# Patient Record
Sex: FEMALE | Race: BLACK OR AFRICAN AMERICAN | NOT HISPANIC OR LATINO
[De-identification: names, ages, dates, MRNs, and addresses within clinical notes are randomized per-mention and may not be internally consistent; named-entity substitution may affect disease eponyms.]

---

## 2017-10-18 ENCOUNTER — TRANSCRIPTION ENCOUNTER (OUTPATIENT)
Age: 27
End: 2017-10-18

## 2018-08-21 PROBLEM — Z00.00 ENCOUNTER FOR PREVENTIVE HEALTH EXAMINATION: Status: ACTIVE | Noted: 2018-08-21

## 2018-08-27 ENCOUNTER — APPOINTMENT (OUTPATIENT)
Dept: MATERNAL FETAL MEDICINE | Facility: CLINIC | Age: 28
End: 2018-08-27
Payer: MEDICAID

## 2018-08-27 ENCOUNTER — ASOB RESULT (OUTPATIENT)
Age: 28
End: 2018-08-27

## 2018-08-27 PROCEDURE — 99204 OFFICE O/P NEW MOD 45 MIN: CPT | Mod: 25

## 2018-09-14 ENCOUNTER — APPOINTMENT (OUTPATIENT)
Dept: MATERNAL FETAL MEDICINE | Facility: CLINIC | Age: 28
End: 2018-09-14

## 2018-10-11 ENCOUNTER — APPOINTMENT (OUTPATIENT)
Dept: MATERNAL FETAL MEDICINE | Facility: CLINIC | Age: 28
End: 2018-10-11
Payer: MEDICAID

## 2018-10-11 ENCOUNTER — ASOB RESULT (OUTPATIENT)
Age: 28
End: 2018-10-11

## 2018-10-11 PROCEDURE — 99213 OFFICE O/P EST LOW 20 MIN: CPT

## 2020-04-25 ENCOUNTER — MESSAGE (OUTPATIENT)
Age: 30
End: 2020-04-25

## 2020-05-18 LAB
SARS-COV-2 IGG SERPL IA-ACNC: 0.1 INDEX
SARS-COV-2 IGG SERPL QL IA: NEGATIVE

## 2021-03-30 ENCOUNTER — TRANSCRIPTION ENCOUNTER (OUTPATIENT)
Age: 31
End: 2021-03-30

## 2021-04-29 LAB
COVID-19 NUCLEOCAPSID  GAM ANTIBODY INTERPRETATION: POSITIVE
SARS-COV-2 AB SERPL QL IA: 28.1 INDEX

## 2021-07-02 ENCOUNTER — APPOINTMENT (OUTPATIENT)
Dept: OBGYN | Facility: CLINIC | Age: 31
End: 2021-07-02
Payer: COMMERCIAL

## 2021-07-02 VITALS — SYSTOLIC BLOOD PRESSURE: 117 MMHG | DIASTOLIC BLOOD PRESSURE: 72 MMHG | HEIGHT: 63 IN

## 2021-07-02 DIAGNOSIS — Z78.9 OTHER SPECIFIED HEALTH STATUS: ICD-10-CM

## 2021-07-02 DIAGNOSIS — N60.02 SOLITARY CYST OF RIGHT BREAST: ICD-10-CM

## 2021-07-02 DIAGNOSIS — N64.4 MASTODYNIA: ICD-10-CM

## 2021-07-02 DIAGNOSIS — Z80.3 FAMILY HISTORY OF MALIGNANT NEOPLASM OF BREAST: ICD-10-CM

## 2021-07-02 DIAGNOSIS — N60.01 SOLITARY CYST OF RIGHT BREAST: ICD-10-CM

## 2021-07-02 PROCEDURE — 99072 ADDL SUPL MATRL&STAF TM PHE: CPT

## 2021-07-02 PROCEDURE — 99385 PREV VISIT NEW AGE 18-39: CPT

## 2021-07-02 NOTE — PHYSICAL EXAM
[Appropriately responsive] : appropriately responsive [Alert] : alert [No Acute Distress] : no acute distress [No Lymphadenopathy] : no lymphadenopathy [Regular Rate Rhythm] : regular rate rhythm [No Murmurs] : no murmurs [Clear to Auscultation B/L] : clear to auscultation bilaterally [Soft] : soft [Non-tender] : non-tender [Oriented x3] : oriented x3 [FreeTextEntry3] : no thyromegaly [FreeTextEntry7] : no organomegaly [Examination Of The Breasts] : a normal appearance [] : multiple nodules were palpated [Labia Majora] : normal [Labia Minora] : normal [Normal] : normal [FreeTextEntry4] : nl vagina [FreeTextEntry5] : non tender PAP done [FreeTextEntry6] : no uterine or adnexal masses [FreeTextEntry8] : nl bimanual

## 2021-07-02 NOTE — DISCUSSION/SUMMARY
[FreeTextEntry1] : 31 yr old got  in June and pictures were beautiful. Patient has multiple cysts bilaterally and she has pain in breast. Scanned her past sonograms and has several cysts. Referral for sonogram . Gave her [facility] Rob[facility]ol[facility] referral for all her bilateral cysts .She said she had a large fibroadenoma removed.. She sometimes has discomfort with sex and told her sometimes when banging cervix can cause discomfort and especially when close to getting period. No pain on entry and lubricates, Complete exam done. They are using condoms and plan to try for baby in about a year. She states Braca test was neg

## 2021-07-02 NOTE — HISTORY OF PRESENT ILLNESS
[Men] : men [FreeTextEntry1] : 31 yr old new patient has breast pain [FreeTextEntry2] :    5 past partners

## 2021-07-07 ENCOUNTER — NON-APPOINTMENT (OUTPATIENT)
Age: 31
End: 2021-07-07

## 2021-07-07 ENCOUNTER — APPOINTMENT (OUTPATIENT)
Dept: CARDIOLOGY | Facility: CLINIC | Age: 31
End: 2021-07-07
Payer: COMMERCIAL

## 2021-07-07 VITALS
HEIGHT: 63 IN | BODY MASS INDEX: 26.4 KG/M2 | DIASTOLIC BLOOD PRESSURE: 70 MMHG | HEART RATE: 54 BPM | SYSTOLIC BLOOD PRESSURE: 102 MMHG | OXYGEN SATURATION: 100 % | WEIGHT: 149 LBS

## 2021-07-07 DIAGNOSIS — T78.40XD ALLERGY, UNSPECIFIED, SUBSEQUENT ENCOUNTER: ICD-10-CM

## 2021-07-07 DIAGNOSIS — M54.5 LOW BACK PAIN: ICD-10-CM

## 2021-07-07 PROCEDURE — 93000 ELECTROCARDIOGRAM COMPLETE: CPT

## 2021-07-07 PROCEDURE — 99204 OFFICE O/P NEW MOD 45 MIN: CPT

## 2021-07-07 PROCEDURE — 99072 ADDL SUPL MATRL&STAF TM PHE: CPT

## 2021-07-07 NOTE — REVIEW OF SYSTEMS
[Chest Discomfort] : chest discomfort [Palpitations] : palpitations [Negative] : Heme/Lymph [SOB] : no shortness of breath

## 2021-07-07 NOTE — REASON FOR VISIT
[FreeTextEntry1] : This is the first office visit for this 31-year-old female who experienced chest pains a few weeks ago.  She was under stress at the time.  She was planning her own wedding and found out that a cousin who is of similar age had  of a myocardial infarction.  She went to the [facility] emergency room where they did serial enzymes and EKGs and sent her home after approximately 6 hours.  She now comes for follow-up.\par \par The patient had COVID-19 in 2021.  At the time she lost her sense of taste and smell.  Of note recently she has been having a sensation of smelling smoke when there is no smoke.\par \par The patient has had known fibrocystic disease of the breast and had breast biopsy.\par \par The patient never smoked.  She only has an occasional alcohol averaging 2 or 3 a month.  She has no recreational drugs.  She only has occasional caffeine.\par \par Both parents are alive and well.\par \par She takes an occasional ibuprofen for pain and loratadine for skin allergies.\par \par She has no known drug allergies.\par \par She denies any shortness of breath but does get occasional palpitations.

## 2021-07-07 NOTE — DISCUSSION/SUMMARY
[FreeTextEntry1] : The patient was examined.  Her blood pressure was 102/70, pulse was 54.  Her lungs were clear to auscultation.  Cardiac exam was negative for murmurs rubs or gallops.  Her EKG showed sinus bradycardia with normal QRS complexes.  Because of the atypical chest pain we will send the patient for a treadmill stress test.  Because of the palpitations and atypical chest pains we will send her for an echocardiogram to rule out mitral valve prolapse and for LV size and function.  No new medications were prescribed at this visit.  If the patient still gets abnormal smells in the future she will follow-up with a neurologist.  She will return here in approximately 1 month after these tests are done.  Total time spent on the day of the encounter was 49  minutes which includes  face-to-face and non face-to-face times personally spent by the physician preparing to see the patient, obtaining  separately obtained history, performing a medically appropriate exam and evaluation, counseling, educating, talking to the family or caregivers, ordering medicines, ordering tests or procedures, referring and communicating with other healthcare foods professionals, and documenting clinical information in the electronic health record.

## 2021-07-08 ENCOUNTER — APPOINTMENT (OUTPATIENT)
Dept: CARDIOLOGY | Facility: CLINIC | Age: 31
End: 2021-07-08
Payer: COMMERCIAL

## 2021-07-08 PROCEDURE — 93015 CV STRESS TEST SUPVJ I&R: CPT

## 2021-07-08 PROCEDURE — 99072 ADDL SUPL MATRL&STAF TM PHE: CPT

## 2021-07-12 LAB — CYTOLOGY CVX/VAG DOC THIN PREP: ABNORMAL

## 2021-07-27 ENCOUNTER — APPOINTMENT (OUTPATIENT)
Dept: CARDIOLOGY | Facility: CLINIC | Age: 31
End: 2021-07-27
Payer: COMMERCIAL

## 2021-07-27 PROCEDURE — 99072 ADDL SUPL MATRL&STAF TM PHE: CPT

## 2021-07-27 PROCEDURE — 93306 TTE W/DOPPLER COMPLETE: CPT

## 2021-08-02 ENCOUNTER — APPOINTMENT (OUTPATIENT)
Dept: CARDIOLOGY | Facility: CLINIC | Age: 31
End: 2021-08-02

## 2021-08-16 ENCOUNTER — RESULT REVIEW (OUTPATIENT)
Age: 31
End: 2021-08-16

## 2022-09-14 ENCOUNTER — EMERGENCY (EMERGENCY)
Facility: HOSPITAL | Age: 32
LOS: 1 days | Discharge: STILL ADMITTED OR ADMINISTRATIVE | End: 2022-09-14
Admitting: EMERGENCY MEDICINE

## 2022-09-14 ENCOUNTER — INPATIENT (INPATIENT)
Facility: HOSPITAL | Age: 32
LOS: 0 days | Discharge: HOME | End: 2022-09-15
Attending: SPECIALIST | Admitting: SPECIALIST

## 2022-09-14 VITALS
SYSTOLIC BLOOD PRESSURE: 135 MMHG | TEMPERATURE: 98 F | HEART RATE: 61 BPM | RESPIRATION RATE: 17 BRPM | HEIGHT: 63 IN | OXYGEN SATURATION: 100 % | DIASTOLIC BLOOD PRESSURE: 65 MMHG

## 2022-09-14 VITALS
SYSTOLIC BLOOD PRESSURE: 112 MMHG | HEART RATE: 60 BPM | DIASTOLIC BLOOD PRESSURE: 60 MMHG | TEMPERATURE: 99 F | RESPIRATION RATE: 16 BRPM

## 2022-09-14 DIAGNOSIS — Z3A.00 WEEKS OF GESTATION OF PREGNANCY NOT SPECIFIED: ICD-10-CM

## 2022-09-14 DIAGNOSIS — O26.899 OTHER SPECIFIED PREGNANCY RELATED CONDITIONS, UNSPECIFIED TRIMESTER: ICD-10-CM

## 2022-09-14 DIAGNOSIS — O44.40 LOW LYING PLACENTA NOS OR WITHOUT HEMORRHAGE, UNSPECIFIED TRIMESTER: ICD-10-CM

## 2022-09-14 DIAGNOSIS — N84.1 POLYP OF CERVIX UTERI: ICD-10-CM

## 2022-09-14 LAB
APPEARANCE UR: CLEAR — SIGNIFICANT CHANGE UP
APTT BLD: 26.1 SEC — LOW (ref 27–36.3)
BASOPHILS # BLD AUTO: 0.02 K/UL — SIGNIFICANT CHANGE UP (ref 0–0.2)
BASOPHILS NFR BLD AUTO: 0.3 % — SIGNIFICANT CHANGE UP (ref 0–2)
BILIRUB UR-MCNC: NEGATIVE — SIGNIFICANT CHANGE UP
BLD GP AB SCN SERPL QL: NEGATIVE — SIGNIFICANT CHANGE UP
COLOR SPEC: SIGNIFICANT CHANGE UP
DIFF PNL FLD: NEGATIVE — SIGNIFICANT CHANGE UP
EOSINOPHIL # BLD AUTO: 0.04 K/UL — SIGNIFICANT CHANGE UP (ref 0–0.5)
EOSINOPHIL NFR BLD AUTO: 0.6 % — SIGNIFICANT CHANGE UP (ref 0–6)
FIBRINOGEN PPP-MCNC: 853 MG/DL — HIGH (ref 330–520)
GLUCOSE UR QL: NEGATIVE — SIGNIFICANT CHANGE UP
HCT VFR BLD CALC: 35.8 % — SIGNIFICANT CHANGE UP (ref 34.5–45)
HGB BLD-MCNC: 11.9 G/DL — SIGNIFICANT CHANGE UP (ref 11.5–15.5)
HIV 1+2 AB+HIV1 P24 AG SERPL QL IA: SIGNIFICANT CHANGE UP
IANC: 4.73 K/UL — SIGNIFICANT CHANGE UP (ref 1.8–7.4)
IMM GRANULOCYTES NFR BLD AUTO: 0.7 % — SIGNIFICANT CHANGE UP (ref 0–1.5)
INR BLD: 1.02 RATIO — SIGNIFICANT CHANGE UP (ref 0.88–1.16)
KETONES UR-MCNC: NEGATIVE — SIGNIFICANT CHANGE UP
LEUKOCYTE ESTERASE UR-ACNC: NEGATIVE — SIGNIFICANT CHANGE UP
LYMPHOCYTES # BLD AUTO: 1.8 K/UL — SIGNIFICANT CHANGE UP (ref 1–3.3)
LYMPHOCYTES # BLD AUTO: 25.3 % — SIGNIFICANT CHANGE UP (ref 13–44)
MCHC RBC-ENTMCNC: 28.5 PG — SIGNIFICANT CHANGE UP (ref 27–34)
MCHC RBC-ENTMCNC: 33.2 GM/DL — SIGNIFICANT CHANGE UP (ref 32–36)
MCV RBC AUTO: 85.6 FL — SIGNIFICANT CHANGE UP (ref 80–100)
MONOCYTES # BLD AUTO: 0.47 K/UL — SIGNIFICANT CHANGE UP (ref 0–0.9)
MONOCYTES NFR BLD AUTO: 6.6 % — SIGNIFICANT CHANGE UP (ref 2–14)
NEUTROPHILS # BLD AUTO: 4.73 K/UL — SIGNIFICANT CHANGE UP (ref 1.8–7.4)
NEUTROPHILS NFR BLD AUTO: 66.5 % — SIGNIFICANT CHANGE UP (ref 43–77)
NITRITE UR-MCNC: NEGATIVE — SIGNIFICANT CHANGE UP
NRBC # BLD: 0 /100 WBCS — SIGNIFICANT CHANGE UP (ref 0–0)
NRBC # FLD: 0 K/UL — SIGNIFICANT CHANGE UP (ref 0–0)
PH UR: 6.5 — SIGNIFICANT CHANGE UP (ref 5–8)
PLATELET # BLD AUTO: 261 K/UL — SIGNIFICANT CHANGE UP (ref 150–400)
PROT UR-MCNC: NEGATIVE — SIGNIFICANT CHANGE UP
PROTHROM AB SERPL-ACNC: 11.8 SEC — SIGNIFICANT CHANGE UP (ref 10.5–13.4)
RBC # BLD: 4.18 M/UL — SIGNIFICANT CHANGE UP (ref 3.8–5.2)
RBC # FLD: 14.3 % — SIGNIFICANT CHANGE UP (ref 10.3–14.5)
RH IG SCN BLD-IMP: POSITIVE — SIGNIFICANT CHANGE UP
RH IG SCN BLD-IMP: POSITIVE — SIGNIFICANT CHANGE UP
SARS-COV-2 RNA SPEC QL NAA+PROBE: SIGNIFICANT CHANGE UP
SP GR SPEC: 1.01 — LOW (ref 1.01–1.05)
UROBILINOGEN FLD QL: SIGNIFICANT CHANGE UP
WBC # BLD: 7.11 K/UL — SIGNIFICANT CHANGE UP (ref 3.8–10.5)
WBC # FLD AUTO: 7.11 K/UL — SIGNIFICANT CHANGE UP (ref 3.8–10.5)

## 2022-09-14 PROCEDURE — 99221 1ST HOSP IP/OBS SF/LOW 40: CPT

## 2022-09-14 PROCEDURE — L9996: CPT

## 2022-09-14 RX ORDER — SODIUM CHLORIDE 9 MG/ML
1000 INJECTION, SOLUTION INTRAVENOUS ONCE
Refills: 0 | Status: COMPLETED | OUTPATIENT
Start: 2022-09-14 | End: 2022-09-14

## 2022-09-14 RX ORDER — ONDANSETRON 8 MG/1
4 TABLET, FILM COATED ORAL ONCE
Refills: 0 | Status: COMPLETED | OUTPATIENT
Start: 2022-09-14 | End: 2022-09-14

## 2022-09-14 RX ORDER — SODIUM CHLORIDE 9 MG/ML
3 INJECTION INTRAMUSCULAR; INTRAVENOUS; SUBCUTANEOUS EVERY 8 HOURS
Refills: 0 | Status: DISCONTINUED | OUTPATIENT
Start: 2022-09-14 | End: 2022-09-15

## 2022-09-14 RX ORDER — INFLUENZA VIRUS VACCINE 15; 15; 15; 15 UG/.5ML; UG/.5ML; UG/.5ML; UG/.5ML
0.5 SUSPENSION INTRAMUSCULAR ONCE
Refills: 0 | Status: DISCONTINUED | OUTPATIENT
Start: 2022-09-14 | End: 2022-09-15

## 2022-09-14 RX ADMIN — SODIUM CHLORIDE 2000 MILLILITER(S): 9 INJECTION, SOLUTION INTRAVENOUS at 16:10

## 2022-09-14 RX ADMIN — ONDANSETRON 4 MILLIGRAM(S): 8 TABLET, FILM COATED ORAL at 15:39

## 2022-09-14 RX ADMIN — SODIUM CHLORIDE 3 MILLILITER(S): 9 INJECTION INTRAMUSCULAR; INTRAVENOUS; SUBCUTANEOUS at 22:00

## 2022-09-14 NOTE — OB RN TRIAGE NOTE - FALL HARM RISK - UNIVERSAL INTERVENTIONS
Bed in lowest position, wheels locked, appropriate side rails in place/Call bell, personal items and telephone in reach/Instruct patient to call for assistance before getting out of bed or chair/Non-slip footwear when patient is out of bed/[place] to call system/Physically safe environment - no spills, clutter or unnecessary equipment/Purposeful Proactive Rounding/Room/bathroom lighting operational, light cord in reach

## 2022-09-14 NOTE — OB RN PATIENT PROFILE - FALL HARM RISK - UNIVERSAL INTERVENTIONS
Bed in lowest position, wheels locked, appropriate side rails in place/Call bell, personal items and telephone in reach/Instruct patient to call for assistance before getting out of bed or chair/Non-slip footwear when patient is out of bed/[place] to call system/Physically safe environment - no spills, clutter or unnecessary equipment/Purposeful Proactive Rounding/Room/bathroom lighting operational, light cord in reach

## 2022-09-14 NOTE — OB PROVIDER H&P - NS_ZIKATRAVEL_OBGYN_ALL_OB
Medication requested: HYDROXYZINE HCL 10 MG TABLET   Last refilled: 1/3/22  Qty: 60/1      Last seen: 1/3/22  RTC: 6 months  Cancel: 0  No-show: 0  Next appt: 8/22/22    Refill decision:   Refilled for 30 days per protocol.       No

## 2022-09-14 NOTE — PROGRESS NOTE ADULT - ASSESSMENT
A/P: 33yo  at 23w5d with placenta previa who presented to triage with nausea, vomiting, abdominal pain and vaginal spotting. TVUS with evidence of likely polyp at internal os which could explain why cervical os was 1cm dilated on digital exam. TVUS with long cervical length and patient appears comfortable overall. Given her continued abdominal pain and uterine irritability vs. contractions on the monitor, recommend observation overnight. Discussed with patient that if pain intensifies or bleeding increases to please alert team as soon as possible. At this time however, CL was unchanged and long over 5 hours and patient appears comfortable, therefore low suspicion for  labor. Therefore, do not recommend BMZ, mag or amp at this time, however would reassess if clinical picture changes. Given patient with persistent nausea and vomiting with decreased PO intake, would recommend hydration with IVF and treatment with zofran prn. Patient counseled regarding plan and all questions answered.   - Recommend inpatient observation overnight  - Please alert MFM with any change in clinical status including bleeding or increased pain to reassess if treatment with BMZ, mag or amp would be needed at that time   - 1L IVF bolus and zofran recommended     Carly Hirschberg, MFM Fellow   Patient seen and examined with MARYLU Martinez Attending

## 2022-09-14 NOTE — OB PROVIDER TRIAGE NOTE - NSHPPHYSICALEXAM_GEN_ALL_CORE
abdomen soft, nontender  taus: sliup, cephalic presentation, posterior placenta, +FM, MVP 5.9  sse: no blood noted in vaginal vault, cervix appears 1 cm dilated  sve: 1/30/-3/I  nst in progress

## 2022-09-14 NOTE — OB PROVIDER TRIAGE NOTE - NSOBPROVIDERNOTE_OBGYN_ALL_OB_FT
a/p: 32 y.o.  JAG 2023 @ 23.5 weeks hx vaginal bleeding    blood type    UA pending    Doron NP a/p: 32 y.o.  JAG 2023 @ 23.5 weeks hx vaginal bleeding    ua pending  pt declines tylenol for pain    Doron NP a/p: 32 y.o.  JAG 2023 @ 23.5 weeks hx vaginal bleeding    ua pending  pt declines tylenol for pain    1130a  Discussed with Dr Law. Plan for repeat VE 2 hours, cbc, coag panel. Plan of care reviewed with patient.    Doron, NP a/p: 32 y.o.  JAG 2023 @ 23.5 weeks hx vaginal bleeding    ua pending  pt declines tylenol for pain    1130a  Discussed with Dr Law. Plan for repeat VE 2 hours, cbc, coag panel. Plan of care reviewed with patient.    1220pm  repeat VE no change 3, scant blood tinged mucous    Doron, NP a/p: 32 y.o.  JAG 2023 @ 23.5 weeks hx vaginal bleeding    ua pending  pt declines tylenol for pain    1130a  Discussed with Dr Law. Plan for repeat VE 2 hours, cbc, coag panel. Plan of care reviewed with patient.    1220pm  repeat VE no change 3, scant blood tinged mucous    1245pm  cbc, coag panel WNL  Discussed with Dr Arias, PGY-4. Plan for MFM consult, possible admission for observation. Plan reviewed with patient.    Doron, NP a/p: 32 y.o.  JAG 2023 @ 23.5 weeks hx vaginal bleeding    ua pending  pt declines tylenol for pain    1130a  Discussed with Dr Law. Plan for repeat VE 2 hours, cbc, coag panel. Plan of care reviewed with patient.    1220pm  repeat VE no change 3, scant blood tinged mucous    1245pm  cbc, coag panel WNL  Discussed with Dr Arias, PGY-4. Plan for MFM consult, possible admission for observation. Plan reviewed with patient.    1419  Discussed with Dr Delgado, PGY-3. Plan for admission to antepartum for observation.    routine antepartum orders  covid 19 swab collected and pending  GBS collected and pending    Dr Rivera and Dr Vu, M fellow at bedside.     a/p: most likely cervical polyp with low lying placenta  plan remains as above  plan reviewed with patient     Doron, NP

## 2022-09-14 NOTE — OB PROVIDER TRIAGE NOTE - LABOR: CERVICAL EFFACEMENT
0-49% Drysol Counseling:  I discussed with the patient the risks of drysol/aluminum chloride including but not limited to skin rash, itching, irritation, burning.

## 2022-09-14 NOTE — ED ADULT NURSE NOTE - CHIEF COMPLAINT QUOTE
Pt 23 weeks pregnant c/o left sided pelvic pain starting 2 days ago. Pt states she was dx with placenta previa early in the pregnancy but has since resolved. +spotting.

## 2022-09-14 NOTE — ED ADULT TRIAGE NOTE - CHIEF COMPLAINT QUOTE
Pt 23 weeks pregnant c/o left sided pelvic pain starting 2 days ago. Pt states she was dx with placenta previa early in the pregnancy but has since reversed. +spotting. Pt 23 weeks pregnant c/o left sided pelvic pain starting 2 days ago. Pt states she was dx with placenta previa early in the pregnancy but has since resolved. +spotting.

## 2022-09-14 NOTE — OB RN TRIAGE NOTE - NS_TRIAGEADDITIONAL COMMENTS_OBGYN_ALL_OB_FT
5-3  174 5-3  174  Pt emailed records to J email and printed copy placed in patients chart.  Pt blood type is A positive with antibody screen negative collected on July 5, 2022 performed at [facility].

## 2022-09-14 NOTE — OB RN TRIAGE NOTE - NS_OBGYNHISTORY_OBGYN_ALL_OB_FT
Pt was evaluated at [facility] yesterday- transvanginal US  done and pt was told no dilation noted.
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
## 2022-09-14 NOTE — OB PROVIDER TRIAGE NOTE - NSHPLABSRESULTS_GEN_ALL_CORE
Urinalysis Basic - ( 14 Sep 2022 10:26 )    Color: Light Yellow / Appearance: Clear / S.008 / pH: x  Gluc: x / Ketone: Negative  / Bili: Negative / Urobili: <2 mg/dL   Blood: x / Protein: Negative / Nitrite: Negative   Leuk Esterase: Negative / RBC: x / WBC x   Sq Epi: x / Non Sq Epi: x / Bacteria: x

## 2022-09-14 NOTE — OB PROVIDER H&P - ASSESSMENT
a/p: 32 y.o.  JAG 2023 @ 23.5 weeks hx vaginal bleeding, low lying placenta, most likely cervical polyp    Discussed with Dr Delgado, PGY-3. Plan for admission to antepartum for observation.    routine antepartum orders  covid 19 swab collected and pending  GBS collected and pending    Dr Rivera and Dr Vu, MFM fellow at bedside.     a/p: no active bleeding at this time most likely cervical polyp with low lying placenta  plan remains as above  plan reviewed with patient     Doron, NP

## 2022-09-14 NOTE — OB RN TRIAGE NOTE - [facility] SX RECENT
Terbinafine Pregnancy And Lactation Text: This medication is Pregnancy Category B and is considered safe during pregnancy. It is also excreted in breast milk and breast feeding isn't recommended. Topical Sulfur Applications Pregnancy And Lactation Text: This medication is Pregnancy Category C and has an unknown safety profile during pregnancy. It is unknown if this topical medication is excreted in breast milk. Hydroxychloroquine Pregnancy And Lactation Text: This medication has been shown to cause fetal harm but it isn't assigned a Pregnancy Risk Category. There are small amounts excreted in breast milk. Elidel Pregnancy And Lactation Text: This medication is Pregnancy Category C. It is unknown if this medication is excreted in breast milk. Valtrex Counseling: I discussed with the patient the risks of valacyclovir including but not limited to kidney damage, nausea, vomiting and severe allergy.  The patient understands that if the infection seems to be worsening or is not improving, they are to call. Sski Pregnancy And Lactation Text: This medication is Pregnancy Category D and isn't considered safe during pregnancy. It is excreted in breast milk. Azithromycin Pregnancy And Lactation Text: This medication is considered safe during pregnancy and is also secreted in breast milk. Ivermectin Counseling:  Patient instructed to take medication on an empty stomach with a full glass of water.  Patient informed of potential adverse effects including but not limited to nausea, diarrhea, dizziness, itching, and swelling of the extremities or lymph nodes.  The patient verbalized understanding of the proper use and possible adverse effects of ivermectin.  All of the patient's questions and concerns were addressed. Clofazimine Pregnancy And Lactation Text: This medication is Pregnancy Category C and isn't considered safe during pregnancy. It is excreted in breast milk. Tetracycline Pregnancy And Lactation Text: This medication is Pregnancy Category D and not consider safe during pregnancy. It is also excreted in breast milk. Topical Clindamycin Pregnancy And Lactation Text: This medication is Pregnancy Category B and is considered safe during pregnancy. It is unknown if it is excreted in breast milk. Infliximab Pregnancy And Lactation Text: This medication is Pregnancy Category B and is considered safe during pregnancy. It is unknown if this medication is excreted in breast milk. Methotrexate Counseling:  Patient counseled regarding adverse effects of methotrexate including but not limited to nausea, vomiting, abnormalities in liver function tests. Patients may develop mouth sores, rash, diarrhea, and abnormalities in blood counts. The patient understands that monitoring is required including LFT's and blood counts.  There is a rare possibility of scarring of the liver and lung problems that can occur when taking methotrexate. Persistent nausea, loss of appetite, pale stools, dark urine, cough, and shortness of breath should be reported immediately. Patient advised to discontinue methotrexate treatment at least three months before attempting to become pregnant.  I discussed the need for folate supplements while taking methotrexate.  These supplements can decrease side effects during methotrexate treatment. The patient verbalized understanding of the proper use and possible adverse effects of methotrexate.  All of the patient's questions and concerns were addressed. Albendazole Pregnancy And Lactation Text: This medication is Pregnancy Category C and it isn't known if it is safe during pregnancy. It is also excreted in breast milk. Bexarotene Pregnancy And Lactation Text: This medication is Pregnancy Category X and should not be given to women who are pregnant or may become pregnant. This medication should not be used if you are breast feeding. Solaraze Pregnancy And Lactation Text: This medication is Pregnancy Category B and is considered safe. There is some data to suggest avoiding during the third trimester. It is unknown if this medication is excreted in breast milk. Bexarotene Counseling:  I discussed with the patient the risks of bexarotene including but not limited to hair loss, dry lips/skin/eyes, liver abnormalities, hyperlipidemia, pancreatitis, depression/suicidal ideation, photosensitivity, drug rash/allergic reactions, hypothyroidism, anemia, leukopenia, infection, cataracts, and teratogenicity.  Patient understands that they will need regular blood tests to check lipid profile, liver function tests, white blood cell count, thyroid function tests and pregnancy test if applicable. Siliq Counseling:  I discussed with the patient the risks of Siliq including but not limited to new or worsening depression, suicidal thoughts and behavior, immunosuppression, malignancy, posterior leukoencephalopathy syndrome, and serious infections.  The patient understands that monitoring is required including a PPD at baseline and must alert us or the primary physician if symptoms of infection or other concerning signs are noted. There is also a special program designed to monitor depression which is required with Siliq. Metronidazole Counseling:  I discussed with the patient the risks of metronidazole including but not limited to seizures, nausea/vomiting, a metallic taste in the mouth, nausea/vomiting and severe allergy. Bactrim Counseling:  I discussed with the patient the risks of sulfa antibiotics including but not limited to GI upset, allergic reaction, drug rash, diarrhea, dizziness, photosensitivity, and yeast infections.  Rarely, more serious reactions can occur including but not limited to aplastic anemia, agranulocytosis, methemoglobinemia, blood dyscrasias, liver or kidney failure, lung infiltrates or desquamative/blistering drug rashes. Picato Counseling:  I discussed with the patient the risks of Picato including but not limited to erythema, scaling, itching, weeping, crusting, and pain. Itraconazole Pregnancy And Lactation Text: This medication is Pregnancy Category C and it isn't know if it is safe during pregnancy. It is also excreted in breast milk. Erivedge Pregnancy And Lactation Text: This medication is Pregnancy Category X and is absolutely contraindicated during pregnancy. It is unknown if it is excreted in breast milk. Eucrisa Pregnancy And Lactation Text: This medication has not been assigned a Pregnancy Risk Category but animal studies failed to show danger with the topical medication. It is unknown if the medication is excreted in breast milk. Topical Sulfur Applications Counseling: Topical Sulfur Counseling: Patient counseled that this medication may cause skin irritation or allergic reactions.  In the event of skin irritation, the patient was advised to reduce the amount of the drug applied or use it less frequently.   The patient verbalized understanding of the proper use and possible adverse effects of topical sulfur application.  All of the patient's questions and concerns were addressed. Tetracycline Counseling: Patient counseled regarding possible photosensitivity and increased risk for sunburn.  Patient instructed to avoid sunlight, if possible.  When exposed to sunlight, patients should wear protective clothing, sunglasses, and sunscreen.  The patient was instructed to call the office immediately if the following severe adverse effects occur:  hearing changes, easy bruising/bleeding, severe headache, or vision changes.  The patient verbalized understanding of the proper use and possible adverse effects of tetracycline.  All of the patient's questions and concerns were addressed. Patient understands to avoid pregnancy while on therapy due to potential birth defects. Fluconazole Counseling:  Patient counseled regarding adverse effects of fluconazole including but not limited to headache, diarrhea, nausea, upset stomach, liver function test abnormalities, taste disturbance, and stomach pain.  There is a rare possibility of liver failure that can occur when taking fluconazole.  The patient understands that monitoring of LFTs and kidney function test may be required, especially at baseline. The patient verbalized understanding of the proper use and possible adverse effects of fluconazole.  All of the patient's questions and concerns were addressed. Carac Pregnancy And Lactation Text: This medication is Pregnancy Category X and contraindicated in pregnancy and in women who may become pregnant. It is unknown if this medication is excreted in breast milk. Doxepin Pregnancy And Lactation Text: This medication is Pregnancy Category C and it isn't known if it is safe during pregnancy. It is also excreted in breast milk and breast feeding isn't recommended. Nsaids Counseling: NSAID Counseling: I discussed with the patient that NSAIDs should be taken with food. Prolonged use of NSAIDs can result in the development of stomach ulcers.  Patient advised to stop taking NSAIDs if abdominal pain occurs.  The patient verbalized understanding of the proper use and possible adverse effects of NSAIDs.  All of the patient's questions and concerns were addressed. Rifampin Pregnancy And Lactation Text: This medication is Pregnancy Category C and it isn't know if it is safe during pregnancy. It is also excreted in breast milk and should not be used if you are breast feeding. Azathioprine Pregnancy And Lactation Text: This medication is Pregnancy Category D and isn't considered safe during pregnancy. It is unknown if this medication is excreted in breast milk. none Simponi Pregnancy And Lactation Text: The risk during pregnancy and breastfeeding is uncertain with this medication. Azathioprine Counseling:  I discussed with the patient the risks of azathioprine including but not limited to myelosuppression, immunosuppression, hepatotoxicity, lymphoma, and infections.  The patient understands that monitoring is required including baseline LFTs, Creatinine, possible TPMP genotyping and weekly CBCs for the first month and then every 2 weeks thereafter.  The patient verbalized understanding of the proper use and possible adverse effects of azathioprine.  All of the patient's questions and concerns were addressed. Stelara Counseling:  I discussed with the patient the risks of ustekinumab including but not limited to immunosuppression, malignancy, posterior leukoencephalopathy syndrome, and serious infections.  The patient understands that monitoring is required including a PPD at baseline and must alert us or the primary physician if symptoms of infection or other concerning signs are noted. Cyclosporine Counseling:  I discussed with the patient the risks of cyclosporine including but not limited to hypertension, gingival hyperplasia,myelosuppression, immunosuppression, liver damage, kidney damage, neurotoxicity, lymphoma, and serious infections. The patient understands that monitoring is required including baseline blood pressure, CBC, CMP, lipid panel and uric acid, and then 1-2 times monthly CMP and blood pressure. Erivedge Counseling- I discussed with the patient the risks of Erivedge including but not limited to nausea, vomiting, diarrhea, constipation, weight loss, changes in the sense of taste, decreased appetite, muscle spasms, and hair loss.  The patient verbalized understanding of the proper use and possible adverse effects of Erivedge.  All of the patient's questions and concerns were addressed. Hydroxyzine Pregnancy And Lactation Text: This medication is not safe during pregnancy and should not be taken. It is also excreted in breast milk and breast feeding isn't recommended. Arava Counseling:  Patient counseled regarding adverse effects of Arava including but not limited to nausea, vomiting, abnormalities in liver function tests. Patients may develop mouth sores, rash, diarrhea, and abnormalities in blood counts. The patient understands that monitoring is required including LFTs and blood counts.  There is a rare possibility of scarring of the liver and lung problems that can occur when taking methotrexate. Persistent nausea, loss of appetite, pale stools, dark urine, cough, and shortness of breath should be reported immediately. Patient advised to discontinue Arava treatment and consult with a physician prior to attempting conception. The patient will have to undergo a treatment to eliminate Arava from the body prior to conception. Isotretinoin Counseling: Patient should get monthly blood tests, not donate blood, not drive at night if vision affected, not share medication, and not undergo elective surgery for 6 months after tx completed. Side effects reviewed, pt to contact office should one occur. Cimetidine Counseling:  I discussed with the patient the risks of Cimetidine including but not limited to gynecomastia, headache, diarrhea, nausea, drowsiness, arrhythmias, pancreatitis, skin rashes, psychosis, bone marrow suppression and kidney toxicity. Ilumya Counseling: I discussed with the patient the risks of tildrakizumab including but not limited to immunosuppression, malignancy, posterior leukoencephalopathy syndrome, and serious infections.  The patient understands that monitoring is required including a PPD at baseline and must alert us or the primary physician if symptoms of infection or other concerning signs are noted. Ketoconazole Pregnancy And Lactation Text: This medication is Pregnancy Category C and it isn't know if it is safe during pregnancy. It is also excreted in breast milk and breast feeding isn't recommended. Dupixent Pregnancy And Lactation Text: This medication likely crosses the placenta but the risk for the fetus is uncertain. This medication is excreted in breast milk. Nsaids Pregnancy And Lactation Text: These medications are considered safe up to 30 weeks gestation. It is excreted in breast milk. Hydroxychloroquine Counseling:  I discussed with the patient that a baseline ophthalmologic exam is needed at the start of therapy and every year thereafter while on therapy. A CBC may also be warranted for monitoring.  The side effects of this medication were discussed with the patient, including but not limited to agranulocytosis, aplastic anemia, seizures, rashes, retinopathy, and liver toxicity. Patient instructed to call the office should any adverse effect occur.  The patient verbalized understanding of the proper use and possible adverse effects of Plaquenil.  All the patient's questions and concerns were addressed. Erythromycin Pregnancy And Lactation Text: This medication is Pregnancy Category B and is considered safe during pregnancy. It is also excreted in breast milk. Methotrexate Pregnancy And Lactation Text: This medication is Pregnancy Category X and is known to cause fetal harm. This medication is excreted in breast milk. Thalidomide Counseling: I discussed with the patient the risks of thalidomide including but not limited to birth defects, anxiety, weakness, chest pain, dizziness, cough and severe allergy. Griseofulvin Pregnancy And Lactation Text: This medication is Pregnancy Category X and is known to cause serious birth defects. It is unknown if this medication is excreted in breast milk but breast feeding should be avoided. Quinolones Counseling:  I discussed with the patient the risks of fluoroquinolones including but not limited to GI upset, allergic reaction, drug rash, diarrhea, dizziness, photosensitivity, yeast infections, liver function test abnormalities, tendonitis/tendon rupture. Cellcept Counseling:  I discussed with the patient the risks of mycophenolate mofetil including but not limited to infection/immunosuppression, GI upset, hypokalemia, hypercholesterolemia, bone marrow suppression, lymphoproliferative disorders, malignancy, GI ulceration/bleed/perforation, colitis, interstitial lung disease, kidney failure, progressive multifocal leukoencephalopathy, and birth defects.  The patient understands that monitoring is required including a baseline creatinine and regular CBC testing. In addition, patient must alert us immediately if symptoms of infection or other concerning signs are noted. High Dose Vitamin A Counseling: Side effects reviewed, pt to contact office should one occur. Humira Counseling:  I discussed with the patient the risks of adalimumab including but not limited to myelosuppression, immunosuppression, autoimmune hepatitis, demyelinating diseases, lymphoma, and serious infections.  The patient understands that monitoring is required including a PPD at baseline and must alert us or the primary physician if symptoms of infection or other concerning signs are noted. Spironolactone Counseling: Patient advised regarding risks of diarrhea, abdominal pain, hyperkalemia, birth defects (for female patients), liver toxicity and renal toxicity. The patient may need blood work to monitor liver and kidney function and potassium levels while on therapy. The patient verbalized understanding of the proper use and possible adverse effects of spironolactone.  All of the patient's questions and concerns were addressed. High Dose Vitamin A Pregnancy And Lactation Text: High dose vitamin A therapy is contraindicated during pregnancy and breast feeding. Topical Retinoid counseling:  Patient advised to apply a pea-sized amount only at bedtime and wait 30 minutes after washing their face before applying.  If too drying, patient may add a non-comedogenic moisturizer. The patient verbalized understanding of the proper use and possible adverse effects of retinoids.  All of the patient's questions and concerns were addressed. Albendazole Counseling:  I discussed with the patient the risks of albendazole including but not limited to cytopenia, kidney damage, nausea/vomiting and severe allergy.  The patient understands that this medication is being used in an off-label manner. Terbinafine Counseling: Patient counseling regarding adverse effects of terbinafine including but not limited to headache, diarrhea, rash, upset stomach, liver function test abnormalities, itching, taste/smell disturbance, nausea, abdominal pain, and flatulence.  There is a rare possibility of liver failure that can occur when taking terbinafine.  The patient understands that a baseline LFT and kidney function test may be required. The patient verbalized understanding of the proper use and possible adverse effects of terbinafine.  All of the patient's questions and concerns were addressed. Xolair Pregnancy And Lactation Text: This medication is Pregnancy Category B and is considered safe during pregnancy. This medication is excreted in breast milk. Sarecycline Counseling: Patient advised regarding possible photosensitivity and discoloration of the teeth, skin, lips, tongue and gums.  Patient instructed to avoid sunlight, if possible.  When exposed to sunlight, patients should wear protective clothing, sunglasses, and sunscreen.  The patient was instructed to call the office immediately if the following severe adverse effects occur:  hearing changes, easy bruising/bleeding, severe headache, or vision changes.  The patient verbalized understanding of the proper use and possible adverse effects of sarecycline.  All of the patient's questions and concerns were addressed. Clindamycin Pregnancy And Lactation Text: This medication can be used in pregnancy if certain situations. Clindamycin is also present in breast milk. Xolair Counseling:  Patient informed of potential adverse effects including but not limited to fever, muscle aches, rash and allergic reactions.  The patient verbalized understanding of the proper use and possible adverse effects of Xolair.  All of the patient's questions and concerns were addressed. Elidel Counseling: Patient may experience a mild burning sensation during topical application. Elidel is not approved in children less than 2 years of age. There have been case reports of hematologic and skin malignancies in patients using topical calcineurin inhibitors although causality is questionable. Include Pregnancy/Lactation Warning?: No Protopic Counseling: Patient may experience a mild burning sensation during topical application. Protopic is not approved in children less than 2 years of age. There have been case reports of hematologic and skin malignancies in patients using topical calcineurin inhibitors although causality is questionable. Minoxidil Counseling: Minoxidil is a topical medication which can increase blood flow where it is applied. It is uncertain how this medication increases hair growth. Side effects are uncommon and include stinging and allergic reactions. Benzoyl Peroxide Pregnancy And Lactation Text: This medication is Pregnancy Category C. It is unknown if benzoyl peroxide is excreted in breast milk. Cephalexin Counseling: I counseled the patient regarding use of cephalexin as an antibiotic for prophylactic and/or therapeutic purposes. Cephalexin (commonly prescribed under brand name Keflex) is a cephalosporin antibiotic which is active against numerous classes of bacteria, including most skin bacteria. Side effects may include nausea, diarrhea, gastrointestinal upset, rash, hives, yeast infections, and in rare cases, hepatitis, kidney disease, seizures, fever, confusion, neurologic symptoms, and others. Patients with severe allergies to penicillin medications are cautioned that there is about a 10% incidence of cross-reactivity with cephalosporins. When possible, patients with penicillin allergies should use alternatives to cephalosporins for antibiotic therapy. Glycopyrrolate Counseling:  I discussed with the patient the risks of glycopyrrolate including but not limited to skin rash, drowsiness, dry mouth, difficulty urinating, and blurred vision. Prednisone Counseling:  I discussed with the patient the risks of prolonged use of prednisone including but not limited to weight gain, insomnia, osteoporosis, mood changes, diabetes, susceptibility to infection, glaucoma and high blood pressure.  In cases where prednisone use is prolonged, patients should be monitored with blood pressure checks, serum glucose levels and an eye exam.  Additionally, the patient may need to be placed on GI prophylaxis, PCP prophylaxis, and calcium and vitamin D supplementation and/or a bisphosphonate.  The patient verbalized understanding of the proper use and the possible adverse effects of prednisone.  All of the patient's questions and concerns were addressed. Cosentyx Counseling:  I discussed with the patient the risks of Cosentyx including but not limited to worsening of Crohn's disease, immunosuppression, allergic reactions and infections.  The patient understands that monitoring is required including a PPD at baseline and must alert us or the primary physician if symptoms of infection or other concerning signs are noted. Cyclophosphamide Counseling:  I discussed with the patient the risks of cyclophosphamide including but not limited to hair loss, hormonal abnormalities, decreased fertility, abdominal pain, diarrhea, nausea and vomiting, bone marrow suppression and infection. The patient understands that monitoring is required while taking this medication. Dapsone Counseling: I discussed with the patient the risks of dapsone including but not limited to hemolytic anemia, agranulocytosis, rashes, methemoglobinemia, kidney failure, peripheral neuropathy, headaches, GI upset, and liver toxicity.  Patients who start dapsone require monitoring including baseline LFTs and weekly CBCs for the first month, then every month thereafter.  The patient verbalized understanding of the proper use and possible adverse effects of dapsone.  All of the patient's questions and concerns were addressed. Taltz Counseling: I discussed with the patient the risks of ixekizumab including but not limited to immunosuppression, serious infections, worsening of inflammatory bowel disease and drug reactions.  The patient understands that monitoring is required including a PPD at baseline and must alert us or the primary physician if symptoms of infection or other concerning signs are noted. Otezla Counseling: The side effects of Otezla were discussed with the patient, including but not limited to worsening or new depression, weight loss, diarrhea, nausea, upper respiratory tract infection, and headache. Patient instructed to call the office should any adverse effect occur.  The patient verbalized understanding of the proper use and possible adverse effects of Otezla.  All the patient's questions and concerns were addressed. Zyclara Counseling:  I discussed with the patient the risks of imiquimod including but not limited to erythema, scaling, itching, weeping, crusting, and pain.  Patient understands that the inflammatory response to imiquimod is variable from person to person and was educated regarded proper titration schedule.  If flu-like symptoms develop, patient knows to discontinue the medication and contact us. Cimzia Pregnancy And Lactation Text: This medication crosses the placenta but can be considered safe in certain situations. Cimzia may be excreted in breast milk. Eucrisa Counseling: Patient may experience a mild burning sensation during topical application. Eucrisa is not approved in children less than 2 years of age. Birth Control Pills Counseling: Birth Control Pill Counseling: I discussed with the patient the potential side effects of OCPs including but not limited to increased risk of stroke, heart attack, thrombophlebitis, deep venous thrombosis, hepatic adenomas, breast changes, GI upset, headaches, and depression.  The patient verbalized understanding of the proper use and possible adverse effects of OCPs. All of the patient's questions and concerns were addressed. Erythromycin Counseling:  I discussed with the patient the risks of erythromycin including but not limited to GI upset, allergic reaction, drug rash, diarrhea, increase in liver enzymes, and yeast infections. Carac Counseling:  I discussed with the patient the risks of Carac including but not limited to erythema, scaling, itching, weeping, crusting, and pain. Xelvandanaz Pregnancy And Lactation Text: This medication is Pregnancy Category D and is not considered safe during pregnancy.  The risk during breast feeding is also uncertain. Rituxan Counseling:  I discussed with the patient the risks of Rituxan infusions. Side effects can include infusion reactions, severe drug rashes including mucocutaneous reactions, reactivation of latent hepatitis and other infections and rarely progressive multifocal leukoencephalopathy.  All of the patient's questions and concerns were addressed. Isotretinoin Pregnancy And Lactation Text: This medication is Pregnancy Category X and is considered extremely dangerous during pregnancy. It is unknown if it is excreted in breast milk. Infliximab Counseling:  I discussed with the patient the risks of infliximab including but not limited to myelosuppression, immunosuppression, autoimmune hepatitis, demyelinating diseases, lymphoma, and serious infections.  The patient understands that monitoring is required including a PPD at baseline and must alert us or the primary physician if symptoms of infection or other concerning signs are noted. Hydroquinone Counseling:  Patient advised that medication may result in skin irritation, lightening (hypopigmentation), dryness, and burning.  In the event of skin irritation, the patient was advised to reduce the amount of the drug applied or use it less frequently.  Rarely, spots that are treated with hydroquinone can become darker (pseudoochronosis).  Should this occur, patient instructed to stop medication and call the office. The patient verbalized understanding of the proper use and possible adverse effects of hydroquinone.  All of the patient's questions and concerns were addressed. Tremfya Counseling: I discussed with the patient the risks of guselkumab including but not limited to immunosuppression, serious infections, worsening of inflammatory bowel disease and drug reactions.  The patient understands that monitoring is required including a PPD at baseline and must alert us or the primary physician if symptoms of infection or other concerning signs are noted. Doxycycline Pregnancy And Lactation Text: This medication is Pregnancy Category D and not consider safe during pregnancy. It is also excreted in breast milk but is considered safe for shorter treatment courses. Protopic Pregnancy And Lactation Text: This medication is Pregnancy Category C. It is unknown if this medication is excreted in breast milk when applied topically. Dupixent Counseling: I discussed with the patient the risks of dupilumab including but not limited to eye infection and irritation, cold sores, injection site reactions, worsening of asthma, allergic reactions and increased risk of parasitic infection.  Live vaccines should be avoided while taking dupilumab. Dupilumab will also interact with certain medications such as warfarin and cyclosporine. The patient understands that monitoring is required and they must alert us or the primary physician if symptoms of infection or other concerning signs are noted. Ketoconazole Counseling:   Patient counseled regarding improving absorption with orange juice.  Adverse effects include but are not limited to breast enlargement, headache, diarrhea, nausea, upset stomach, liver function test abnormalities, taste disturbance, and stomach pain.  There is a rare possibility of liver failure that can occur when taking ketoconazole. The patient understands that monitoring of LFTs may be required, especially at baseline. The patient verbalized understanding of the proper use and possible adverse effects of ketoconazole.  All of the patient's questions and concerns were addressed. Azithromycin Counseling:  I discussed with the patient the risks of azithromycin including but not limited to GI upset, allergic reaction, drug rash, diarrhea, and yeast infections. Topical Clindamycin Counseling: Patient counseled that this medication may cause skin irritation or allergic reactions.  In the event of skin irritation, the patient was advised to reduce the amount of the drug applied or use it less frequently.   The patient verbalized understanding of the proper use and possible adverse effects of clindamycin.  All of the patient's questions and concerns were addressed. Cyclophosphamide Pregnancy And Lactation Text: This medication is Pregnancy Category D and it isn't considered safe during pregnancy. This medication is excreted in breast milk. Acitretin Pregnancy And Lactation Text: This medication is Pregnancy Category X and should not be given to women who are pregnant or may become pregnant in the future. This medication is excreted in breast milk. Benzoyl Peroxide Counseling: Patient counseled that medicine may cause skin irritation and bleach clothing.  In the event of skin irritation, the patient was advised to reduce the amount of the drug applied or use it less frequently.   The patient verbalized understanding of the proper use and possible adverse effects of benzoyl peroxide.  All of the patient's questions and concerns were addressed. SSKI Counseling:  I discussed with the patient the risks of SSKI including but not limited to thyroid abnormalities, metallic taste, GI upset, fever, headache, acne, arthralgias, paraesthesias, lymphadenopathy, easy bleeding, arrhythmias, and allergic reaction. Cimzia Counseling:  I discussed with the patient the risks of Cimzia including but not limited to immunosuppression, allergic reactions and infections.  The patient understands that monitoring is required including a PPD at baseline and must alert us or the primary physician if symptoms of infection or other concerning signs are noted. Gabapentin Counseling: I discussed with the patient the risks of gabapentin including but not limited to dizziness, somnolence, fatigue and ataxia. Hydroxyzine Counseling: Patient advised that the medication is sedating and not to drive a car after taking this medication.  Patient informed of potential adverse effects including but not limited to dry mouth, urinary retention, and blurry vision.  The patient verbalized understanding of the proper use and possible adverse effects of hydroxyzine.  All of the patient's questions and concerns were addressed. Odomzo Counseling- I discussed with the patient the risks of Odomzo including but not limited to nausea, vomiting, diarrhea, constipation, weight loss, changes in the sense of taste, decreased appetite, muscle spasms, and hair loss.  The patient verbalized understanding of the proper use and possible adverse effects of Odomzo.  All of the patient's questions and concerns were addressed. Drysol Pregnancy And Lactation Text: This medication is considered safe during pregnancy and breast feeding. Otezla Pregnancy And Lactation Text: This medication is Pregnancy Category C and it isn't known if it is safe during pregnancy. It is unknown if it is excreted in breast milk. Cyclosporine Pregnancy And Lactation Text: This medication is Pregnancy Category C and it isn't know if it is safe during pregnancy. This medication is excreted in breast milk. Dapsone Pregnancy And Lactation Text: This medication is Pregnancy Category C and is not considered safe during pregnancy or breast feeding. Clindamycin Counseling: I counseled the patient regarding use of clindamycin as an antibiotic for prophylactic and/or therapeutic purposes. Clindamycin is active against numerous classes of bacteria, including skin bacteria. Side effects may include nausea, diarrhea, gastrointestinal upset, rash, hives, yeast infections, and in rare cases, colitis. Imiquimod Counseling:  I discussed with the patient the risks of imiquimod including but not limited to erythema, scaling, itching, weeping, crusting, and pain.  Patient understands that the inflammatory response to imiquimod is variable from person to person and was educated regarded proper titration schedule.  If flu-like symptoms develop, patient knows to discontinue the medication and contact us. Enbrel Counseling:  I discussed with the patient the risks of etanercept including but not limited to myelosuppression, immunosuppression, autoimmune hepatitis, demyelinating diseases, lymphoma, and infections.  The patient understands that monitoring is required including a PPD at baseline and must alert us or the primary physician if symptoms of infection or other concerning signs are noted. Valtrex Pregnancy And Lactation Text: this medication is Pregnancy Category B and is considered safe during pregnancy. This medication is not directly found in breast milk but it's metabolite acyclovir is present. Glycopyrrolate Pregnancy And Lactation Text: This medication is Pregnancy Category B and is considered safe during pregnancy. It is unknown if it is excreted breast milk. Rifampin Counseling: I discussed with the patient the risks of rifampin including but not limited to liver damage, kidney damage, red-orange body fluids, nausea/vomiting and severe allergy. Acitretin Counseling:  I discussed with the patient the risks of acitretin including but not limited to hair loss, dry lips/skin/eyes, liver damage, hyperlipidemia, depression/suicidal ideation, photosensitivity.  Serious rare side effects can include but are not limited to pancreatitis, pseudotumor cerebri, bony changes, clot formation/stroke/heart attack.  Patient understands that alcohol is contraindicated since it can result in liver toxicity and significantly prolong the elimination of the drug by many years. Griseofulvin Counseling:  I discussed with the patient the risks of griseofulvin including but not limited to photosensitivity, cytopenia, liver damage, nausea/vomiting and severe allergy.  The patient understands that this medication is best absorbed when taken with a fatty meal (e.g., ice cream or french fries). Colchicine Counseling:  Patient counseled regarding adverse effects including but not limited to stomach upset (nausea, vomiting, stomach pain, or diarrhea).  Patient instructed to limit alcohol consumption while taking this medication.  Colchicine may reduce blood counts especially with prolonged use.  The patient understands that monitoring of kidney function and blood counts may be required, especially at baseline. The patient verbalized understanding of the proper use and possible adverse effects of colchicine.  All of the patient's questions and concerns were addressed. Tazorac Counseling:  Patient advised that medication is irritating and drying.  Patient may need to apply sparingly and wash off after an hour before eventually leaving it on overnight.  The patient verbalized understanding of the proper use and possible adverse effects of tazorac.  All of the patient's questions and concerns were addressed. Cephalexin Pregnancy And Lactation Text: This medication is Pregnancy Category B and considered safe during pregnancy.  It is also excreted in breast milk but can be used safely for shorter doses. Oxybutynin Counseling:  I discussed with the patient the risks of oxybutynin including but not limited to skin rash, drowsiness, dry mouth, difficulty urinating, and blurred vision. Solaraze Counseling:  I discussed with the patient the risks of Solaraze including but not limited to erythema, scaling, itching, weeping, crusting, and pain. Birth Control Pills Pregnancy And Lactation Text: This medication should be avoided if pregnant and for the first 30 days post-partum. Skyrizi Counseling: I discussed with the patient the risks of risankizumab-rzaa including but not limited to immunosuppression, and serious infections.  The patient understands that monitoring is required including a PPD at baseline and must alert us or the primary physician if symptoms of infection or other concerning signs are noted. Metronidazole Pregnancy And Lactation Text: This medication is Pregnancy Category B and considered safe during pregnancy.  It is also excreted in breast milk. Tazorac Pregnancy And Lactation Text: This medication is not safe during pregnancy. It is unknown if this medication is excreted in breast milk. Xeljanz Counseling: I discussed with the patient the risks of Xeljanz therapy including increased risk of infection, liver issues, headache, diarrhea, or cold symptoms. Live vaccines should be avoided. They were instructed to call if they have any problems. Simponi Counseling:  I discussed with the patient the risks of golimumab including but not limited to myelosuppression, immunosuppression, autoimmune hepatitis, demyelinating diseases, lymphoma, and serious infections.  The patient understands that monitoring is required including a PPD at baseline and must alert us or the primary physician if symptoms of infection or other concerning signs are noted. Spironolactone Pregnancy And Lactation Text: This medication can cause feminization of the male fetus and should be avoided during pregnancy. The active metabolite is also found in breast milk. Bactrim Pregnancy And Lactation Text: This medication is Pregnancy Category D and is known to cause fetal risk.  It is also excreted in breast milk. Itraconazole Counseling:  I discussed with the patient the risks of itraconazole including but not limited to liver damage, nausea/vomiting, neuropathy, and severe allergy.  The patient understands that this medication is best absorbed when taken with acidic beverages such as non-diet cola or ginger ale.  The patient understands that monitoring is required including baseline LFTs and repeat LFTs at intervals.  The patient understands that they are to contact us or the primary physician if concerning signs are noted. Drysol Counseling:  I discussed with the patient the risks of drysol/aluminum chloride including but not limited to skin rash, itching, irritation, burning. 5-Fu Counseling: 5-Fluorouracil Counseling:  I discussed with the patient the risks of 5-fluorouracil including but not limited to erythema, scaling, itching, weeping, crusting, and pain. Rituxan Pregnancy And Lactation Text: This medication is Pregnancy Category C and it isn't know if it is safe during pregnancy. It is unknown if this medication is excreted in breast milk but similar antibodies are known to be excreted. Doxycycline Counseling:  Patient counseled regarding possible photosensitivity and increased risk for sunburn.  Patient instructed to avoid sunlight, if possible.  When exposed to sunlight, patients should wear protective clothing, sunglasses, and sunscreen.  The patient was instructed to call the office immediately if the following severe adverse effects occur:  hearing changes, easy bruising/bleeding, severe headache, or vision changes.  The patient verbalized understanding of the proper use and possible adverse effects of doxycycline.  All of the patient's questions and concerns were addressed. Detail Level: Detailed Doxepin Counseling:  Patient advised that the medication is sedating and not to drive a car after taking this medication. Patient informed of potential adverse effects including but not limited to dry mouth, urinary retention, and blurry vision.  The patient verbalized understanding of the proper use and possible adverse effects of doxepin.  All of the patient's questions and concerns were addressed. Clofazimine Counseling:  I discussed with the patient the risks of clofazimine including but not limited to skin and eye pigmentation, liver damage, nausea/vomiting, gastrointestinal bleeding and allergy. Minocycline Counseling: Patient advised regarding possible photosensitivity and discoloration of the teeth, skin, lips, tongue and gums.  Patient instructed to avoid sunlight, if possible.  When exposed to sunlight, patients should wear protective clothing, sunglasses, and sunscreen.  The patient was instructed to call the office immediately if the following severe adverse effects occur:  hearing changes, easy bruising/bleeding, severe headache, or vision changes.  The patient verbalized understanding of the proper use and possible adverse effects of minocycline.  All of the patient's questions and concerns were addressed.

## 2022-09-14 NOTE — OB RN PATIENT PROFILE - FUNCTIONAL ASSESSMENT - BASIC MOBILITY 5.
[Use of Plain Language] : use of plain language [Adequate] : adequate [None] : none [] : I have reviewed management goals with caretaker and provided a copy of care plan 4 = No assist / stand by assistance
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
## 2022-09-14 NOTE — OB RN PATIENT PROFILE - AS SC BRADEN MOBILITY
Addended by: SCHOENMAN, ERIN N on: 9/12/2022 11:22 AM     Modules accepted: Daxa Gilbert    
(4) no limitation

## 2022-09-15 ENCOUNTER — TRANSCRIPTION ENCOUNTER (OUTPATIENT)
Age: 32
End: 2022-09-15

## 2022-09-15 VITALS — DIASTOLIC BLOOD PRESSURE: 65 MMHG | SYSTOLIC BLOOD PRESSURE: 113 MMHG | HEART RATE: 59 BPM

## 2022-09-15 DIAGNOSIS — O20.8 OTHER HEMORRHAGE IN EARLY PREGNANCY: ICD-10-CM

## 2022-09-15 LAB
COVID-19 SPIKE DOMAIN AB INTERP: POSITIVE
COVID-19 SPIKE DOMAIN ANTIBODY RESULT: 17.5 U/ML — HIGH
HBV SURFACE AG SERPL QL IA: SIGNIFICANT CHANGE UP
RUBV IGG SER-ACNC: 4 INDEX — SIGNIFICANT CHANGE UP
RUBV IGG SER-IMP: POSITIVE — SIGNIFICANT CHANGE UP
SARS-COV-2 IGG+IGM SERPL QL IA: 17.5 U/ML — HIGH
SARS-COV-2 IGG+IGM SERPL QL IA: POSITIVE
T PALLIDUM AB TITR SER: NEGATIVE — SIGNIFICANT CHANGE UP

## 2022-09-15 PROCEDURE — 99232 SBSQ HOSP IP/OBS MODERATE 35: CPT

## 2022-09-15 RX ORDER — FAMOTIDINE 10 MG/ML
1 INJECTION INTRAVENOUS
Qty: 0 | Refills: 0 | DISCHARGE
Start: 2022-09-15

## 2022-09-15 RX ORDER — SENNA PLUS 8.6 MG/1
1 TABLET ORAL DAILY
Refills: 0 | Status: DISCONTINUED | OUTPATIENT
Start: 2022-09-15 | End: 2022-09-15

## 2022-09-15 RX ORDER — ONDANSETRON 8 MG/1
1 TABLET, FILM COATED ORAL
Qty: 30 | Refills: 0
Start: 2022-09-15

## 2022-09-15 RX ORDER — FAMOTIDINE 10 MG/ML
20 INJECTION INTRAVENOUS DAILY
Refills: 0 | Status: DISCONTINUED | OUTPATIENT
Start: 2022-09-15 | End: 2022-09-15

## 2022-09-15 RX ORDER — POLYETHYLENE GLYCOL 3350 17 G/17G
17 POWDER, FOR SOLUTION ORAL ONCE
Refills: 0 | Status: COMPLETED | OUTPATIENT
Start: 2022-09-15 | End: 2022-09-15

## 2022-09-15 RX ADMIN — SODIUM CHLORIDE 3 MILLILITER(S): 9 INJECTION INTRAMUSCULAR; INTRAVENOUS; SUBCUTANEOUS at 14:43

## 2022-09-15 RX ADMIN — POLYETHYLENE GLYCOL 3350 17 GRAM(S): 17 POWDER, FOR SOLUTION ORAL at 10:03

## 2022-09-15 RX ADMIN — SODIUM CHLORIDE 3 MILLILITER(S): 9 INJECTION INTRAMUSCULAR; INTRAVENOUS; SUBCUTANEOUS at 06:21

## 2022-09-15 RX ADMIN — SENNA PLUS 1 TABLET(S): 8.6 TABLET ORAL at 10:03

## 2022-09-15 RX ADMIN — FAMOTIDINE 20 MILLIGRAM(S): 10 INJECTION INTRAVENOUS at 12:30

## 2022-09-15 NOTE — DISCHARGE NOTE ANTEPARTUM - HOSPITAL COURSE
31yo  admitted at 23w5d for vaginal bleeding and cramping. Patient is seen at [facility] and seen there for vaginal spotting yesterday () but was discharged and patient came to [facility] today (). In triage, SVE was /-3. On TVUS by MFM, CL was 3.7-3.9 and cervical polyp was noted at the level of the internal cervical os. Placenta was noted to be posterior but within 0.5cm of cervical os, consistent with placenta previa. Decision was made by MFM to admit the patient overnight to assess for additional bleeding and pad counts. No additional bleeding seen on day of discharge. Some mild cramping still noted but unchanged from admission. Patient discharged with instructions to follow up within one week. Patient is going to try to establish care at [facility]. Information was passed along so patient can make appointment. Patient instructed to return to hospital for heavy vaginal bleeding. Instructions for nothing in vagina, no intercourse due to placenta previa given.     If patient cannot establish prenatal care at [facility], patient will either return to [facility] clinic or [facility].

## 2022-09-15 NOTE — PROGRESS NOTE ADULT - ATTENDING COMMENTS
MFM ATTENDING ATTESTATION    33yo P0 at 23w6d admitted for vaginal spotting in setting of placenta previa.     Initially presented with n/v/cramping to outside hospital. She reported that their evaluation included a vaginal exam with speculum and transvaginal ultrasound and she began spotting afterwards so came here for evaluation after they discharged her. Upon presentation here the patient reported she was told her placenta previa was resolved. She had a digital cervical exam performed in addition to transvaginal ultrasound which was 1/30/-3. MFM team was called to bedside and performed transvaginal ultrasound. TVUS revealed she still has placenta previa, and evidence of cervical polyp in the cervical canal on sonogram. She was admitted for observation.     Since then she has not had any further bleeding or spotting. Cramping on occasion, mostly left sided, in a line along her uterus. Denies dysuria/hematuria/flank pain. She is afebrile, not tachycardic, and has no suprapubic or CVA tenderness.     Labs reviewed, WBC 7  UA not suggestive of UTI    Now that she is >24 hours without spotting, can dc home with STRICT precautions - any bright red spotting at all, to return to hospital asap.   Educated patient that she does still have placenta previa, and advised pelvic rest.   Wants to follow up with us - team to arrange.

## 2022-09-15 NOTE — PROGRESS NOTE ADULT - SUBJECTIVE AND OBJECTIVE BOX
MFM Consult Note     33yo  at 23w5d who presented to triage with nausea, vomiting, abdominal pain and vaginal spotting. Patient states that she has a history of placenta previa, however was told it is resolving. She went to an OSH for evaluation yesterday and a SSE and TVUS was performed. Around 5pm last night after discharge home she began noticing bright red blood when she wiped in the bathroom. She denies any clots. This bleeding continued until today and she continued having abdominal pain so she came in. She describes the abdominal pain as mostly left sided and coming and going. Of note, she also endorses increased nausea and vomiting that started around 16 weeks however the past 5 days have been significantly worse. She states it is difficult to keep water down so she does feel a bit dehydrated. Otherwise she states that this pregnancy has been uncomplicated and she had an anatomy scan that was normal.     She denies any additional pertinent PMH/PSH.     ICU Vital Signs Last 24 Hrs  T(C): 37.0 (14 Sep 2022 10:10), Max: 37 (14 Sep 2022 09:59)  T(F): 98.6 (14 Sep 2022 10:10), Max: 98.6 (14 Sep 2022 09:59)  HR: 63 (14 Sep 2022 12:20) (57 - 63)  BP: 127/68 (14 Sep 2022 12:20) (112/60 - 135/65)  BP(mean): --  ABP: --  ABP(mean): --  RR: 16 (14 Sep 2022 09:59) (16 - 17)  SpO2: 100% (14 Sep 2022 09:23) (100% - 100%)    Gen: NAD   Pulm: non labored breathing   CV: RRR   Abd: soft, gravid, nontender, no rebound, no guarding                        11.9   7.11  )-----------( 261      ( 14 Sep 2022 11:36 )             35.8   PT/INR - ( 14 Sep 2022 11:36 )   PT: 11.8 sec;   INR: 1.02 ratio         PTT - ( 14 Sep 2022 11:36 )  PTT:26.1 sec    TVUS: CL 3.7-3.9 with what appears to be a polyp noted at the internal cervical os, cervix does not appear dilated, placenta is posterior and within 0.5cm of the cervical os, therefore consistent with placenta previa   TAUS: 502gm (22%ile), cephalic with MVP 5.1 and BPP 8/8     SVE done by triage provider - 1 cm dilated, this was done prior to ultrasound confirming previa as patient believed it resolved on prior scan   EFM: baseline 145 mod nahed +accels -decels   Quintana: ctx q1-3 vs. uterine irritability
R3 Antepartum Note, HD#2    Interval events:    Patient seen and examined at bedside, no acute overnight events. No acute complaints. Patient states that she has had no further bleeding and that nausea is improved. Pt reports +FM, denies LOF, VB, ctx, HA, epigastric pain, blurred vision, CP, SOB, N/V, fevers, and chills.    Vital Signs Last 24 Hours  T(C): 36.7 (09-15-22 @ 05:16), Max: 37 (09-14-22 @ 09:59)  HR: 57 (09-15-22 @ 05:16) (52 - 69)  BP: 112/57 (09-15-22 @ 05:16) (107/67 - 135/65)  RR: 17 (09-15-22 @ 05:16) (14 - 18)  SpO2: 99% (09-15-22 @ 05:16) (99% - 100%)    CAPILLARY BLOOD GLUCOSE          Physical Exam:  General: NAD  Abdomen: Soft, non-tender, gravid  Ext: No pain or swelling    NST reactive overnight    Labs:             11.9   7.11  )-----------( 261      ( 09-14 @ 11:36 )             35.8           PT/INR - ( 09-14 @ 11:36 )   PT: 11.8 sec;   INR: 1.02 ratio    PTT - ( 09-14 @ 11:36 )  PTT:26.1 sec    Uric Acid: (09-14 @ 11:36)  --       Fibrinogen: (09-14 @ 11:36)  853      LDH: (09-14 @ 11:36)  --         MEDICATIONS  (STANDING):  influenza   Vaccine 0.5 milliLiter(s) IntraMuscular once  sodium chloride 0.9% lock flush 3 milliLiter(s) IV Push every 8 hours    MEDICATIONS  (PRN):

## 2022-09-15 NOTE — DISCHARGE NOTE ANTEPARTUM - PLAN OF CARE
Call your OBGYN w/ any vaginal bleeding, contractions, leakage of fluid.  Call your OBGYN w/ any decreased fetal movement.  Follow up with your OBGYN within 1 week.     Patient to try to establish care at [facility].

## 2022-09-15 NOTE — DISCHARGE NOTE ANTEPARTUM - NS MD DC FALL RISK RISK
For information on Fall & Injury Prevention, visit: [email or web address] OR  [email or web address] OR  https://www.cdc.gov/steadi/patient.html

## 2022-09-15 NOTE — DISCHARGE NOTE ANTEPARTUM - CARE PLAN
1 Principal Discharge DX:	Vaginal bleeding during pregnancy, antepartum  Assessment and plan of treatment:	Call your OBGYN w/ any vaginal bleeding, contractions, leakage of fluid.  Call your OBGYN w/ any decreased fetal movement.  Follow up with your OBGYN within 1 week.     Patient to try to establish care at [facility].

## 2022-09-15 NOTE — DISCHARGE NOTE ANTEPARTUM - CARE PROVIDER_API CALL
Tammy Marques)  Obstetrics and Gynecology  [address] [place] [address]  Phone: [phone]  Fax: [phone]  Follow Up Time:

## 2022-09-15 NOTE — DISCHARGE NOTE ANTEPARTUM - MEDICATION SUMMARY - MEDICATIONS TO TAKE
I will START or STAY ON the medications listed below when I get home from the hospital:    ondansetron 4 mg oral tablet  -- 1 tab(s) by mouth every 8 hours, As Needed -for nausea   -- Indication: For Nausea     famotidine 20 mg oral tablet  -- 1 tab(s) by mouth once a day  -- Indication: For GERD    Prenatal 1 oral capsule  -- Indication: For Pregnancy

## 2022-09-15 NOTE — DISCHARGE NOTE ANTEPARTUM - PATIENT PORTAL LINK FT
You can access the FollowMyHealth Patient Portal offered by [facility] by registering at the following website: [email or web address]. By joining [email or web address]’s FollowMyHealth portal, you will also be able to view your health information using other applications (apps) compatible with our system.

## 2022-09-15 NOTE — PROGRESS NOTE ADULT - ASSESSMENT
33yo  at 23w6d with placenta previa initially presenting to triage with vaginal spotting and N/V/abd pain. Cervix long on TVUS, also noted to have cervical polyp, likely etiology of vaginal bleeding. Patient noted to have irritability/intermittent contractions on toco but cervix unchanged on serial exams, kept for overnight obervations.     #Vaginal bleeding  - likely in setting of cervical polyp  - Placenta appears low lying on sono   - pad counts   - will consider BMZ/Mg if further bleeding/heavier bleeding     #FWB  - noted to have uterine irritability/intermittent contractions on toco  - low likelihood of  labor given no cervical change   - NST BID   - Holding BMZ and Mg at this time as low concern for  labor/imminent delivery   - NICU consult PRN     #N/V  - s/p 1L bolus  - Zofran PRN   - symptoms improving, regular diet as tolerated     #MWB  - Reg diet   - SCDs   - SLIV    Maria D Delgado MD PGY3 33yo  at 23w6d with placenta previa initially presenting to triage with vaginal spotting and N/V/abd pain. Cervix long on TVUS, also noted to have cervical polyp, likely etiology of vaginal bleeding. Patient noted to have irritability/intermittent contractions on toco but cervix unchanged on serial exams, kept for overnight obervations.     #Vaginal bleeding  - likely in setting of cervical polyp  - Placenta appears low lying on sono   - pad counts   - will consider BMZ/Mg if further bleeding/heavier bleeding     #FWB  - noted to have uterine irritability/intermittent contractions on toco  - low likelihood of  labor given no cervical change   - NST BID   - Holding BMZ and Mg at this time as low concern for  labor/imminent delivery   - NICU consult PRN     #N/V  - s/p 1L bolus  - Zofran PRN   - symptoms improving, regular diet as tolerated     #MWB  - Reg diet   - SCDs   - SLIV    Maria D Delgado MD PGY3    MFM Addendum   1yo  at 23w6d with placenta previa who presented to triage with nausea, vomiting, abdominal pain and vaginal spotting and was found to have a cervical polyp on ultrasound. Overnight patient with no additional spotting and abdominal pain most consistent with round ligament pain. Nausea improved with zofran and appears to be linked to heart burn and constipation, will treat with pepcid and stool softener. Patient counseled on outpatient management of heartburn, nausea and round ligament pain. As per her bleeding, return precautions were also reviewed with patient and importance of pelvic rest. Given no evidence of continued bleeding or  labor, plan for discharge this afternoon. Will add urine gonorrhea/chlamydia and trichamonas for completion of workup. Patient desires to establish care with [facility], will give her the correct information.     Carly Hirschberg, MFM Fellow   Patient seen and examined with MARYLU Cary Attending

## 2022-09-17 LAB
CULTURE RESULTS: SIGNIFICANT CHANGE UP
SPECIMEN SOURCE: SIGNIFICANT CHANGE UP

## 2022-09-22 ENCOUNTER — OUTPATIENT (OUTPATIENT)
Dept: INPATIENT UNIT | Facility: HOSPITAL | Age: 32
LOS: 1 days | Discharge: HOME | End: 2022-09-22

## 2022-09-22 VITALS
DIASTOLIC BLOOD PRESSURE: 69 MMHG | HEART RATE: 75 BPM | SYSTOLIC BLOOD PRESSURE: 120 MMHG | TEMPERATURE: 98 F | RESPIRATION RATE: 16 BRPM

## 2022-09-22 VITALS — DIASTOLIC BLOOD PRESSURE: 72 MMHG | SYSTOLIC BLOOD PRESSURE: 116 MMHG | HEART RATE: 63 BPM

## 2022-09-22 DIAGNOSIS — Z98.890 OTHER SPECIFIED POSTPROCEDURAL STATES: Chronic | ICD-10-CM

## 2022-09-22 DIAGNOSIS — Z3A.00 WEEKS OF GESTATION OF PREGNANCY NOT SPECIFIED: ICD-10-CM

## 2022-09-22 DIAGNOSIS — O26.899 OTHER SPECIFIED PREGNANCY RELATED CONDITIONS, UNSPECIFIED TRIMESTER: ICD-10-CM

## 2022-09-22 LAB
APPEARANCE UR: CLEAR — SIGNIFICANT CHANGE UP
B PERT DNA SPEC QL NAA+PROBE: SIGNIFICANT CHANGE UP
B PERT+PARAPERT DNA PNL SPEC NAA+PROBE: SIGNIFICANT CHANGE UP
BILIRUB UR-MCNC: NEGATIVE — SIGNIFICANT CHANGE UP
BORDETELLA PARAPERTUSSIS (RAPRVP): SIGNIFICANT CHANGE UP
C PNEUM DNA SPEC QL NAA+PROBE: SIGNIFICANT CHANGE UP
COLOR SPEC: SIGNIFICANT CHANGE UP
DIFF PNL FLD: NEGATIVE — SIGNIFICANT CHANGE UP
FLUAV SUBTYP SPEC NAA+PROBE: SIGNIFICANT CHANGE UP
FLUBV RNA SPEC QL NAA+PROBE: SIGNIFICANT CHANGE UP
GLUCOSE UR QL: NEGATIVE — SIGNIFICANT CHANGE UP
HADV DNA SPEC QL NAA+PROBE: SIGNIFICANT CHANGE UP
HCOV 229E RNA SPEC QL NAA+PROBE: SIGNIFICANT CHANGE UP
HCOV HKU1 RNA SPEC QL NAA+PROBE: SIGNIFICANT CHANGE UP
HCOV NL63 RNA SPEC QL NAA+PROBE: SIGNIFICANT CHANGE UP
HCOV OC43 RNA SPEC QL NAA+PROBE: SIGNIFICANT CHANGE UP
HMPV RNA SPEC QL NAA+PROBE: SIGNIFICANT CHANGE UP
HPIV1 RNA SPEC QL NAA+PROBE: SIGNIFICANT CHANGE UP
HPIV2 RNA SPEC QL NAA+PROBE: SIGNIFICANT CHANGE UP
HPIV3 RNA SPEC QL NAA+PROBE: SIGNIFICANT CHANGE UP
HPIV4 RNA SPEC QL NAA+PROBE: SIGNIFICANT CHANGE UP
KETONES UR-MCNC: NEGATIVE — SIGNIFICANT CHANGE UP
LEUKOCYTE ESTERASE UR-ACNC: NEGATIVE — SIGNIFICANT CHANGE UP
M PNEUMO DNA SPEC QL NAA+PROBE: SIGNIFICANT CHANGE UP
NITRITE UR-MCNC: NEGATIVE — SIGNIFICANT CHANGE UP
PH UR: 6 — SIGNIFICANT CHANGE UP (ref 5–8)
PROT UR-MCNC: ABNORMAL
RAPID RVP RESULT: DETECTED
RSV RNA SPEC QL NAA+PROBE: SIGNIFICANT CHANGE UP
RV+EV RNA SPEC QL NAA+PROBE: SIGNIFICANT CHANGE UP
SARS-COV-2 RNA SPEC QL NAA+PROBE: DETECTED
SP GR SPEC: 1.01 — SIGNIFICANT CHANGE UP (ref 1.01–1.05)
UROBILINOGEN FLD QL: SIGNIFICANT CHANGE UP

## 2022-09-22 PROCEDURE — 59025 FETAL NON-STRESS TEST: CPT | Mod: 26

## 2022-09-22 PROCEDURE — 76815 OB US LIMITED FETUS(S): CPT | Mod: 26

## 2022-09-22 NOTE — OB RN TRIAGE NOTE - NS PRO ABUSE SCREEN SUSPICION NEGLECT YN
I would just recommend over-the-counter medicines to treat the symptoms at this point is probably a viral illness.   She can use Mucinex DM for cough, rest and fluids
No fever, cough just starting, no production, has headache, congestion and sore throat
Patient experiencing sneezing , sore throrat, headache , asking if she can get order something at her [address]
Pt informed
no

## 2022-09-22 NOTE — OB RN TRIAGE NOTE - FALL HARM RISK - UNIVERSAL INTERVENTIONS
Bed in lowest position, wheels locked, appropriate side rails in place/Call bell, personal items and telephone in reach/Instruct patient to call for assistance before getting out of bed or chair/Non-slip footwear when patient is out of bed/[place] to call system/Physically safe environment - no spills, clutter or unnecessary equipment/Purposeful Proactive Rounding/Room/bathroom lighting operational, light cord in reach

## 2022-09-22 NOTE — OB RN TRIAGE NOTE - NS_OBGYNHISTORY_OBGYN_ALL_OB_FT
Pt was evaluated at [facility] yesterday- transvanginal US  done and pt was told no dilation noted.

## 2022-09-22 NOTE — OB PROVIDER TRIAGE NOTE - HISTORY OF PRESENT ILLNESS
PNC Provider:  Dr Rosa.  Transferred from [facility]. Hosp.    States that she retured to d&t because she had some pink vaginal bleeding on the bath tissue this morning at 0900.  Patient states that she has had a cough x 3 days and she feels that  it caused the bleed.  She also reports pelvic pressure.  States that she was told that she had placenta previa which was resolved.  EDC:  1/6/20223  Patient is a y/o G P at s gest who presents with c/o since  Reports   Denies LOF or contractions.  AP Course: placenta previa.  Admitted on 9/14 for vaginal bleeding.  Meds:  PNV  NKDA  PMHx - eczema              - h/o rt. breast mass  PSHx - rt breast fibroadenoma removed  - 2008  OBHx - present  GYN - cervical polyp   SH/Psych - denies   PNC Provider:   Transferred from [facility]  to  Dr Rosa.  She has an appointment but has not seen her as yet.   EDC:  1/6/20223.  Patient is a 31 y/o G 1 P 0000 at 24 6/7wks gest who presents with c/o   pink vaginal bleeding on the bath tissue this morning at 0900.      Patient states that she has had a cough x 3 days and she feels that  it caused the bleed.    She also reports pelvic pressure.  States that she was told that she had placenta previa which was resolved.  Denies LOF or contractions.  AP Course: placenta previa.  Admitted on 9/14 for vaginal bleeding.  Meds:  PNV  NKDA  PMHx - eczema              - h/o rt. breast mass  PSHx - rt breast fibroadenoma removed  - 2008  OBHx - present  GYN - hpv - 2010  SH/Psych - denies

## 2022-09-22 NOTE — OB PROVIDER TRIAGE NOTE - NSHPLABSRESULTS_GEN_ALL_CORE
RRVP & UA RRVP & UA                  Urinalysis Basic - ( 22 Sep 2022 12:57 )    Color: Light Yellow / Appearance: Clear / S.012 / pH: x  Gluc: x / Ketone: Negative  / Bili: Negative / Urobili: <2 mg/dL   Blood: x / Protein: Trace / Nitrite: Negative   Leuk Esterase: Negative / RBC: x / WBC x   Sq Epi: x / Non Sq Epi: x / Bacteria: x        Rapid flu Influenza A (RapRVP): [place] (22 @ 13:14) RRVP & UA    Urinalysis Basic - ( 22 Sep 2022 12:57 )    Color: Light Yellow / Appearance: Clear / S.012 / pH: x  Gluc: x / Ketone: Negative  / Bili: Negative / Urobili: <2 mg/dL   Blood: x / Protein: Trace / Nitrite: Negative   Leuk Esterase: Negative / RBC: x / WBC x   Sq Epi: x / Non Sq Epi: x / Bacteria: x      Rapid flu Influenza A (RapRVP): [place]inn (22 @ 13:14) RRVP & UA    Urinalysis Basic - ( 22 Sep 2022 12:57 )    Color: Light Yellow / Appearance: Clear / S.012 / pH: x  Gluc: x / Ketone: Negative  / Bili: Negative / Urobili: <2 mg/dL   Blood: x / Protein: Trace / Nitrite: Negative   Leuk Esterase: Negative / RBC: x / WBC x   Sq Epi: x / Non Sq Epi: x / Bacteria: x      Rapid flu Influenza A (RapRVP): NotDetec (22 @ 13:14)  Covid -19 positive

## 2022-09-22 NOTE — OB PROVIDER TRIAGE NOTE - NS_OBGYNHISTORY_OBGYN_ALL_OB_FT
Pt was evaluated at [facility] yesterday- transvanginal US  done and pt was told no dilation noted.

## 2022-09-22 NOTE — OB PROVIDER TRIAGE NOTE - NSOBPROVIDERNOTE_OBGYN_ALL_OB_FT
Discussed finding with    Plan: Discussed finding with   Plan: Discussed findings with Dr. Delgado who discussed with Dr. Monte.  Plan:  patient is cleared for discharge home with pelvic rest.  Encouraged to f/u with Dr Rosa on Wed. as scheduled.

## 2022-09-22 NOTE — OB PROVIDER TRIAGE NOTE - ADDITIONAL INSTRUCTIONS
Plan:  patient is cleared for discharge home with pelvic rest.  Encouraged to f/u with Dr Rosa on Wed. as scheduled.

## 2022-09-22 NOTE — OB RN TRIAGE NOTE - CURRENT PREGNANCY COMPLICATIONS, OB PROFILE
placenta previa resolved 3-4 weeks ago/Gestational Age less than 36 Weeks placenta previa resolved 3-4 weeks ago/Gestational Age less than 36 Weeks/Other

## 2022-09-22 NOTE — OB PROVIDER TRIAGE NOTE - NSHPPHYSICALEXAM_GEN_ALL_CORE
Vital Signs Last 24 Hrs  T(C): 36.8 (22 Sep 2022 12:28), Max: 36.8 (22 Sep 2022 11:45)  T(F): 98.24 (22 Sep 2022 12:28), Max: 98.24 (22 Sep 2022 12:28)  HR: 75 (22 Sep 2022 12:25) (75 - 75)  BP: 120/69 (22 Sep 2022 12:25) (120/69 - 120/69)  BP(mean): --  RR: 16 (22 Sep 2022 12:28) (16 - 16)  SpO2: --    Abdomen  gravid, soft and nontender  Neg cva/suprapubic tenderness  TAS - vtx/post plac/venice 14.87  SSE - Mucoid d/c with pinkish tinge.  No active process noted  SVE - 1/L/-3  TVUS - placenta previa noted Cervical lenth 4.43cm with no dynamic changes.    Orders:  ua & rrvp Vital Signs Last 24 Hrs  T(C): 36.8 (22 Sep 2022 12:28), Max: 36.8 (22 Sep 2022 11:45)  T(F): 98.24 (22 Sep 2022 12:28), Max: 98.24 (22 Sep 2022 12:28)  HR: 75 (22 Sep 2022 12:25) (75 - 75)  BP: 120/69 (22 Sep 2022 12:25) (120/69 - 120/69)  BP(mean): --  RR: 16 (22 Sep 2022 12:28) (16 - 16)  SpO2: --    Abdomen  gravid, soft and nontender  Neg cva/suprapubic tenderness  TAS - vtx/post plac/venice 14.87  SSE - Mucoid d/c with pinkish tinge.  No active process noted  SVE - 1/L/-3  TVUS - placenta previa noted Cervical length 4.43cm with no dynamic changes.    Orders:  ua & rrvp

## 2022-09-28 ENCOUNTER — APPOINTMENT (OUTPATIENT)
Dept: OBGYN | Facility: CLINIC | Age: 32
End: 2022-09-28

## 2022-09-28 VITALS
DIASTOLIC BLOOD PRESSURE: 79 MMHG | SYSTOLIC BLOOD PRESSURE: 128 MMHG | HEART RATE: 59 BPM | BODY MASS INDEX: 30.65 KG/M2 | HEIGHT: 63 IN | WEIGHT: 173 LBS

## 2022-09-28 DIAGNOSIS — Z82.49 FAMILY HISTORY OF ISCHEMIC HEART DISEASE AND OTHER DISEASES OF THE CIRCULATORY SYSTEM: ICD-10-CM

## 2022-09-28 DIAGNOSIS — M51.26 OTHER INTERVERTEBRAL DISC DISPLACEMENT, LUMBAR REGION: ICD-10-CM

## 2022-09-28 PROCEDURE — 99212 OFFICE O/P EST SF 10 MIN: CPT | Mod: TH

## 2022-09-28 RX ORDER — LORATADINE 10 MG/1
10 TABLET, FILM COATED ORAL
Qty: 30 | Refills: 0 | Status: COMPLETED | COMMUNITY
Start: 2021-07-07 | End: 2022-09-28

## 2022-09-28 RX ORDER — PRENATAL VIT 49/IRON FUM/FOLIC 6.75-0.2MG
TABLET ORAL
Refills: 0 | Status: ACTIVE | COMMUNITY

## 2022-09-28 RX ORDER — IBUPROFEN 200 MG/1
200 TABLET ORAL 4 TIMES DAILY
Qty: 100 | Refills: 0 | Status: COMPLETED | COMMUNITY
Start: 2021-07-07 | End: 2022-09-28

## 2022-10-02 LAB
ABO + RH PNL BLD: NORMAL
BACTERIA UR CULT: NORMAL
BASOPHILS # BLD AUTO: 0.02 K/UL
BASOPHILS NFR BLD AUTO: 0.3 %
BLD GP AB SCN SERPL QL: NORMAL
EOSINOPHIL # BLD AUTO: 0.11 K/UL
EOSINOPHIL NFR BLD AUTO: 1.6 %
GLUCOSE 1H P 50 G GLC PO SERPL-MCNC: 110 MG/DL
HBV SURFACE AG SER QL: NONREACTIVE
HCT VFR BLD CALC: 37.2 %
HCV AB SER QL: NONREACTIVE
HCV S/CO RATIO: 0.15 S/CO
HGB BLD-MCNC: 12.2 G/DL
IMM GRANULOCYTES NFR BLD AUTO: 0.6 %
LYMPHOCYTES # BLD AUTO: 2.06 K/UL
LYMPHOCYTES NFR BLD AUTO: 29.1 %
MAN DIFF?: NORMAL
MCHC RBC-ENTMCNC: 28.7 PG
MCHC RBC-ENTMCNC: 32.8 GM/DL
MCV RBC AUTO: 87.5 FL
MONOCYTES # BLD AUTO: 0.26 K/UL
MONOCYTES NFR BLD AUTO: 3.7 %
NEUTROPHILS # BLD AUTO: 4.6 K/UL
NEUTROPHILS NFR BLD AUTO: 64.7 %
PLATELET # BLD AUTO: 283 K/UL
RBC # BLD: 4.25 M/UL
RBC # FLD: 14.5 %
RUBV IGG FLD-ACNC: 4.6 INDEX
RUBV IGG SER-IMP: POSITIVE
T PALLIDUM AB SER QL IA: NEGATIVE
WBC # FLD AUTO: 7.09 K/UL

## 2022-10-24 ENCOUNTER — OUTPATIENT (OUTPATIENT)
Dept: INPATIENT UNIT | Facility: HOSPITAL | Age: 32
LOS: 1 days | Discharge: HOME | End: 2022-10-24

## 2022-10-24 ENCOUNTER — RESULT REVIEW (OUTPATIENT)
Age: 32
End: 2022-10-24

## 2022-10-24 VITALS
RESPIRATION RATE: 16 BRPM | SYSTOLIC BLOOD PRESSURE: 134 MMHG | TEMPERATURE: 99 F | DIASTOLIC BLOOD PRESSURE: 64 MMHG | HEART RATE: 69 BPM | OXYGEN SATURATION: 100 %

## 2022-10-24 VITALS — SYSTOLIC BLOOD PRESSURE: 153 MMHG | DIASTOLIC BLOOD PRESSURE: 89 MMHG | HEART RATE: 57 BPM

## 2022-10-24 DIAGNOSIS — O26.899 OTHER SPECIFIED PREGNANCY RELATED CONDITIONS, UNSPECIFIED TRIMESTER: ICD-10-CM

## 2022-10-24 DIAGNOSIS — Z3A.00 WEEKS OF GESTATION OF PREGNANCY NOT SPECIFIED: ICD-10-CM

## 2022-10-24 DIAGNOSIS — Z98.890 OTHER SPECIFIED POSTPROCEDURAL STATES: Chronic | ICD-10-CM

## 2022-10-24 LAB
ALBUMIN SERPL ELPH-MCNC: 3.4 G/DL — SIGNIFICANT CHANGE UP (ref 3.3–5)
ALP SERPL-CCNC: 92 U/L — SIGNIFICANT CHANGE UP (ref 40–120)
ALT FLD-CCNC: 17 U/L — SIGNIFICANT CHANGE UP (ref 4–33)
AMYLASE P1 CFR SERPL: 99 U/L — SIGNIFICANT CHANGE UP (ref 25–125)
ANION GAP SERPL CALC-SCNC: 11 MMOL/L — SIGNIFICANT CHANGE UP (ref 7–14)
APPEARANCE UR: CLEAR — SIGNIFICANT CHANGE UP
APTT BLD: 26.8 SEC — LOW (ref 27–36.3)
AST SERPL-CCNC: 23 U/L — SIGNIFICANT CHANGE UP (ref 4–32)
BASOPHILS # BLD AUTO: 0.03 K/UL — SIGNIFICANT CHANGE UP (ref 0–0.2)
BASOPHILS NFR BLD AUTO: 0.3 % — SIGNIFICANT CHANGE UP (ref 0–2)
BILIRUB SERPL-MCNC: <0.2 MG/DL — SIGNIFICANT CHANGE UP (ref 0.2–1.2)
BILIRUB UR-MCNC: NEGATIVE — SIGNIFICANT CHANGE UP
BUN SERPL-MCNC: 6 MG/DL — LOW (ref 7–23)
CALCIUM SERPL-MCNC: 9.2 MG/DL — SIGNIFICANT CHANGE UP (ref 8.4–10.5)
CHLORIDE SERPL-SCNC: 108 MMOL/L — HIGH (ref 98–107)
CO2 SERPL-SCNC: 19 MMOL/L — LOW (ref 22–31)
COLOR SPEC: SIGNIFICANT CHANGE UP
CREAT ?TM UR-MCNC: 43 MG/DL — SIGNIFICANT CHANGE UP
CREAT SERPL-MCNC: 0.69 MG/DL — SIGNIFICANT CHANGE UP (ref 0.5–1.3)
DIFF PNL FLD: NEGATIVE — SIGNIFICANT CHANGE UP
EGFR: 117 ML/MIN/1.73M2 — SIGNIFICANT CHANGE UP
EOSINOPHIL # BLD AUTO: 0.13 K/UL — SIGNIFICANT CHANGE UP (ref 0–0.5)
EOSINOPHIL NFR BLD AUTO: 1.5 % — SIGNIFICANT CHANGE UP (ref 0–6)
FIBRINOGEN PPP-MCNC: 793 MG/DL — HIGH (ref 330–520)
GLUCOSE SERPL-MCNC: 79 MG/DL — SIGNIFICANT CHANGE UP (ref 70–99)
GLUCOSE UR QL: NEGATIVE — SIGNIFICANT CHANGE UP
HCT VFR BLD CALC: 36.1 % — SIGNIFICANT CHANGE UP (ref 34.5–45)
HGB BLD-MCNC: 11.9 G/DL — SIGNIFICANT CHANGE UP (ref 11.5–15.5)
IANC: 5.46 K/UL — SIGNIFICANT CHANGE UP (ref 1.8–7.4)
IMM GRANULOCYTES NFR BLD AUTO: 0.3 % — SIGNIFICANT CHANGE UP (ref 0–0.9)
INR BLD: 0.98 RATIO — SIGNIFICANT CHANGE UP (ref 0.88–1.16)
KETONES UR-MCNC: NEGATIVE — SIGNIFICANT CHANGE UP
LEUKOCYTE ESTERASE UR-ACNC: NEGATIVE — SIGNIFICANT CHANGE UP
LIDOCAIN IGE QN: 28 U/L — SIGNIFICANT CHANGE UP (ref 7–60)
LYMPHOCYTES # BLD AUTO: 2.63 K/UL — SIGNIFICANT CHANGE UP (ref 1–3.3)
LYMPHOCYTES # BLD AUTO: 29.8 % — SIGNIFICANT CHANGE UP (ref 13–44)
MAGNESIUM SERPL-MCNC: 1.9 MG/DL — SIGNIFICANT CHANGE UP (ref 1.6–2.6)
MCHC RBC-ENTMCNC: 28.3 PG — SIGNIFICANT CHANGE UP (ref 27–34)
MCHC RBC-ENTMCNC: 33 GM/DL — SIGNIFICANT CHANGE UP (ref 32–36)
MCV RBC AUTO: 85.7 FL — SIGNIFICANT CHANGE UP (ref 80–100)
MONOCYTES # BLD AUTO: 0.54 K/UL — SIGNIFICANT CHANGE UP (ref 0–0.9)
MONOCYTES NFR BLD AUTO: 6.1 % — SIGNIFICANT CHANGE UP (ref 2–14)
NEUTROPHILS # BLD AUTO: 5.46 K/UL — SIGNIFICANT CHANGE UP (ref 1.8–7.4)
NEUTROPHILS NFR BLD AUTO: 62 % — SIGNIFICANT CHANGE UP (ref 43–77)
NITRITE UR-MCNC: NEGATIVE — SIGNIFICANT CHANGE UP
NRBC # BLD: 0 /100 WBCS — SIGNIFICANT CHANGE UP (ref 0–0)
NRBC # FLD: 0 K/UL — SIGNIFICANT CHANGE UP (ref 0–0)
PH UR: 6.5 — SIGNIFICANT CHANGE UP (ref 5–8)
PHOSPHATE SERPL-MCNC: 3.2 MG/DL — SIGNIFICANT CHANGE UP (ref 2.5–4.5)
PLATELET # BLD AUTO: 246 K/UL — SIGNIFICANT CHANGE UP (ref 150–400)
POTASSIUM SERPL-MCNC: 4 MMOL/L — SIGNIFICANT CHANGE UP (ref 3.5–5.3)
POTASSIUM SERPL-SCNC: 4 MMOL/L — SIGNIFICANT CHANGE UP (ref 3.5–5.3)
PROT ?TM UR-MCNC: 5 MG/DL — SIGNIFICANT CHANGE UP
PROT SERPL-MCNC: 6.9 G/DL — SIGNIFICANT CHANGE UP (ref 6–8.3)
PROT UR-MCNC: NEGATIVE — SIGNIFICANT CHANGE UP
PROT/CREAT UR-RTO: 0.1 RATIO — SIGNIFICANT CHANGE UP (ref 0–0.2)
PROTHROM AB SERPL-ACNC: 11.4 SEC — SIGNIFICANT CHANGE UP (ref 10.5–13.4)
RBC # BLD: 4.21 M/UL — SIGNIFICANT CHANGE UP (ref 3.8–5.2)
RBC # FLD: 14.6 % — HIGH (ref 10.3–14.5)
SODIUM SERPL-SCNC: 138 MMOL/L — SIGNIFICANT CHANGE UP (ref 135–145)
SP GR SPEC: 1.01 — SIGNIFICANT CHANGE UP (ref 1.01–1.05)
UROBILINOGEN FLD QL: SIGNIFICANT CHANGE UP
WBC # BLD: 8.82 K/UL — SIGNIFICANT CHANGE UP (ref 3.8–10.5)
WBC # FLD AUTO: 8.82 K/UL — SIGNIFICANT CHANGE UP (ref 3.8–10.5)

## 2022-10-24 PROCEDURE — 99213 OFFICE O/P EST LOW 20 MIN: CPT | Mod: 25

## 2022-10-24 PROCEDURE — 76700 US EXAM ABDOM COMPLETE: CPT | Mod: 26

## 2022-10-24 PROCEDURE — 76830 TRANSVAGINAL US NON-OB: CPT | Mod: 26

## 2022-10-24 PROCEDURE — 76818 FETAL BIOPHYS PROFILE W/NST: CPT | Mod: 26

## 2022-10-24 RX ORDER — SODIUM CHLORIDE 9 MG/ML
3 INJECTION INTRAMUSCULAR; INTRAVENOUS; SUBCUTANEOUS EVERY 8 HOURS
Refills: 0 | Status: DISCONTINUED | OUTPATIENT
Start: 2022-10-24 | End: 2022-11-08

## 2022-10-24 RX ORDER — ACETAMINOPHEN 500 MG
1000 TABLET ORAL ONCE
Refills: 0 | Status: DISCONTINUED | OUTPATIENT
Start: 2022-10-24 | End: 2022-10-24

## 2022-10-24 RX ORDER — SIMETHICONE 80 MG/1
80 TABLET, CHEWABLE ORAL ONCE
Refills: 0 | Status: COMPLETED | OUTPATIENT
Start: 2022-10-24 | End: 2022-10-24

## 2022-10-24 RX ORDER — SODIUM CHLORIDE 9 MG/ML
1000 INJECTION, SOLUTION INTRAVENOUS ONCE
Refills: 0 | Status: COMPLETED | OUTPATIENT
Start: 2022-10-24 | End: 2022-10-24

## 2022-10-24 RX ORDER — AMOXICILLIN 250 MG/5ML
0 SUSPENSION, RECONSTITUTED, ORAL (ML) ORAL
Qty: 0 | Refills: 0 | DISCHARGE

## 2022-10-24 RX ADMIN — SODIUM CHLORIDE 1000 MILLILITER(S): 9 INJECTION, SOLUTION INTRAVENOUS at 21:33

## 2022-10-24 RX ADMIN — SIMETHICONE 80 MILLIGRAM(S): 80 TABLET, CHEWABLE ORAL at 18:17

## 2022-10-24 NOTE — OB RN TRIAGE NOTE - NS_OBGYNHISTORY_OBGYN_ALL_OB_FT
Pt was evaluated at [facility] yesterday- transvanginal US  done and pt was told no dilation noted. Pt was evaluated at [facility] yesterday- transvanginal US  done and pt was told no dilation noted. 9/13/2022 as per patient  .9/22/22 polyp as per patient

## 2022-10-24 NOTE — OB PROVIDER TRIAGE NOTE - NSOBPROVIDERNOTE_OBGYN_ALL_OB_FT
a/p: 32 y.o.  JAG 2023 @ 29.3 weeks r/o cholelithiasis, PTL    cbc, cmp, amylase/lipase, ua pending  pt declines tylenol for pain  simethicone 80 mg PO x 1  RUQ sono pending    Doron, NP a/p: 32 y.o.  JAG 2023 @ 29.3 weeks r/o cholelithiasis, PTL    cbc, cmp, amylase/lipase, ua pending  pt declines tylenol for pain  simethicone 80 mg PO x 1  RUQ sono pending    JMidy, NP    Note addendum:  HELLP LAbs neg.   VE: long and close  pt still refuse pain medication at this time  2200 discuss with Dr. Jama  tracing reviewed with Dr. Jean  No S/S of Pre-term labor at this time  SONO Mild right hydronephrosis  maternity band as needed  blend diet avoid spicy and greasy food.   pt to keep self very well hydrated  blood pressure monitoring @ home  pt instructed to come back if symptoms worsening  stable for discharge  Discharge patient home.  pre-term Labor precautions and fetal movements count were reviewed; if not in labor, will follow up with OB next week, call for an appointment.  Prior notes, lab results (prenatal chart review, prenatal labs) and recommendations were reviewed;  All ordered tests results reviewed and interpreted.  Plan of care was reviewed with patient and family; patient states understanding of the above plan.  In total 35 minutes spent with established/new patient.
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
## 2022-10-24 NOTE — OB RN TRIAGE NOTE - FALL HARM RISK - UNIVERSAL INTERVENTIONS
Bed in lowest position, wheels locked, appropriate side rails in place/Call bell, personal items and telephone in reach/Instruct patient to call for assistance before getting out of bed or chair/Non-slip footwear when patient is out of bed/[place] to call system/Physically safe environment - no spills, clutter or unnecessary equipment/Purposeful Proactive Rounding/Room/bathroom lighting operational, light cord in reach

## 2022-10-24 NOTE — OB PROVIDER TRIAGE NOTE - NSHPLABSRESULTS_GEN_ALL_CORE
CBC Full  -  ( 24 Oct 2022 18:53 )  WBC Count : 8.82 K/uL  RBC Count : 4.21 M/uL  Hemoglobin : 11.9 g/dL  Hematocrit : 36.1 %  Platelet Count - Automated : 246 K/uL  Mean Cell Volume : 85.7 fL  Mean Cell Hemoglobin : 28.3 pg  Mean Cell Hemoglobin Concentration : 33.0 gm/dL  Auto Neutrophil # : 5.46 K/uL  Auto Lymphocyte # : 2.63 K/uL  Auto Monocyte # : 0.54 K/uL  Auto Eosinophil # : 0.13 K/uL  Auto Basophil # : 0.03 K/uL  Auto Neutrophil % : 62.0 %  Auto Lymphocyte % : 29.8 %  Auto Monocyte % : 6.1 %  Auto Eosinophil % : 1.5 %  Auto Basophil % : 0.3 %  10-    138  |  108<H>  |  6<L>  ----------------------------<  79  4.0   |  19<L>  |  0.69    Ca    9.2      24 Oct 2022 18:53  Phos  3.2     10-  Mg     1.90     10-    TPro  6.9  /  Alb  3.4  /  TBili  <0.2  /  DBili  x   /  AST  23  /  ALT  17  /  AlkPhos  92  10-    PT/INR - ( 24 Oct 2022 21:15 )   PT: 11.4 sec;   INR: 0.98 ratio         PTT - ( 24 Oct 2022 21:15 )  PTT:26.8 sec  Fibrinogen 793  Urinalysis Basic - ( 24 Oct 2022 18:00 )    Color: Light Yellow / Appearance: Clear / S.013 / pH: x  Gluc: x / Ketone: Negative  / Bili: Negative / Urobili: <2 mg/dL   Blood: x / Protein: Negative / Nitrite: Negative   Leuk Esterase: Negative / RBC: x / WBC x   Sq Epi: x / Non Sq Epi: x / Bacteria: x    PCR 0.1  Abdominal US- Mild right hydronephrosis

## 2022-10-24 NOTE — OB RN TRIAGE NOTE - NS_TRIAGEADDITIONAL COMMENTS_OBGYN_ALL_OB_FT
logistical huddle called for high triage patient volume logistical huddle called for high triage patient volume    Ht 5"3  Wt 175

## 2022-10-24 NOTE — OB PROVIDER TRIAGE NOTE - NSHPPHYSICALEXAM_GEN_ALL_CORE
abdomen soft, nontender  taus: sliup, cephalic presentation, posterior placenta, bpp 8/8, venice 18.45, efw 1197 grams  sse: moderate leukorrhea noted, cervix appears closed, FFN collected and held  tvus: cervical length 5.3, no dynamic changes/funneling noted  nst in progress

## 2022-10-27 ENCOUNTER — NON-APPOINTMENT (OUTPATIENT)
Age: 32
End: 2022-10-27

## 2022-10-27 ENCOUNTER — APPOINTMENT (OUTPATIENT)
Dept: OBGYN | Facility: CLINIC | Age: 32
End: 2022-10-27

## 2022-10-27 VITALS
WEIGHT: 183 LBS | BODY MASS INDEX: 32.43 KG/M2 | DIASTOLIC BLOOD PRESSURE: 89 MMHG | HEART RATE: 67 BPM | SYSTOLIC BLOOD PRESSURE: 139 MMHG | HEIGHT: 63 IN

## 2022-10-27 VITALS — SYSTOLIC BLOOD PRESSURE: 142 MMHG | HEART RATE: 69 BPM | DIASTOLIC BLOOD PRESSURE: 92 MMHG

## 2022-10-27 PROCEDURE — 99212 OFFICE O/P EST SF 10 MIN: CPT | Mod: TH

## 2022-11-10 ENCOUNTER — NON-APPOINTMENT (OUTPATIENT)
Age: 32
End: 2022-11-10

## 2022-11-10 ENCOUNTER — INPATIENT (INPATIENT)
Facility: HOSPITAL | Age: 32
LOS: 17 days | Discharge: HOME | End: 2022-11-28
Attending: OBSTETRICS & GYNECOLOGY | Admitting: OBSTETRICS & GYNECOLOGY

## 2022-11-10 ENCOUNTER — TRANSCRIPTION ENCOUNTER (OUTPATIENT)
Age: 32
End: 2022-11-10

## 2022-11-10 VITALS
DIASTOLIC BLOOD PRESSURE: 81 MMHG | SYSTOLIC BLOOD PRESSURE: 150 MMHG | TEMPERATURE: 98 F | HEART RATE: 64 BPM | RESPIRATION RATE: 18 BRPM

## 2022-11-10 DIAGNOSIS — Z3A.00 WEEKS OF GESTATION OF PREGNANCY NOT SPECIFIED: ICD-10-CM

## 2022-11-10 DIAGNOSIS — Z98.890 OTHER SPECIFIED POSTPROCEDURAL STATES: Chronic | ICD-10-CM

## 2022-11-10 DIAGNOSIS — O26.899 OTHER SPECIFIED PREGNANCY RELATED CONDITIONS, UNSPECIFIED TRIMESTER: ICD-10-CM

## 2022-11-10 LAB
ALBUMIN SERPL ELPH-MCNC: 3.6 G/DL — SIGNIFICANT CHANGE UP (ref 3.3–5)
ALP SERPL-CCNC: 110 U/L — SIGNIFICANT CHANGE UP (ref 40–120)
ALT FLD-CCNC: 23 U/L — SIGNIFICANT CHANGE UP (ref 4–33)
ANION GAP SERPL CALC-SCNC: 10 MMOL/L — SIGNIFICANT CHANGE UP (ref 7–14)
APPEARANCE UR: CLEAR — SIGNIFICANT CHANGE UP
APTT BLD: 27.4 SEC — SIGNIFICANT CHANGE UP (ref 27–36.3)
AST SERPL-CCNC: 32 U/L — SIGNIFICANT CHANGE UP (ref 4–32)
BASOPHILS # BLD AUTO: 0.03 K/UL — SIGNIFICANT CHANGE UP (ref 0–0.2)
BASOPHILS NFR BLD AUTO: 0.4 % — SIGNIFICANT CHANGE UP (ref 0–2)
BILIRUB SERPL-MCNC: <0.2 MG/DL — SIGNIFICANT CHANGE UP (ref 0.2–1.2)
BILIRUB UR-MCNC: NEGATIVE — SIGNIFICANT CHANGE UP
BLD GP AB SCN SERPL QL: POSITIVE — SIGNIFICANT CHANGE UP
BUN SERPL-MCNC: 6 MG/DL — LOW (ref 7–23)
CALCIUM SERPL-MCNC: 9.5 MG/DL — SIGNIFICANT CHANGE UP (ref 8.4–10.5)
CHLORIDE SERPL-SCNC: 104 MMOL/L — SIGNIFICANT CHANGE UP (ref 98–107)
CO2 SERPL-SCNC: 22 MMOL/L — SIGNIFICANT CHANGE UP (ref 22–31)
COLOR SPEC: SIGNIFICANT CHANGE UP
CREAT ?TM UR-MCNC: 63 MG/DL — SIGNIFICANT CHANGE UP
CREAT SERPL-MCNC: 0.75 MG/DL — SIGNIFICANT CHANGE UP (ref 0.5–1.3)
DIFF PNL FLD: NEGATIVE — SIGNIFICANT CHANGE UP
EGFR: 107 ML/MIN/1.73M2 — SIGNIFICANT CHANGE UP
EOSINOPHIL # BLD AUTO: 0.13 K/UL — SIGNIFICANT CHANGE UP (ref 0–0.5)
EOSINOPHIL NFR BLD AUTO: 1.8 % — SIGNIFICANT CHANGE UP (ref 0–6)
FIBRINOGEN PPP-MCNC: 532 MG/DL — HIGH (ref 200–465)
GLUCOSE SERPL-MCNC: 86 MG/DL — SIGNIFICANT CHANGE UP (ref 70–99)
GLUCOSE UR QL: NEGATIVE — SIGNIFICANT CHANGE UP
HCT VFR BLD CALC: 37.4 % — SIGNIFICANT CHANGE UP (ref 34.5–45)
HGB BLD-MCNC: 12.5 G/DL — SIGNIFICANT CHANGE UP (ref 11.5–15.5)
IANC: 4.29 K/UL — SIGNIFICANT CHANGE UP (ref 1.8–7.4)
IMM GRANULOCYTES NFR BLD AUTO: 0.4 % — SIGNIFICANT CHANGE UP (ref 0–0.9)
INR BLD: 0.93 RATIO — SIGNIFICANT CHANGE UP (ref 0.88–1.16)
KETONES UR-MCNC: NEGATIVE — SIGNIFICANT CHANGE UP
LDH SERPL L TO P-CCNC: 278 U/L — HIGH (ref 135–225)
LEUKOCYTE ESTERASE UR-ACNC: NEGATIVE — SIGNIFICANT CHANGE UP
LYMPHOCYTES # BLD AUTO: 2.35 K/UL — SIGNIFICANT CHANGE UP (ref 1–3.3)
LYMPHOCYTES # BLD AUTO: 32.1 % — SIGNIFICANT CHANGE UP (ref 13–44)
MCHC RBC-ENTMCNC: 28.2 PG — SIGNIFICANT CHANGE UP (ref 27–34)
MCHC RBC-ENTMCNC: 33.4 GM/DL — SIGNIFICANT CHANGE UP (ref 32–36)
MCV RBC AUTO: 84.2 FL — SIGNIFICANT CHANGE UP (ref 80–100)
MONOCYTES # BLD AUTO: 0.48 K/UL — SIGNIFICANT CHANGE UP (ref 0–0.9)
MONOCYTES NFR BLD AUTO: 6.6 % — SIGNIFICANT CHANGE UP (ref 2–14)
NEUTROPHILS # BLD AUTO: 4.29 K/UL — SIGNIFICANT CHANGE UP (ref 1.8–7.4)
NEUTROPHILS NFR BLD AUTO: 58.7 % — SIGNIFICANT CHANGE UP (ref 43–77)
NITRITE UR-MCNC: NEGATIVE — SIGNIFICANT CHANGE UP
NRBC # BLD: 0 /100 WBCS — SIGNIFICANT CHANGE UP (ref 0–0)
NRBC # FLD: 0 K/UL — SIGNIFICANT CHANGE UP (ref 0–0)
PH UR: 7 — SIGNIFICANT CHANGE UP (ref 5–8)
PLATELET # BLD AUTO: 223 K/UL — SIGNIFICANT CHANGE UP (ref 150–400)
POTASSIUM SERPL-MCNC: 4 MMOL/L — SIGNIFICANT CHANGE UP (ref 3.5–5.3)
POTASSIUM SERPL-SCNC: 4 MMOL/L — SIGNIFICANT CHANGE UP (ref 3.5–5.3)
PROT ?TM UR-MCNC: 17 MG/DL — SIGNIFICANT CHANGE UP
PROT SERPL-MCNC: 7.2 G/DL — SIGNIFICANT CHANGE UP (ref 6–8.3)
PROT UR-MCNC: ABNORMAL
PROT/CREAT UR-RTO: 0.3 RATIO — HIGH (ref 0–0.2)
PROTHROM AB SERPL-ACNC: 10.8 SEC — SIGNIFICANT CHANGE UP (ref 10.5–13.4)
RBC # BLD: 4.44 M/UL — SIGNIFICANT CHANGE UP (ref 3.8–5.2)
RBC # FLD: 14.6 % — HIGH (ref 10.3–14.5)
RH IG SCN BLD-IMP: POSITIVE — SIGNIFICANT CHANGE UP
SODIUM SERPL-SCNC: 136 MMOL/L — SIGNIFICANT CHANGE UP (ref 135–145)
SP GR SPEC: 1.01 — SIGNIFICANT CHANGE UP (ref 1.01–1.05)
URATE SERPL-MCNC: 5 MG/DL — SIGNIFICANT CHANGE UP (ref 2.5–7)
UROBILINOGEN FLD QL: SIGNIFICANT CHANGE UP
WBC # BLD: 7.31 K/UL — SIGNIFICANT CHANGE UP (ref 3.8–10.5)
WBC # FLD AUTO: 7.31 K/UL — SIGNIFICANT CHANGE UP (ref 3.8–10.5)

## 2022-11-10 RX ORDER — INFLUENZA VIRUS VACCINE 15; 15; 15; 15 UG/.5ML; UG/.5ML; UG/.5ML; UG/.5ML
0.5 SUSPENSION INTRAMUSCULAR ONCE
Refills: 0 | Status: COMPLETED | OUTPATIENT
Start: 2022-11-10 | End: 2022-11-10

## 2022-11-10 RX ORDER — HEPARIN SODIUM 5000 [USP'U]/ML
5000 INJECTION INTRAVENOUS; SUBCUTANEOUS EVERY 12 HOURS
Refills: 0 | Status: DISCONTINUED | OUTPATIENT
Start: 2022-11-10 | End: 2022-11-24

## 2022-11-10 RX ORDER — MAGNESIUM SULFATE 500 MG/ML
2 VIAL (ML) INJECTION
Qty: 40 | Refills: 0 | Status: DISCONTINUED | OUTPATIENT
Start: 2022-11-10 | End: 2022-11-10

## 2022-11-10 RX ORDER — MAGNESIUM SULFATE 500 MG/ML
4 VIAL (ML) INJECTION ONCE
Refills: 0 | Status: COMPLETED | OUTPATIENT
Start: 2022-11-10 | End: 2022-11-10

## 2022-11-10 RX ORDER — NIFEDIPINE 30 MG
30 TABLET, EXTENDED RELEASE 24 HR ORAL DAILY
Refills: 0 | Status: DISCONTINUED | OUTPATIENT
Start: 2022-11-10 | End: 2022-11-13

## 2022-11-10 RX ORDER — FERROUS SULFATE 325(65) MG
325 TABLET ORAL DAILY
Refills: 0 | Status: DISCONTINUED | OUTPATIENT
Start: 2022-11-10 | End: 2022-11-10

## 2022-11-10 RX ORDER — ACETAMINOPHEN 500 MG
1000 TABLET ORAL ONCE
Refills: 0 | Status: COMPLETED | OUTPATIENT
Start: 2022-11-10 | End: 2022-11-10

## 2022-11-10 RX ORDER — FOLIC ACID 0.8 MG
1 TABLET ORAL DAILY
Refills: 0 | Status: DISCONTINUED | OUTPATIENT
Start: 2022-11-10 | End: 2022-11-25

## 2022-11-10 RX ORDER — FOLIC ACID 0.8 MG
1 TABLET ORAL DAILY
Refills: 0 | Status: DISCONTINUED | OUTPATIENT
Start: 2022-11-10 | End: 2022-11-10

## 2022-11-10 RX ORDER — MAGNESIUM SULFATE 500 MG/ML
2 VIAL (ML) INJECTION
Qty: 40 | Refills: 0 | Status: DISCONTINUED | OUTPATIENT
Start: 2022-11-10 | End: 2022-11-11

## 2022-11-10 RX ORDER — FERROUS SULFATE 325(65) MG
325 TABLET ORAL DAILY
Refills: 0 | Status: DISCONTINUED | OUTPATIENT
Start: 2022-11-10 | End: 2022-11-25

## 2022-11-10 RX ORDER — MAGNESIUM SULFATE 500 MG/ML
4 VIAL (ML) INJECTION ONCE
Refills: 0 | Status: DISCONTINUED | OUTPATIENT
Start: 2022-11-10 | End: 2022-11-10

## 2022-11-10 RX ORDER — ACETAMINOPHEN 500 MG
1000 TABLET ORAL ONCE
Refills: 0 | Status: DISCONTINUED | OUTPATIENT
Start: 2022-11-10 | End: 2022-11-10

## 2022-11-10 RX ORDER — NIFEDIPINE 30 MG
10 TABLET, EXTENDED RELEASE 24 HR ORAL ONCE
Refills: 0 | Status: COMPLETED | OUTPATIENT
Start: 2022-11-10 | End: 2022-11-10

## 2022-11-10 RX ADMIN — Medication 400 MILLIGRAM(S): at 16:47

## 2022-11-10 RX ADMIN — Medication 10 MILLIGRAM(S): at 20:49

## 2022-11-10 RX ADMIN — Medication 12 MILLIGRAM(S): at 22:00

## 2022-11-10 RX ADMIN — Medication 30 MILLIGRAM(S): at 21:11

## 2022-11-10 RX ADMIN — Medication 300 GRAM(S): at 20:54

## 2022-11-10 RX ADMIN — Medication 50 GM/HR: at 21:31

## 2022-11-10 RX ADMIN — Medication 1000 MILLIGRAM(S): at 21:00

## 2022-11-10 NOTE — OB RN PATIENT PROFILE - ARE SIGNIFICANT INDICATORS COMPLETE.
Pt C/O sore throat and cough x 2-3 days. Pain with  Swallowing. Non productive cough. Emergency Department Nursing Plan of Care       The Nursing Plan of Care is developed from the Nursing assessment and Emergency Department Attending provider initial evaluation. The plan of care may be reviewed in the ED Provider note.     The Plan of Care was developed with the following considerations:   Patient / Family readiness to learn indicated by:verbalized understanding  Persons(s) to be included in education: patient  Barriers to Learning/Limitations:No    Signed     Ilia Chirinos RN    5/6/2017   7:53 AM No Yes

## 2022-11-10 NOTE — OB PROVIDER TRIAGE NOTE - HISTORY OF PRESENT ILLNESS
31yo  @31w6d presenting from home for elevated BPs at home. Patient felt a headache that started last night. It has been waxing and waning in intensity. This is afternoon, she had BP of 181/101 and 171/94. Denies blurry vision, epigastric pain, RUQ pain, n/v. Of note, patient has had mild range BP's for the past few weeks. Last week, Dr. Rosa collected a 24 hour urine which was WNL (240). Patient has noticed some increased swelling in her legs but is unsure about weight gain. Denies contractions, LOF, vaginal bleeding. Endorses good fetal movement.    GBS unknown    OBHx: Primigravida  GYNHx: H/o abnormal pap in pregnancy, will get f/up pap smear postpartum  MHx: Herniated discs (unsure of location)  SHx: R breast fribroadenoma removal ()  SocialHx: Denies  PsychHx: Denies    Allergies: Denies  Meds: PNV, Vit C

## 2022-11-10 NOTE — OB RN PATIENT PROFILE - NSICDXPASTSURGICALHX_GEN_ALL_CORE_FT
PAST SURGICAL HISTORY:  S/P excision of fibroadenoma of breast 2010    Vaginal Infection "biopsy for HPV 2010"

## 2022-11-10 NOTE — OB PROVIDER TRIAGE NOTE - NSOBPROVIDERNOTE_OBGYN_ALL_OB_FT
31yo  @31w6d presenting to r/o sPEC. Patient currently meets criteria for gHTN. Mild range BP's noted in triage, no severe BP's recorded in triage.     Plan:  -Tylenol for Headache  -HELLP labs  -IV access  -BP monitoring  -Dispo pending    Plan per Dr. LUKE CRESPO)    Shivani Cueto PA-C

## 2022-11-10 NOTE — OB PROVIDER TRIAGE NOTE - NSHPPHYSICALEXAM_GEN_ALL_CORE
ICU Vital Signs Last 24 Hrs  T(C): 36.9 (10 Nov 2022 14:48), Max: 36.9 (10 Nov 2022 14:48)  T(F): 98.4 (10 Nov 2022 14:48), Max: 98.4 (10 Nov 2022 14:48)  HR: 52 (10 Nov 2022 16:13) (52 - 64)  BP: 143/81 (10 Nov 2022 16:13) (139/78 - 150/81)  BP(mean): --  ABP: --  ABP(mean): --  RR: 18 (10 Nov 2022 14:48) (18 - 18)  SpO2: --    Physical Exam:  General: NAD, well appearing  Abdomen: gravid, NT, NR, NG  Extremities: No calf tenderness b/l    SVE: Deferred

## 2022-11-10 NOTE — OB RN PATIENT PROFILE - FALL HARM RISK - UNIVERSAL INTERVENTIONS
Bed in lowest position, wheels locked, appropriate side rails in place/Call bell, personal items and telephone in reach/Instruct patient to call for assistance before getting out of bed or chair/Non-slip footwear when patient is out of bed/[place] to call system/Physically safe environment - no spills, clutter or unnecessary equipment/Purposeful Proactive Rounding/Room/bathroom lighting operational, light cord in reach

## 2022-11-10 NOTE — CHART NOTE - NSCHARTNOTEFT_GEN_A_CORE
20:/98  20:/97  20:/87  20:/78  20:/102  21:/83    Patient had severe range blood pressures  Dr. Jama made aware  Magnesium started at 20:54  Procardia IR 10mg given at 20:49  Procardia XL 30mg added daily  Cat 1 tracing      Pablo MICHELLE

## 2022-11-10 NOTE — OB PROVIDER H&P - ASSESSMENT
31yo  @31w6d admitted for PEC w/o severe features. Patient had headache that resolved with Tylenol. P/C 0.3    Plan:  -Admit to Antepartum  -Admission labs  -Reg diet  -AM HELLP labs  -NST BID  -BP monitoring  -24 hour urine  -MFM consult   -AM ATU sono    Plan per Dr. Wiley Cueto, PA-C  31yo  @31w6d admitted for PEC w/o severe features. Patient had headache that resolved with Tylenol. P/C 0.3    Plan:  -Admit to Antepartum  -Admission labs  -Reg diet  -AM HELLP labs  -NST BID  -BP monitoring  -24 hour urine  -MFM consult   -AM ATU sono  -Covid swab deferred -> patient was covid+ on     Plan per Dr. Wiley Cueto, PA-C

## 2022-11-10 NOTE — OB PROVIDER H&P - NSHPPHYSICALEXAM_GEN_ALL_CORE
ICU Vital Signs Last 24 Hrs  T(C): 36.9 (10 Nov 2022 14:48), Max: 36.9 (10 Nov 2022 14:48)  T(F): 98.4 (10 Nov 2022 14:48), Max: 98.4 (10 Nov 2022 14:48)  HR: 56 (10 Nov 2022 18:22) (52 - 64)  BP: 134/79 (10 Nov 2022 18:22) (119/75 - 150/81)  BP(mean): --  ABP: --  ABP(mean): --  RR: 18 (10 Nov 2022 14:48) (18 - 18)  SpO2: --    Physical Exam:  General: NAD, well appearing  Abdomen: gravid, NT, NR, NG  Extremities: No calf tenderness b/l    Sono: Vertex, +FH

## 2022-11-10 NOTE — OB RN TRIAGE NOTE - FALL HARM RISK - UNIVERSAL INTERVENTIONS
Bed in lowest position, wheels locked, appropriate side rails in place/Call bell, personal items and telephone in reach/Instruct patient to call for assistance before getting out of bed or chair/Non-slip footwear when patient is out of bed/[place] to call system/Physically safe environment - no spills, clutter or unnecessary equipment/Purposeful Proactive Rounding/Room/bathroom lighting operational, light cord in reach

## 2022-11-11 ENCOUNTER — APPOINTMENT (OUTPATIENT)
Dept: ANTEPARTUM | Facility: CLINIC | Age: 32
End: 2022-11-11

## 2022-11-11 DIAGNOSIS — O16.9 UNSPECIFIED MATERNAL HYPERTENSION, UNSPECIFIED TRIMESTER: ICD-10-CM

## 2022-11-11 LAB
ALBUMIN SERPL ELPH-MCNC: 3.4 G/DL — SIGNIFICANT CHANGE UP (ref 3.3–5)
ALBUMIN SERPL ELPH-MCNC: 3.4 G/DL — SIGNIFICANT CHANGE UP (ref 3.3–5)
ALP SERPL-CCNC: 106 U/L — SIGNIFICANT CHANGE UP (ref 40–120)
ALP SERPL-CCNC: 116 U/L — SIGNIFICANT CHANGE UP (ref 40–120)
ALT FLD-CCNC: 21 U/L — SIGNIFICANT CHANGE UP (ref 4–33)
ALT FLD-CCNC: 22 U/L — SIGNIFICANT CHANGE UP (ref 4–33)
ANION GAP SERPL CALC-SCNC: 12 MMOL/L — SIGNIFICANT CHANGE UP (ref 7–14)
ANION GAP SERPL CALC-SCNC: 12 MMOL/L — SIGNIFICANT CHANGE UP (ref 7–14)
APTT BLD: 27 SEC — SIGNIFICANT CHANGE UP (ref 27–36.3)
APTT BLD: 29.5 SEC — SIGNIFICANT CHANGE UP (ref 27–36.3)
AST SERPL-CCNC: 29 U/L — SIGNIFICANT CHANGE UP (ref 4–32)
AST SERPL-CCNC: 30 U/L — SIGNIFICANT CHANGE UP (ref 4–32)
BASOPHILS # BLD AUTO: 0 K/UL — SIGNIFICANT CHANGE UP (ref 0–0.2)
BASOPHILS # BLD AUTO: 0.01 K/UL — SIGNIFICANT CHANGE UP (ref 0–0.2)
BASOPHILS # BLD AUTO: 0.01 K/UL — SIGNIFICANT CHANGE UP (ref 0–0.2)
BASOPHILS NFR BLD AUTO: 0 % — SIGNIFICANT CHANGE UP (ref 0–2)
BASOPHILS NFR BLD AUTO: 0.1 % — SIGNIFICANT CHANGE UP (ref 0–2)
BASOPHILS NFR BLD AUTO: 0.1 % — SIGNIFICANT CHANGE UP (ref 0–2)
BILIRUB SERPL-MCNC: <0.2 MG/DL — SIGNIFICANT CHANGE UP (ref 0.2–1.2)
BILIRUB SERPL-MCNC: <0.2 MG/DL — SIGNIFICANT CHANGE UP (ref 0.2–1.2)
BUN SERPL-MCNC: 5 MG/DL — LOW (ref 7–23)
BUN SERPL-MCNC: 5 MG/DL — LOW (ref 7–23)
CALCIUM SERPL-MCNC: 7.4 MG/DL — LOW (ref 8.4–10.5)
CALCIUM SERPL-MCNC: 8.9 MG/DL — SIGNIFICANT CHANGE UP (ref 8.4–10.5)
CHLORIDE SERPL-SCNC: 101 MMOL/L — SIGNIFICANT CHANGE UP (ref 98–107)
CHLORIDE SERPL-SCNC: 103 MMOL/L — SIGNIFICANT CHANGE UP (ref 98–107)
CO2 SERPL-SCNC: 18 MMOL/L — LOW (ref 22–31)
CO2 SERPL-SCNC: 18 MMOL/L — LOW (ref 22–31)
COVID-19 SPIKE DOMAIN AB INTERP: POSITIVE
COVID-19 SPIKE DOMAIN ANTIBODY RESULT: >250 U/ML — HIGH
CREAT SERPL-MCNC: 0.67 MG/DL — SIGNIFICANT CHANGE UP (ref 0.5–1.3)
CREAT SERPL-MCNC: 0.77 MG/DL — SIGNIFICANT CHANGE UP (ref 0.5–1.3)
EGFR: 104 ML/MIN/1.73M2 — SIGNIFICANT CHANGE UP
EGFR: 118 ML/MIN/1.73M2 — SIGNIFICANT CHANGE UP
EOSINOPHIL # BLD AUTO: 0 K/UL — SIGNIFICANT CHANGE UP (ref 0–0.5)
EOSINOPHIL # BLD AUTO: 0 K/UL — SIGNIFICANT CHANGE UP (ref 0–0.5)
EOSINOPHIL # BLD AUTO: 0.01 K/UL — SIGNIFICANT CHANGE UP (ref 0–0.5)
EOSINOPHIL NFR BLD AUTO: 0 % — SIGNIFICANT CHANGE UP (ref 0–6)
EOSINOPHIL NFR BLD AUTO: 0 % — SIGNIFICANT CHANGE UP (ref 0–6)
EOSINOPHIL NFR BLD AUTO: 0.1 % — SIGNIFICANT CHANGE UP (ref 0–6)
FIBRINOGEN PPP-MCNC: 525 MG/DL — HIGH (ref 200–465)
FIBRINOGEN PPP-MCNC: 636 MG/DL — HIGH (ref 200–465)
GLUCOSE SERPL-MCNC: 114 MG/DL — HIGH (ref 70–99)
GLUCOSE SERPL-MCNC: 119 MG/DL — HIGH (ref 70–99)
HCT VFR BLD CALC: 35.8 % — SIGNIFICANT CHANGE UP (ref 34.5–45)
HCT VFR BLD CALC: 37.5 % — SIGNIFICANT CHANGE UP (ref 34.5–45)
HCT VFR BLD CALC: 38.9 % — SIGNIFICANT CHANGE UP (ref 34.5–45)
HGB BLD-MCNC: 12 G/DL — SIGNIFICANT CHANGE UP (ref 11.5–15.5)
HGB BLD-MCNC: 12.5 G/DL — SIGNIFICANT CHANGE UP (ref 11.5–15.5)
HGB BLD-MCNC: 13 G/DL — SIGNIFICANT CHANGE UP (ref 11.5–15.5)
IANC: 6.39 K/UL — SIGNIFICANT CHANGE UP (ref 1.8–7.4)
IANC: 6.56 K/UL — SIGNIFICANT CHANGE UP (ref 1.8–7.4)
IANC: 9.32 K/UL — HIGH (ref 1.8–7.4)
IMM GRANULOCYTES NFR BLD AUTO: 0.4 % — SIGNIFICANT CHANGE UP (ref 0–0.9)
IMM GRANULOCYTES NFR BLD AUTO: 0.4 % — SIGNIFICANT CHANGE UP (ref 0–0.9)
IMM GRANULOCYTES NFR BLD AUTO: 0.6 % — SIGNIFICANT CHANGE UP (ref 0–0.9)
INR BLD: 0.91 RATIO — SIGNIFICANT CHANGE UP (ref 0.88–1.16)
INR BLD: 0.91 RATIO — SIGNIFICANT CHANGE UP (ref 0.88–1.16)
INR BLD: 0.96 RATIO — SIGNIFICANT CHANGE UP (ref 0.88–1.16)
LDH SERPL L TO P-CCNC: 261 U/L — HIGH (ref 135–225)
LDH SERPL L TO P-CCNC: 283 U/L — HIGH (ref 135–225)
LYMPHOCYTES # BLD AUTO: 1.01 K/UL — SIGNIFICANT CHANGE UP (ref 1–3.3)
LYMPHOCYTES # BLD AUTO: 1.11 K/UL — SIGNIFICANT CHANGE UP (ref 1–3.3)
LYMPHOCYTES # BLD AUTO: 1.49 K/UL — SIGNIFICANT CHANGE UP (ref 1–3.3)
LYMPHOCYTES # BLD AUTO: 13.1 % — SIGNIFICANT CHANGE UP (ref 13–44)
LYMPHOCYTES # BLD AUTO: 13.3 % — SIGNIFICANT CHANGE UP (ref 13–44)
LYMPHOCYTES # BLD AUTO: 14.2 % — SIGNIFICANT CHANGE UP (ref 13–44)
MAGNESIUM SERPL-MCNC: 4.8 MG/DL — HIGH (ref 1.6–2.6)
MAGNESIUM SERPL-MCNC: 5.8 MG/DL — HIGH (ref 1.6–2.6)
MAGNESIUM SERPL-MCNC: 6.2 MG/DL — HIGH (ref 1.6–2.6)
MCHC RBC-ENTMCNC: 27.8 PG — SIGNIFICANT CHANGE UP (ref 27–34)
MCHC RBC-ENTMCNC: 27.8 PG — SIGNIFICANT CHANGE UP (ref 27–34)
MCHC RBC-ENTMCNC: 28 PG — SIGNIFICANT CHANGE UP (ref 27–34)
MCHC RBC-ENTMCNC: 33.3 GM/DL — SIGNIFICANT CHANGE UP (ref 32–36)
MCHC RBC-ENTMCNC: 33.4 GM/DL — SIGNIFICANT CHANGE UP (ref 32–36)
MCHC RBC-ENTMCNC: 33.5 GM/DL — SIGNIFICANT CHANGE UP (ref 32–36)
MCV RBC AUTO: 82.9 FL — SIGNIFICANT CHANGE UP (ref 80–100)
MCV RBC AUTO: 83.3 FL — SIGNIFICANT CHANGE UP (ref 80–100)
MCV RBC AUTO: 83.8 FL — SIGNIFICANT CHANGE UP (ref 80–100)
MONOCYTES # BLD AUTO: 0.12 K/UL — SIGNIFICANT CHANGE UP (ref 0–0.9)
MONOCYTES # BLD AUTO: 0.14 K/UL — SIGNIFICANT CHANGE UP (ref 0–0.9)
MONOCYTES # BLD AUTO: 0.55 K/UL — SIGNIFICANT CHANGE UP (ref 0–0.9)
MONOCYTES NFR BLD AUTO: 1.5 % — LOW (ref 2–14)
MONOCYTES NFR BLD AUTO: 1.8 % — LOW (ref 2–14)
MONOCYTES NFR BLD AUTO: 4.8 % — SIGNIFICANT CHANGE UP (ref 2–14)
NEUTROPHILS # BLD AUTO: 6.39 K/UL — SIGNIFICANT CHANGE UP (ref 1.8–7.4)
NEUTROPHILS # BLD AUTO: 6.56 K/UL — SIGNIFICANT CHANGE UP (ref 1.8–7.4)
NEUTROPHILS # BLD AUTO: 9.32 K/UL — HIGH (ref 1.8–7.4)
NEUTROPHILS NFR BLD AUTO: 81.6 % — HIGH (ref 43–77)
NEUTROPHILS NFR BLD AUTO: 83.7 % — HIGH (ref 43–77)
NEUTROPHILS NFR BLD AUTO: 84.3 % — HIGH (ref 43–77)
NRBC # BLD: 0 /100 WBCS — SIGNIFICANT CHANGE UP (ref 0–0)
NRBC # FLD: 0 K/UL — SIGNIFICANT CHANGE UP (ref 0–0)
PLATELET # BLD AUTO: 232 K/UL — SIGNIFICANT CHANGE UP (ref 150–400)
PLATELET # BLD AUTO: 239 K/UL — SIGNIFICANT CHANGE UP (ref 150–400)
PLATELET # BLD AUTO: 253 K/UL — SIGNIFICANT CHANGE UP (ref 150–400)
POTASSIUM SERPL-MCNC: 4.1 MMOL/L — SIGNIFICANT CHANGE UP (ref 3.5–5.3)
POTASSIUM SERPL-MCNC: 4.2 MMOL/L — SIGNIFICANT CHANGE UP (ref 3.5–5.3)
POTASSIUM SERPL-SCNC: 4.1 MMOL/L — SIGNIFICANT CHANGE UP (ref 3.5–5.3)
POTASSIUM SERPL-SCNC: 4.2 MMOL/L — SIGNIFICANT CHANGE UP (ref 3.5–5.3)
PROT SERPL-MCNC: 7 G/DL — SIGNIFICANT CHANGE UP (ref 6–8.3)
PROT SERPL-MCNC: 7.3 G/DL — SIGNIFICANT CHANGE UP (ref 6–8.3)
PROTHROM AB SERPL-ACNC: 10.5 SEC — SIGNIFICANT CHANGE UP (ref 10.5–13.4)
PROTHROM AB SERPL-ACNC: 10.6 SEC — SIGNIFICANT CHANGE UP (ref 10.5–13.4)
PROTHROM AB SERPL-ACNC: 11.1 SEC — SIGNIFICANT CHANGE UP (ref 10.5–13.4)
RBC # BLD: 4.32 M/UL — SIGNIFICANT CHANGE UP (ref 3.8–5.2)
RBC # BLD: 4.5 M/UL — SIGNIFICANT CHANGE UP (ref 3.8–5.2)
RBC # BLD: 4.64 M/UL — SIGNIFICANT CHANGE UP (ref 3.8–5.2)
RBC # FLD: 14.6 % — HIGH (ref 10.3–14.5)
RBC # FLD: 14.6 % — HIGH (ref 10.3–14.5)
RBC # FLD: 14.9 % — HIGH (ref 10.3–14.5)
SARS-COV-2 IGG+IGM SERPL QL IA: >250 U/ML — HIGH
SARS-COV-2 IGG+IGM SERPL QL IA: POSITIVE
SODIUM SERPL-SCNC: 131 MMOL/L — LOW (ref 135–145)
SODIUM SERPL-SCNC: 133 MMOL/L — LOW (ref 135–145)
T PALLIDUM AB TITR SER: NEGATIVE — SIGNIFICANT CHANGE UP
URATE SERPL-MCNC: 5.4 MG/DL — SIGNIFICANT CHANGE UP (ref 2.5–7)
URATE SERPL-MCNC: 5.6 MG/DL — SIGNIFICANT CHANGE UP (ref 2.5–7)
URATE SERPL-MCNC: 5.6 MG/DL — SIGNIFICANT CHANGE UP (ref 2.5–7)
WBC # BLD: 11.41 K/UL — HIGH (ref 3.8–10.5)
WBC # BLD: 7.59 K/UL — SIGNIFICANT CHANGE UP (ref 3.8–10.5)
WBC # BLD: 7.84 K/UL — SIGNIFICANT CHANGE UP (ref 3.8–10.5)
WBC # FLD AUTO: 11.41 K/UL — HIGH (ref 3.8–10.5)
WBC # FLD AUTO: 7.59 K/UL — SIGNIFICANT CHANGE UP (ref 3.8–10.5)
WBC # FLD AUTO: 7.84 K/UL — SIGNIFICANT CHANGE UP (ref 3.8–10.5)

## 2022-11-11 PROCEDURE — 76805 OB US >/= 14 WKS SNGL FETUS: CPT | Mod: 26

## 2022-11-11 PROCEDURE — 99233 SBSQ HOSP IP/OBS HIGH 50: CPT | Mod: GC

## 2022-11-11 PROCEDURE — 76819 FETAL BIOPHYS PROFIL W/O NST: CPT | Mod: 26

## 2022-11-11 PROCEDURE — 86077 PHYS BLOOD BANK SERV XMATCH: CPT

## 2022-11-11 RX ORDER — ACETAMINOPHEN 500 MG
975 TABLET ORAL ONCE
Refills: 0 | Status: COMPLETED | OUTPATIENT
Start: 2022-11-11 | End: 2022-11-11

## 2022-11-11 RX ADMIN — Medication 50 GM/HR: at 14:48

## 2022-11-11 RX ADMIN — Medication 12 MILLIGRAM(S): at 22:03

## 2022-11-11 RX ADMIN — Medication 1 TABLET(S): at 13:10

## 2022-11-11 RX ADMIN — Medication 50 GM/HR: at 07:08

## 2022-11-11 RX ADMIN — Medication 30 MILLIGRAM(S): at 21:00

## 2022-11-11 RX ADMIN — Medication 50 GM/HR: at 19:08

## 2022-11-11 RX ADMIN — Medication 1 MILLIGRAM(S): at 13:09

## 2022-11-11 RX ADMIN — Medication 975 MILLIGRAM(S): at 01:31

## 2022-11-11 RX ADMIN — Medication 325 MILLIGRAM(S): at 13:09

## 2022-11-11 RX ADMIN — HEPARIN SODIUM 5000 UNIT(S): 5000 INJECTION INTRAVENOUS; SUBCUTANEOUS at 01:36

## 2022-11-11 RX ADMIN — HEPARIN SODIUM 5000 UNIT(S): 5000 INJECTION INTRAVENOUS; SUBCUTANEOUS at 13:32

## 2022-11-11 NOTE — PROGRESS NOTE ADULT - SUBJECTIVE AND OBJECTIVE BOX
R3 Antepartum Note, HD#2    Patient seen and examined at bedside, no acute overnight events. Patient reports mild headache that was previouly relieved with Tylenol. Sh recently received Tylenol again and declines reglan/benadryl at this time. Pt reports +FM, denies LOF, VB, ctx, HA, epigastric pain, blurred vision, CP, SOB, N/V, fevers, and chills.    Vital Signs Last 24 Hours  T(C): 36.7 (11-11-22 @ 02:01), Max: 36.9 (11-10-22 @ 14:48)  HR: 71 (11-11-22 @ 04:23) (52 - 84)  BP: 104/54 (11-11-22 @ 04:23) (104/54 - 185/98)  RR: 15 (11-10-22 @ 22:58) (15 - 18)  SpO2: 96% (11-11-22 @ 04:22) (91% - 100%)    CAPILLARY BLOOD GLUCOSE      Physical Exam:  General: NAD  Abdomen: Soft, non-tender, gravid  Ext: No pain or swelling    NST reactive overnight    Labs:             12.5   7.59  )-----------( 239      ( 11-11 @ 03:00 )             37.5     11-11 @ 03:00    131  |  101  |  5   ----------------------------<  119  4.1   |  18  |  0.77    Ca    8.9      11-11 @ 03:00  Mg     4.80     11-11 @ 03:00    TPro  7.3  /  Alb  3.4  /  TBili  <0.2  /  DBili  x   /  AST  30  /  ALT  21  /  AlkPhos  116  11-11 @ 03:00    PT/INR - ( 11-11 @ 03:00 )   PT: 10.5 sec;   INR: 0.91 ratio    PTT - ( 11-11 @ 03:00 )  PTT:27.0 sec    Uric Acid: (11-11 @ 03:00)  5.4      Fibrinogen: (11-11 @ 03:00)  --       LDH: (11-11 @ 03:00)  283        MEDICATIONS  (STANDING):  betamethasone Injectable 12 milliGRAM(s) IntraMuscular every 24 hours  ferrous    sulfate 325 milliGRAM(s) Oral daily  folic acid 1 milliGRAM(s) Oral daily  heparin   Injectable 5000 Unit(s) SubCutaneous every 12 hours  influenza   Vaccine 0.5 milliLiter(s) IntraMuscular once  magnesium sulfate Infusion 2 Gm/Hr (50 mL/Hr) IV Continuous <Continuous>  NIFEdipine XL 30 milliGRAM(s) Oral daily  prenatal multivitamin 1 Tablet(s) Oral daily

## 2022-11-12 RX ORDER — SENNA PLUS 8.6 MG/1
2 TABLET ORAL DAILY
Refills: 0 | Status: DISCONTINUED | OUTPATIENT
Start: 2022-11-12 | End: 2022-11-25

## 2022-11-12 RX ADMIN — Medication 30 MILLIGRAM(S): at 22:15

## 2022-11-12 RX ADMIN — Medication 325 MILLIGRAM(S): at 11:13

## 2022-11-12 RX ADMIN — HEPARIN SODIUM 5000 UNIT(S): 5000 INJECTION INTRAVENOUS; SUBCUTANEOUS at 11:12

## 2022-11-12 RX ADMIN — HEPARIN SODIUM 5000 UNIT(S): 5000 INJECTION INTRAVENOUS; SUBCUTANEOUS at 22:19

## 2022-11-12 RX ADMIN — Medication 1 MILLIGRAM(S): at 11:12

## 2022-11-12 RX ADMIN — SENNA PLUS 2 TABLET(S): 8.6 TABLET ORAL at 22:15

## 2022-11-12 RX ADMIN — Medication 1 TABLET(S): at 11:12

## 2022-11-12 RX ADMIN — HEPARIN SODIUM 5000 UNIT(S): 5000 INJECTION INTRAVENOUS; SUBCUTANEOUS at 00:10

## 2022-11-12 NOTE — PROGRESS NOTE ADULT - SUBJECTIVE AND OBJECTIVE BOX
R3 Antepartum Note, HD#3    Patient seen and examined at bedside, no acute overnight events. No acute complaints. Pt reports +FM, denies LOF, VB, ctx, HA, epigastric pain, blurred vision, CP, SOB, N/V, fevers, and chills.    Vital Signs Last 24 Hours  T(C): 36.4 (11-12-22 @ 01:48), Max: 37 (11-11-22 @ 18:20)  HR: 64 (11-12-22 @ 01:48) (63 - 91)  BP: 142/82 (11-12-22 @ 01:48) (104/54 - 159/88)  RR: 18 (11-12-22 @ 01:48) (16 - 18)  SpO2: 98% (11-12-22 @ 01:48) (91% - 100%)    CAPILLARY BLOOD GLUCOSE      Physical Exam:  General: NAD  Abdomen: Soft, non-tender, gravid  Ext: No pain or swelling    NST reactive overnight    Labs:             12.0   11.41 )-----------( 253      ( 11-11 @ 16:19 )             35.8     11-11 @ 16:19    133  |  103  |  5   ----------------------------<  114  4.2   |  18  |  0.67    Ca    7.4      11-11 @ 16:19  Mg     6.20     11-11 @ 16:19    TPro  7.0  /  Alb  3.4  /  TBili  <0.2  /  DBili  x   /  AST  29  /  ALT  22  /  AlkPhos  106  11-11 @ 16:19    PT/INR - ( 11-11 @ 16:19 )   PT: 10.6 sec;   INR: 0.91 ratio    PTT - ( 11-11 @ 16:19 )  PTT:29.5 sec    Uric Acid: (11-11 @ 16:19)  5.6      Fibrinogen: (11-11 @ 16:19)  --       LDH: (11-11 @ 16:19)  261        MEDICATIONS  (STANDING):  ferrous    sulfate 325 milliGRAM(s) Oral daily  folic acid 1 milliGRAM(s) Oral daily  heparin   Injectable 5000 Unit(s) SubCutaneous every 12 hours  influenza   Vaccine 0.5 milliLiter(s) IntraMuscular once  NIFEdipine XL 30 milliGRAM(s) Oral daily  prenatal multivitamin 1 Tablet(s) Oral daily

## 2022-11-13 LAB
ALBUMIN SERPL ELPH-MCNC: 3.2 G/DL — LOW (ref 3.3–5)
ALBUMIN SERPL ELPH-MCNC: 3.2 G/DL — LOW (ref 3.3–5)
ALP SERPL-CCNC: 106 U/L — SIGNIFICANT CHANGE UP (ref 40–120)
ALP SERPL-CCNC: 108 U/L — SIGNIFICANT CHANGE UP (ref 40–120)
ALT FLD-CCNC: 20 U/L — SIGNIFICANT CHANGE UP (ref 4–33)
ALT FLD-CCNC: 23 U/L — SIGNIFICANT CHANGE UP (ref 4–33)
ANION GAP SERPL CALC-SCNC: 11 MMOL/L — SIGNIFICANT CHANGE UP (ref 7–14)
ANION GAP SERPL CALC-SCNC: 12 MMOL/L — SIGNIFICANT CHANGE UP (ref 7–14)
APTT BLD: 27.7 SEC — SIGNIFICANT CHANGE UP (ref 27–36.3)
AST SERPL-CCNC: 27 U/L — SIGNIFICANT CHANGE UP (ref 4–32)
AST SERPL-CCNC: 29 U/L — SIGNIFICANT CHANGE UP (ref 4–32)
BASOPHILS # BLD AUTO: 0.01 K/UL — SIGNIFICANT CHANGE UP (ref 0–0.2)
BASOPHILS # BLD AUTO: 0.02 K/UL — SIGNIFICANT CHANGE UP (ref 0–0.2)
BASOPHILS NFR BLD AUTO: 0.1 % — SIGNIFICANT CHANGE UP (ref 0–2)
BASOPHILS NFR BLD AUTO: 0.2 % — SIGNIFICANT CHANGE UP (ref 0–2)
BILIRUB SERPL-MCNC: <0.2 MG/DL — SIGNIFICANT CHANGE UP (ref 0.2–1.2)
BILIRUB SERPL-MCNC: <0.2 MG/DL — SIGNIFICANT CHANGE UP (ref 0.2–1.2)
BLD GP AB SCN SERPL QL: POSITIVE — SIGNIFICANT CHANGE UP
BUN SERPL-MCNC: 5 MG/DL — LOW (ref 7–23)
BUN SERPL-MCNC: 6 MG/DL — LOW (ref 7–23)
CALCIUM SERPL-MCNC: 8.5 MG/DL — SIGNIFICANT CHANGE UP (ref 8.4–10.5)
CALCIUM SERPL-MCNC: 8.6 MG/DL — SIGNIFICANT CHANGE UP (ref 8.4–10.5)
CHLORIDE SERPL-SCNC: 108 MMOL/L — HIGH (ref 98–107)
CHLORIDE SERPL-SCNC: 108 MMOL/L — HIGH (ref 98–107)
CO2 SERPL-SCNC: 18 MMOL/L — LOW (ref 22–31)
CO2 SERPL-SCNC: 18 MMOL/L — LOW (ref 22–31)
CREAT SERPL-MCNC: 0.7 MG/DL — SIGNIFICANT CHANGE UP (ref 0.5–1.3)
CREAT SERPL-MCNC: 0.77 MG/DL — SIGNIFICANT CHANGE UP (ref 0.5–1.3)
EGFR: 104 ML/MIN/1.73M2 — SIGNIFICANT CHANGE UP
EGFR: 116 ML/MIN/1.73M2 — SIGNIFICANT CHANGE UP
EOSINOPHIL # BLD AUTO: 0.01 K/UL — SIGNIFICANT CHANGE UP (ref 0–0.5)
EOSINOPHIL # BLD AUTO: 0.02 K/UL — SIGNIFICANT CHANGE UP (ref 0–0.5)
EOSINOPHIL NFR BLD AUTO: 0.1 % — SIGNIFICANT CHANGE UP (ref 0–6)
EOSINOPHIL NFR BLD AUTO: 0.2 % — SIGNIFICANT CHANGE UP (ref 0–6)
FIBRINOGEN PPP-MCNC: 381 MG/DL — SIGNIFICANT CHANGE UP (ref 200–465)
FIBRINOGEN PPP-MCNC: 457 MG/DL — SIGNIFICANT CHANGE UP (ref 200–465)
GLUCOSE SERPL-MCNC: 108 MG/DL — HIGH (ref 70–99)
GLUCOSE SERPL-MCNC: 87 MG/DL — SIGNIFICANT CHANGE UP (ref 70–99)
HCT VFR BLD CALC: 34.6 % — SIGNIFICANT CHANGE UP (ref 34.5–45)
HCT VFR BLD CALC: 35.3 % — SIGNIFICANT CHANGE UP (ref 34.5–45)
HGB BLD-MCNC: 11.3 G/DL — LOW (ref 11.5–15.5)
HGB BLD-MCNC: 11.5 G/DL — SIGNIFICANT CHANGE UP (ref 11.5–15.5)
IANC: 7.19 K/UL — SIGNIFICANT CHANGE UP (ref 1.8–7.4)
IANC: 7.59 K/UL — HIGH (ref 1.8–7.4)
IMM GRANULOCYTES NFR BLD AUTO: 1.2 % — HIGH (ref 0–0.9)
IMM GRANULOCYTES NFR BLD AUTO: 1.6 % — HIGH (ref 0–0.9)
LDH SERPL L TO P-CCNC: 294 U/L — HIGH (ref 135–225)
LDH SERPL L TO P-CCNC: 316 U/L — HIGH (ref 135–225)
LYMPHOCYTES # BLD AUTO: 19.7 % — SIGNIFICANT CHANGE UP (ref 13–44)
LYMPHOCYTES # BLD AUTO: 2.05 K/UL — SIGNIFICANT CHANGE UP (ref 1–3.3)
LYMPHOCYTES # BLD AUTO: 2.34 K/UL — SIGNIFICANT CHANGE UP (ref 1–3.3)
LYMPHOCYTES # BLD AUTO: 22.5 % — SIGNIFICANT CHANGE UP (ref 13–44)
MCHC RBC-ENTMCNC: 27.5 PG — SIGNIFICANT CHANGE UP (ref 27–34)
MCHC RBC-ENTMCNC: 28.1 PG — SIGNIFICANT CHANGE UP (ref 27–34)
MCHC RBC-ENTMCNC: 32 GM/DL — SIGNIFICANT CHANGE UP (ref 32–36)
MCHC RBC-ENTMCNC: 33.2 GM/DL — SIGNIFICANT CHANGE UP (ref 32–36)
MCV RBC AUTO: 84.6 FL — SIGNIFICANT CHANGE UP (ref 80–100)
MCV RBC AUTO: 85.9 FL — SIGNIFICANT CHANGE UP (ref 80–100)
MONOCYTES # BLD AUTO: 0.65 K/UL — SIGNIFICANT CHANGE UP (ref 0–0.9)
MONOCYTES # BLD AUTO: 0.68 K/UL — SIGNIFICANT CHANGE UP (ref 0–0.9)
MONOCYTES NFR BLD AUTO: 6.2 % — SIGNIFICANT CHANGE UP (ref 2–14)
MONOCYTES NFR BLD AUTO: 6.5 % — SIGNIFICANT CHANGE UP (ref 2–14)
NEUTROPHILS # BLD AUTO: 7.19 K/UL — SIGNIFICANT CHANGE UP (ref 1.8–7.4)
NEUTROPHILS # BLD AUTO: 7.59 K/UL — HIGH (ref 1.8–7.4)
NEUTROPHILS NFR BLD AUTO: 69 % — SIGNIFICANT CHANGE UP (ref 43–77)
NEUTROPHILS NFR BLD AUTO: 72.7 % — SIGNIFICANT CHANGE UP (ref 43–77)
NRBC # BLD: 0 /100 WBCS — SIGNIFICANT CHANGE UP (ref 0–0)
NRBC # BLD: 1 /100 WBCS — HIGH (ref 0–0)
NRBC # FLD: 0.06 K/UL — HIGH (ref 0–0)
NRBC # FLD: 0.1 K/UL — HIGH (ref 0–0)
PLATELET # BLD AUTO: 202 K/UL — SIGNIFICANT CHANGE UP (ref 150–400)
PLATELET # BLD AUTO: 224 K/UL — SIGNIFICANT CHANGE UP (ref 150–400)
POTASSIUM SERPL-MCNC: 3.9 MMOL/L — SIGNIFICANT CHANGE UP (ref 3.5–5.3)
POTASSIUM SERPL-MCNC: 4.1 MMOL/L — SIGNIFICANT CHANGE UP (ref 3.5–5.3)
POTASSIUM SERPL-SCNC: 3.9 MMOL/L — SIGNIFICANT CHANGE UP (ref 3.5–5.3)
POTASSIUM SERPL-SCNC: 4.1 MMOL/L — SIGNIFICANT CHANGE UP (ref 3.5–5.3)
PROT SERPL-MCNC: 6.4 G/DL — SIGNIFICANT CHANGE UP (ref 6–8.3)
PROT SERPL-MCNC: 6.5 G/DL — SIGNIFICANT CHANGE UP (ref 6–8.3)
RBC # BLD: 4.09 M/UL — SIGNIFICANT CHANGE UP (ref 3.8–5.2)
RBC # BLD: 4.11 M/UL — SIGNIFICANT CHANGE UP (ref 3.8–5.2)
RBC # FLD: 14.8 % — HIGH (ref 10.3–14.5)
RBC # FLD: 14.8 % — HIGH (ref 10.3–14.5)
RH IG SCN BLD-IMP: POSITIVE — SIGNIFICANT CHANGE UP
SODIUM SERPL-SCNC: 137 MMOL/L — SIGNIFICANT CHANGE UP (ref 135–145)
SODIUM SERPL-SCNC: 138 MMOL/L — SIGNIFICANT CHANGE UP (ref 135–145)
URATE SERPL-MCNC: 5.4 MG/DL — SIGNIFICANT CHANGE UP (ref 2.5–7)
WBC # BLD: 10.42 K/UL — SIGNIFICANT CHANGE UP (ref 3.8–10.5)
WBC # BLD: 10.43 K/UL — SIGNIFICANT CHANGE UP (ref 3.8–10.5)
WBC # FLD AUTO: 10.42 K/UL — SIGNIFICANT CHANGE UP (ref 3.8–10.5)
WBC # FLD AUTO: 10.43 K/UL — SIGNIFICANT CHANGE UP (ref 3.8–10.5)

## 2022-11-13 PROCEDURE — 99232 SBSQ HOSP IP/OBS MODERATE 35: CPT | Mod: GC

## 2022-11-13 RX ORDER — NIFEDIPINE 30 MG
10 TABLET, EXTENDED RELEASE 24 HR ORAL ONCE
Refills: 0 | Status: COMPLETED | OUTPATIENT
Start: 2022-11-13 | End: 2022-11-13

## 2022-11-13 RX ORDER — LABETALOL HCL 100 MG
20 TABLET ORAL ONCE
Refills: 0 | Status: COMPLETED | OUTPATIENT
Start: 2022-11-13 | End: 2022-11-13

## 2022-11-13 RX ORDER — NIFEDIPINE 30 MG
60 TABLET, EXTENDED RELEASE 24 HR ORAL DAILY
Refills: 0 | Status: DISCONTINUED | OUTPATIENT
Start: 2022-11-14 | End: 2022-11-28

## 2022-11-13 RX ADMIN — Medication 1 MILLIGRAM(S): at 11:09

## 2022-11-13 RX ADMIN — Medication 10 MILLIGRAM(S): at 17:52

## 2022-11-13 RX ADMIN — Medication 10 MILLIGRAM(S): at 18:22

## 2022-11-13 RX ADMIN — HEPARIN SODIUM 5000 UNIT(S): 5000 INJECTION INTRAVENOUS; SUBCUTANEOUS at 11:09

## 2022-11-13 RX ADMIN — Medication 20 MILLIGRAM(S): at 18:58

## 2022-11-13 RX ADMIN — Medication 325 MILLIGRAM(S): at 11:09

## 2022-11-13 RX ADMIN — Medication 1 TABLET(S): at 11:09

## 2022-11-13 RX ADMIN — Medication 30 MILLIGRAM(S): at 19:02

## 2022-11-13 NOTE — CHART NOTE - NSCHARTNOTEFT_GEN_A_CORE
R3 Chart note    Patient seen at bedside for severe range Bps. Patient received Procardia 10/10 and Labetalol 20 IVP with repeat /83 afterward. Patient denies complaints. Denies HA, SOB, CP, RUQ/epigastric pain, b/l swelling LE and UE, or vision changes    Vital Signs Last 24 Hrs  T(C): 37 (2022 17:24), Max: 37.2 (2022 01:48)  T(F): 98.6 (2022 17:24), Max: 99 (2022 06:09)  HR: 65 (2022 19:12) (58 - 65)  BP: 146/83 (2022 19:12) (131/70 - 171/94)  BP(mean): --  RR: 17 (2022 17:24) (16 - 18)  SpO2: 98% (2022 17:24) (98% - 100%)    Parameters below as of 2022 17:24  Patient On (Oxygen Delivery Method): room air    Gen NAD  NST Reactive  [place] quiet    A/P  31yo  at 32w2d admitted with sPEC s/p Mg and mild headache now resolved. Patient is s/p Proc 10/10 and Lab 20 for elevated BPs, otherwise stable at this time.     - HELLP labs reviewed and wnl   - continue close monitoring of BP  - Increase to Procardia 60 tomorrow    d/w Dr. Shelley Knott PGY3

## 2022-11-13 NOTE — PROGRESS NOTE ADULT - SUBJECTIVE AND OBJECTIVE BOX
R3 Antepartum Progress Note    Patient seen and examined at bedside, no acute overnight events. No acute complaints. Patient endorses good fetal movement, denies any loss of fluid, contractions. Patient is ambulating and tolerating regular diet. Denies CP, SOB, N/V, fevers, chills, or any other concerns.    Vital Signs Last 24 Hours  T(C): 37.2 (11-13-22 @ 01:48), Max: 37.2 (11-13-22 @ 01:48)  HR: 64 (11-13-22 @ 01:48) (64 - 74)  BP: 131/70 (11-13-22 @ 01:48) (113/58 - 147/92)  RR: 17 (11-13-22 @ 01:48) (16 - 18)  SpO2: 98% (11-13-22 @ 01:48) (98% - 100%)    I&O's Summary    11 Nov 2022 07:01  -  12 Nov 2022 07:00  --------------------------------------------------------  IN: 550 mL / OUT: 6900 mL / NET: -6350 mL    12 Nov 2022 07:01  -  13 Nov 2022 04:31  --------------------------------------------------------  IN: 720 mL / OUT: 2850 mL / NET: -2130 mL        Physical Exam:  General: NAD  CV: RR  Lungs: breathing comfortably on RA  Abdomen: soft, gravid, non-tender  Ext: no pain or swelling    Labs:             12.0   11.41<H> )-----------( 253      ( 11-11 @ 16:19 )             35.8                13.0   7.84  )-----------( 232      ( 11-11 @ 09:09 )             38.9                12.5   7.59  )-----------( 239      ( 11-11 @ 03:00 )             37.5                12.5   7.31  )-----------( 223      ( 11-10 @ 16:38 )             37.4         MEDICATIONS  (STANDING):  ferrous    sulfate 325 milliGRAM(s) Oral daily  folic acid 1 milliGRAM(s) Oral daily  heparin   Injectable 5000 Unit(s) SubCutaneous every 12 hours  influenza   Vaccine 0.5 milliLiter(s) IntraMuscular once  NIFEdipine XL 30 milliGRAM(s) Oral daily  prenatal multivitamin 1 Tablet(s) Oral daily    MEDICATIONS  (PRN):  senna 2 Tablet(s) Oral daily PRN Constipation

## 2022-11-14 LAB
ALBUMIN SERPL ELPH-MCNC: 3.5 G/DL — SIGNIFICANT CHANGE UP (ref 3.3–5)
ALP SERPL-CCNC: 113 U/L — SIGNIFICANT CHANGE UP (ref 40–120)
ALT FLD-CCNC: 34 U/L — HIGH (ref 4–33)
ANION GAP SERPL CALC-SCNC: 11 MMOL/L — SIGNIFICANT CHANGE UP (ref 7–14)
AST SERPL-CCNC: 36 U/L — HIGH (ref 4–32)
BASOPHILS # BLD AUTO: 0.03 K/UL — SIGNIFICANT CHANGE UP (ref 0–0.2)
BASOPHILS NFR BLD AUTO: 0.3 % — SIGNIFICANT CHANGE UP (ref 0–2)
BILIRUB SERPL-MCNC: <0.2 MG/DL — SIGNIFICANT CHANGE UP (ref 0.2–1.2)
BUN SERPL-MCNC: 5 MG/DL — LOW (ref 7–23)
CALCIUM SERPL-MCNC: 9.1 MG/DL — SIGNIFICANT CHANGE UP (ref 8.4–10.5)
CHLORIDE SERPL-SCNC: 106 MMOL/L — SIGNIFICANT CHANGE UP (ref 98–107)
CO2 SERPL-SCNC: 21 MMOL/L — LOW (ref 22–31)
CREAT SERPL-MCNC: 0.77 MG/DL — SIGNIFICANT CHANGE UP (ref 0.5–1.3)
EGFR: 104 ML/MIN/1.73M2 — SIGNIFICANT CHANGE UP
EOSINOPHIL # BLD AUTO: 0.03 K/UL — SIGNIFICANT CHANGE UP (ref 0–0.5)
EOSINOPHIL NFR BLD AUTO: 0.3 % — SIGNIFICANT CHANGE UP (ref 0–6)
FIBRINOGEN PPP-MCNC: 423 MG/DL — SIGNIFICANT CHANGE UP (ref 200–465)
GLUCOSE SERPL-MCNC: 88 MG/DL — SIGNIFICANT CHANGE UP (ref 70–99)
HCT VFR BLD CALC: 37.6 % — SIGNIFICANT CHANGE UP (ref 34.5–45)
HGB BLD-MCNC: 12.3 G/DL — SIGNIFICANT CHANGE UP (ref 11.5–15.5)
IANC: 7.08 K/UL — SIGNIFICANT CHANGE UP (ref 1.8–7.4)
IMM GRANULOCYTES NFR BLD AUTO: 1.5 % — HIGH (ref 0–0.9)
INR BLD: 0.93 RATIO — SIGNIFICANT CHANGE UP (ref 0.88–1.16)
LDH SERPL L TO P-CCNC: 311 U/L — HIGH (ref 135–225)
LYMPHOCYTES # BLD AUTO: 2.71 K/UL — SIGNIFICANT CHANGE UP (ref 1–3.3)
LYMPHOCYTES # BLD AUTO: 25.1 % — SIGNIFICANT CHANGE UP (ref 13–44)
MCHC RBC-ENTMCNC: 28.1 PG — SIGNIFICANT CHANGE UP (ref 27–34)
MCHC RBC-ENTMCNC: 32.7 GM/DL — SIGNIFICANT CHANGE UP (ref 32–36)
MCV RBC AUTO: 86 FL — SIGNIFICANT CHANGE UP (ref 80–100)
MONOCYTES # BLD AUTO: 0.77 K/UL — SIGNIFICANT CHANGE UP (ref 0–0.9)
MONOCYTES NFR BLD AUTO: 7.1 % — SIGNIFICANT CHANGE UP (ref 2–14)
NEUTROPHILS # BLD AUTO: 7.08 K/UL — SIGNIFICANT CHANGE UP (ref 1.8–7.4)
NEUTROPHILS NFR BLD AUTO: 65.7 % — SIGNIFICANT CHANGE UP (ref 43–77)
NRBC # BLD: 2 /100 WBCS — HIGH (ref 0–0)
NRBC # FLD: 0.18 K/UL — HIGH (ref 0–0)
PLATELET # BLD AUTO: 210 K/UL — SIGNIFICANT CHANGE UP (ref 150–400)
POTASSIUM SERPL-MCNC: 4 MMOL/L — SIGNIFICANT CHANGE UP (ref 3.5–5.3)
POTASSIUM SERPL-SCNC: 4 MMOL/L — SIGNIFICANT CHANGE UP (ref 3.5–5.3)
PROT SERPL-MCNC: 6.9 G/DL — SIGNIFICANT CHANGE UP (ref 6–8.3)
PROTHROM AB SERPL-ACNC: 10.8 SEC — SIGNIFICANT CHANGE UP (ref 10.5–13.4)
RBC # BLD: 4.37 M/UL — SIGNIFICANT CHANGE UP (ref 3.8–5.2)
RBC # FLD: 15.2 % — HIGH (ref 10.3–14.5)
SODIUM SERPL-SCNC: 138 MMOL/L — SIGNIFICANT CHANGE UP (ref 135–145)
URATE SERPL-MCNC: 5.5 MG/DL — SIGNIFICANT CHANGE UP (ref 2.5–7)
WBC # BLD: 10.78 K/UL — HIGH (ref 3.8–10.5)
WBC # FLD AUTO: 10.78 K/UL — HIGH (ref 3.8–10.5)

## 2022-11-14 PROCEDURE — 99233 SBSQ HOSP IP/OBS HIGH 50: CPT

## 2022-11-14 RX ADMIN — Medication 1 TABLET(S): at 09:52

## 2022-11-14 RX ADMIN — HEPARIN SODIUM 5000 UNIT(S): 5000 INJECTION INTRAVENOUS; SUBCUTANEOUS at 06:45

## 2022-11-14 RX ADMIN — HEPARIN SODIUM 5000 UNIT(S): 5000 INJECTION INTRAVENOUS; SUBCUTANEOUS at 17:41

## 2022-11-14 RX ADMIN — Medication 1 MILLIGRAM(S): at 09:52

## 2022-11-14 RX ADMIN — Medication 325 MILLIGRAM(S): at 09:52

## 2022-11-14 RX ADMIN — Medication 60 MILLIGRAM(S): at 09:52

## 2022-11-14 NOTE — PROGRESS NOTE ADULT - SUBJECTIVE AND OBJECTIVE BOX
R3 Antepartum Progress Note   HD#5     Patient seen and examined at bedside. Patient had severe range BPs last night evening requiring adjustment of BP medications, otherwise no acute complaints. Patient endorses good fetal movement, denies any loss of fluid, contractions. Patient is ambulating and tolerating regular diet. Denies CP, SOB, HA, N/V, fevers, chills, or any other concerns.    Vital Signs Last 24 Hours  T(C): 37 (11-14-22 @ 05:52), Max: 37 (11-13-22 @ 17:24)  HR: 66 (11-14-22 @ 05:52) (58 - 67)  BP: 129/71 (11-14-22 @ 05:52) (129/71 - 171/94)  RR: 16 (11-14-22 @ 05:52) (16 - 17)  SpO2: 100% (11-14-22 @ 05:52) (98% - 100%)    I&O's Summary    13 Nov 2022 07:01  -  14 Nov 2022 07:00  --------------------------------------------------------  IN: 720 mL / OUT: 2025 mL / NET: -1305 mL        Physical Exam:  General: NAD  CV: RR  Lungs: breathing comfortably on RA  Abdomen: soft, gravid, non-tender  Ext: no pain or swelling    Labs:             11.5   10.42 )-----------( 202      ( 11-13 @ 18:07 )             34.6                11.3<L>  10.43 )-----------( 224      ( 11-13 @ 05:50 )             35.3                12.0   11.41<H> )-----------( 253      ( 11-11 @ 16:19 )             35.8                13.0   7.84  )-----------( 232      ( 11-11 @ 09:09 )             38.9                12.5   7.59  )-----------( 239      ( 11-11 @ 03:00 )             37.5         MEDICATIONS  (STANDING):  ferrous    sulfate 325 milliGRAM(s) Oral daily  folic acid 1 milliGRAM(s) Oral daily  heparin   Injectable 5000 Unit(s) SubCutaneous every 12 hours  influenza   Vaccine 0.5 milliLiter(s) IntraMuscular once  NIFEdipine XL 60 milliGRAM(s) Oral daily  prenatal multivitamin 1 Tablet(s) Oral daily    MEDICATIONS  (PRN):  senna 2 Tablet(s) Oral daily PRN Constipation

## 2022-11-15 ENCOUNTER — APPOINTMENT (OUTPATIENT)
Dept: ANTEPARTUM | Facility: HOSPITAL | Age: 32
End: 2022-11-15

## 2022-11-15 ENCOUNTER — ASOB RESULT (OUTPATIENT)
Age: 32
End: 2022-11-15

## 2022-11-15 LAB
ALBUMIN SERPL ELPH-MCNC: 3 G/DL — LOW (ref 3.3–5)
ALP SERPL-CCNC: 103 U/L — SIGNIFICANT CHANGE UP (ref 40–120)
ALT FLD-CCNC: 25 U/L — SIGNIFICANT CHANGE UP (ref 4–33)
ANION GAP SERPL CALC-SCNC: 12 MMOL/L — SIGNIFICANT CHANGE UP (ref 7–14)
APTT BLD: 30.3 SEC — SIGNIFICANT CHANGE UP (ref 27–36.3)
AST SERPL-CCNC: 25 U/L — SIGNIFICANT CHANGE UP (ref 4–32)
BASOPHILS # BLD AUTO: 0.03 K/UL — SIGNIFICANT CHANGE UP (ref 0–0.2)
BASOPHILS NFR BLD AUTO: 0.3 % — SIGNIFICANT CHANGE UP (ref 0–2)
BILIRUB SERPL-MCNC: <0.2 MG/DL — SIGNIFICANT CHANGE UP (ref 0.2–1.2)
BLD GP AB SCN SERPL QL: POSITIVE — SIGNIFICANT CHANGE UP
BUN SERPL-MCNC: 4 MG/DL — LOW (ref 7–23)
CALCIUM SERPL-MCNC: 8.8 MG/DL — SIGNIFICANT CHANGE UP (ref 8.4–10.5)
CHLORIDE SERPL-SCNC: 107 MMOL/L — SIGNIFICANT CHANGE UP (ref 98–107)
CO2 SERPL-SCNC: 19 MMOL/L — LOW (ref 22–31)
CREAT SERPL-MCNC: 0.77 MG/DL — SIGNIFICANT CHANGE UP (ref 0.5–1.3)
EGFR: 104 ML/MIN/1.73M2 — SIGNIFICANT CHANGE UP
EOSINOPHIL # BLD AUTO: 0.08 K/UL — SIGNIFICANT CHANGE UP (ref 0–0.5)
EOSINOPHIL NFR BLD AUTO: 0.8 % — SIGNIFICANT CHANGE UP (ref 0–6)
GLUCOSE SERPL-MCNC: 79 MG/DL — SIGNIFICANT CHANGE UP (ref 70–99)
HCT VFR BLD CALC: 36.6 % — SIGNIFICANT CHANGE UP (ref 34.5–45)
HGB BLD-MCNC: 11.8 G/DL — SIGNIFICANT CHANGE UP (ref 11.5–15.5)
IANC: 6.37 K/UL — SIGNIFICANT CHANGE UP (ref 1.8–7.4)
IMM GRANULOCYTES NFR BLD AUTO: 1.8 % — HIGH (ref 0–0.9)
INR BLD: 0.96 RATIO — SIGNIFICANT CHANGE UP (ref 0.88–1.16)
LDH SERPL L TO P-CCNC: 276 U/L — HIGH (ref 135–225)
LYMPHOCYTES # BLD AUTO: 2.62 K/UL — SIGNIFICANT CHANGE UP (ref 1–3.3)
LYMPHOCYTES # BLD AUTO: 26.4 % — SIGNIFICANT CHANGE UP (ref 13–44)
MCHC RBC-ENTMCNC: 27.6 PG — SIGNIFICANT CHANGE UP (ref 27–34)
MCHC RBC-ENTMCNC: 32.2 GM/DL — SIGNIFICANT CHANGE UP (ref 32–36)
MCV RBC AUTO: 85.7 FL — SIGNIFICANT CHANGE UP (ref 80–100)
MONOCYTES # BLD AUTO: 0.65 K/UL — SIGNIFICANT CHANGE UP (ref 0–0.9)
MONOCYTES NFR BLD AUTO: 6.5 % — SIGNIFICANT CHANGE UP (ref 2–14)
NEUTROPHILS # BLD AUTO: 6.37 K/UL — SIGNIFICANT CHANGE UP (ref 1.8–7.4)
NEUTROPHILS NFR BLD AUTO: 64.2 % — SIGNIFICANT CHANGE UP (ref 43–77)
NRBC # BLD: 3 /100 WBCS — HIGH (ref 0–0)
NRBC # FLD: 0.27 K/UL — HIGH (ref 0–0)
PLATELET # BLD AUTO: 191 K/UL — SIGNIFICANT CHANGE UP (ref 150–400)
POTASSIUM SERPL-MCNC: 4.1 MMOL/L — SIGNIFICANT CHANGE UP (ref 3.5–5.3)
POTASSIUM SERPL-SCNC: 4.1 MMOL/L — SIGNIFICANT CHANGE UP (ref 3.5–5.3)
PROT SERPL-MCNC: 6.1 G/DL — SIGNIFICANT CHANGE UP (ref 6–8.3)
PROTHROM AB SERPL-ACNC: 11.1 SEC — SIGNIFICANT CHANGE UP (ref 10.5–13.4)
RBC # BLD: 4.27 M/UL — SIGNIFICANT CHANGE UP (ref 3.8–5.2)
RBC # FLD: 15 % — HIGH (ref 10.3–14.5)
RH IG SCN BLD-IMP: POSITIVE — SIGNIFICANT CHANGE UP
SODIUM SERPL-SCNC: 138 MMOL/L — SIGNIFICANT CHANGE UP (ref 135–145)
URATE SERPL-MCNC: 5.8 MG/DL — SIGNIFICANT CHANGE UP (ref 2.5–7)
WBC # BLD: 9.93 K/UL — SIGNIFICANT CHANGE UP (ref 3.8–10.5)
WBC # FLD AUTO: 9.93 K/UL — SIGNIFICANT CHANGE UP (ref 3.8–10.5)

## 2022-11-15 PROCEDURE — 76819 FETAL BIOPHYS PROFIL W/O NST: CPT | Mod: 26

## 2022-11-15 PROCEDURE — 76820 UMBILICAL ARTERY ECHO: CPT | Mod: 26,59

## 2022-11-15 RX ORDER — NIFEDIPINE 30 MG
30 TABLET, EXTENDED RELEASE 24 HR ORAL
Refills: 0 | Status: DISCONTINUED | OUTPATIENT
Start: 2022-11-15 | End: 2022-11-15

## 2022-11-15 RX ORDER — NIFEDIPINE 30 MG
30 TABLET, EXTENDED RELEASE 24 HR ORAL
Refills: 0 | Status: DISCONTINUED | OUTPATIENT
Start: 2022-11-15 | End: 2022-11-28

## 2022-11-15 RX ADMIN — Medication 1 TABLET(S): at 10:13

## 2022-11-15 RX ADMIN — HEPARIN SODIUM 5000 UNIT(S): 5000 INJECTION INTRAVENOUS; SUBCUTANEOUS at 06:30

## 2022-11-15 RX ADMIN — Medication 325 MILLIGRAM(S): at 10:13

## 2022-11-15 RX ADMIN — Medication 60 MILLIGRAM(S): at 10:13

## 2022-11-15 RX ADMIN — Medication 1 MILLIGRAM(S): at 10:13

## 2022-11-15 RX ADMIN — Medication 30 MILLIGRAM(S): at 15:20

## 2022-11-15 RX ADMIN — HEPARIN SODIUM 5000 UNIT(S): 5000 INJECTION INTRAVENOUS; SUBCUTANEOUS at 17:28

## 2022-11-15 NOTE — PROGRESS NOTE ADULT - SUBJECTIVE AND OBJECTIVE BOX
R3 Antepartum Progress Note    Patient seen and examined at bedside, no acute overnight events. No acute complaints. Patient endorses good fetal movement, denies any loss of fluid, contractions. Patient is ambulating and tolerating regular diet. Denies CP, SOB, N/V, fevers, chills, or any other concerns.    Vital Signs Last 24 Hours  T(C): 36.7 (11-15-22 @ 05:37), Max: 37.1 (11-14-22 @ 17:34)  HR: 66 (11-15-22 @ 05:37) (61 - 68)  BP: 124/73 (11-15-22 @ 05:37) (124/73 - 152/91)  RR: 18 (11-15-22 @ 05:37) (16 - 18)  SpO2: 99% (11-15-22 @ 05:37) (97% - 100%)    I&O's Summary    14 Nov 2022 07:01  -  15 Nov 2022 07:00  --------------------------------------------------------  IN: 0 mL / OUT: 1350 mL / NET: -1350 mL        Physical Exam:  General: NAD  CV: RR  Lungs: breathing comfortably on RA  Abdomen: soft, gravid, non-tender  Ext: no pain or swelling    Labs:             11.8   9.93  )-----------( 191      ( 11-15 @ 05:26 )             36.6                12.3   10.78<H> )-----------( 210      ( 11-14 @ 12:49 )             37.6                11.5   10.42 )-----------( 202      ( 11-13 @ 18:07 )             34.6                11.3<L>  10.43 )-----------( 224      ( 11-13 @ 05:50 )             35.3                12.0   11.41<H> )-----------( 253      ( 11-11 @ 16:19 )             35.8         MEDICATIONS  (STANDING):  ferrous    sulfate 325 milliGRAM(s) Oral daily  folic acid 1 milliGRAM(s) Oral daily  heparin   Injectable 5000 Unit(s) SubCutaneous every 12 hours  influenza   Vaccine 0.5 milliLiter(s) IntraMuscular once  NIFEdipine XL 60 milliGRAM(s) Oral daily  prenatal multivitamin 1 Tablet(s) Oral daily    MEDICATIONS  (PRN):  senna 2 Tablet(s) Oral daily PRN Constipation

## 2022-11-16 PROCEDURE — 99233 SBSQ HOSP IP/OBS HIGH 50: CPT

## 2022-11-16 RX ORDER — ACETAMINOPHEN 500 MG
975 TABLET ORAL ONCE
Refills: 0 | Status: COMPLETED | OUTPATIENT
Start: 2022-11-16 | End: 2022-11-16

## 2022-11-16 RX ADMIN — HEPARIN SODIUM 5000 UNIT(S): 5000 INJECTION INTRAVENOUS; SUBCUTANEOUS at 17:19

## 2022-11-16 RX ADMIN — HEPARIN SODIUM 5000 UNIT(S): 5000 INJECTION INTRAVENOUS; SUBCUTANEOUS at 06:13

## 2022-11-16 RX ADMIN — Medication 60 MILLIGRAM(S): at 06:13

## 2022-11-16 RX ADMIN — Medication 325 MILLIGRAM(S): at 14:39

## 2022-11-16 RX ADMIN — Medication 1 TABLET(S): at 14:38

## 2022-11-16 RX ADMIN — Medication 30 MILLIGRAM(S): at 14:47

## 2022-11-16 RX ADMIN — SENNA PLUS 2 TABLET(S): 8.6 TABLET ORAL at 14:39

## 2022-11-16 RX ADMIN — Medication 975 MILLIGRAM(S): at 10:09

## 2022-11-16 RX ADMIN — Medication 1 MILLIGRAM(S): at 14:38

## 2022-11-16 RX ADMIN — Medication 975 MILLIGRAM(S): at 10:39

## 2022-11-16 NOTE — PROGRESS NOTE ADULT - SUBJECTIVE AND OBJECTIVE BOX
R3 Antepartum Progress Note   HD#7    Patient seen and examined at bedside, no acute overnight events. No acute complaints. Patient endorses good fetal movement, denies any loss of fluid, contractions. Patient is ambulating and tolerating regular diet. Denies CP, SOB, N/V, fevers, chills, or any other concerns.    Vital Signs Last 24 Hours  T(C): 36.7 (11-16-22 @ 05:32), Max: 37.2 (11-15-22 @ 22:17)  HR: 67 (11-16-22 @ 05:32) (57 - 67)  BP: 115/70 (11-16-22 @ 05:32) (115/70 - 154/91)  RR: 17 (11-16-22 @ 05:32) (12 - 17)  SpO2: 98% (11-16-22 @ 05:32) (98% - 100%)    I&O's Summary    15 Nov 2022 07:01  -  16 Nov 2022 07:00  --------------------------------------------------------  IN: 0 mL / OUT: 850 mL / NET: -850 mL        Physical Exam:  General: NAD  CV: RR  Lungs: breathing comfortably on RA  Abdomen: soft, gravid, non-tender  Ext: no pain or swelling    Labs:             11.8   9.93  )-----------( 191      ( 11-15 @ 05:26 )             36.6                12.3   10.78<H> )-----------( 210      ( 11-14 @ 12:49 )             37.6                11.5   10.42 )-----------( 202      ( 11-13 @ 18:07 )             34.6                11.3<L>  10.43 )-----------( 224      ( 11-13 @ 05:50 )             35.3                12.0   11.41<H> )-----------( 253      ( 11-11 @ 16:19 )             35.8         MEDICATIONS  (STANDING):  ferrous    sulfate 325 milliGRAM(s) Oral daily  folic acid 1 milliGRAM(s) Oral daily  heparin   Injectable 5000 Unit(s) SubCutaneous every 12 hours  influenza   Vaccine 0.5 milliLiter(s) IntraMuscular once  NIFEdipine XL 60 milliGRAM(s) Oral daily  NIFEdipine XL 30 milliGRAM(s) Oral <User Schedule>  prenatal multivitamin 1 Tablet(s) Oral daily    MEDICATIONS  (PRN):  senna 2 Tablet(s) Oral daily PRN Constipation

## 2022-11-16 NOTE — CONSULT NOTE PEDS - SUBJECTIVE AND OBJECTIVE BOX
Ms. Gutierrez is a 33 y/o  at 32w5d gestational age. Maternal history notable for sPEC.   NICU consulted to discuss what to expect if she were to deliver at 32 weeks gestation. OB team's plan is to keep baby until 34weeks if mother does not fall into  labor or develop severe features of PEC.  She currently reports no contractions, LOF and she still perceives fetal movement. BP is presently under control.     I met with Ms. Gutierrez and we reviewed the followin. The NICU team will be present at her delivery and will immediately assess and care for her infant.    2. Due to prematurity, the infant may require respiratory support, most commonly in the form of CPAP. The outcomes improve after  steroids. Less commonly, some infants at this gestational age will require intubation and mechanical ventilation with surfactant administration.    3. Depending on the clinical status of the infant, enteral feedings may not be started immediately. IV nutrition in the form of TPN would be provided via umbilical line or PICC until the infant is able to tolerate full enteral feedings. Due to immature suck/swallow, she may require an orogastric tube once feeds are initiated. She is also at risk for hypoglycemia.    4. Discussed the benefits of breastfeeding in  infants and encouraged mother to pump following delivery.    5. Due to prematurity, the infant will be at increased risk for infection and would likely be started on antibiotics after birth. The infant will be screened for infections following delivery and may require other courses of antibiotics during their hospital course if an infection were suspected. If the infant shows signs and symptoms of feeding intolerance, feedings may be held, and the infant may require evaluation for intestinal infection (necrotizing enterocolitis).    6. Need for possible blood transfusions, jaundice, phototherapy discussed.    7. Small risk of IVH discussed.    8. Small risk of symptomatic PDA discussed with possible need for medical vs. transcatheter closure.    9. The infant is at risk for developmental delays as a consequence of prematurity. The infant will be evaluated by a developmental pediatrician to monitor for neurodevelopmental delays.    10. Thermoregulation issues and need to be in an isolette with slow weaning to a crib discussed.    11. Length of stay is highly variable, but given the infant’s size and gestational age, average stay is approximately 5-6 weeks. Discussed discharged criteria.    Ms. Gutierrez had the chance to ask any questions and was encouraged to contact the NICU at that time if additional questions arise. She said she is a  and asked questions about delayed cord clamping possibility of not cutting the umbilical cord at all and preserving the placenta. She asked about prolonged delayed cord clamping past one minute. She asked about skin to skin immediately after birth.  Mother's questions were answered satisfactorily until she had no further questions.     Thank you for the opportunity to participate in the care of this patient and please inform us of any changes in her status.

## 2022-11-17 ENCOUNTER — APPOINTMENT (OUTPATIENT)
Dept: OBGYN | Facility: CLINIC | Age: 32
End: 2022-11-17

## 2022-11-17 ENCOUNTER — NON-APPOINTMENT (OUTPATIENT)
Age: 32
End: 2022-11-17

## 2022-11-17 LAB
CREAT 24H UR-MCNC: 1.5 G/24 H
CREAT 24H UR-MCNC: 1.5 G/24 H
CREAT ?TM UR-MCNC: 49 MG/DL
CREAT ?TM UR-MCNC: 49 MG/DL
PROT 24H UR-MRATE: 8 MG/DL
PROT ?TM UR-MCNC: 24 HR
PROT ?TM UR-MCNC: 24 HR
PROT UR-MCNC: 240 MG/24 H
SPECIMEN VOL 24H UR: 3000 ML
SPECIMEN VOL 24H UR: 3000 ML

## 2022-11-17 RX ADMIN — Medication 325 MILLIGRAM(S): at 13:22

## 2022-11-17 RX ADMIN — Medication 1 MILLIGRAM(S): at 13:22

## 2022-11-17 RX ADMIN — Medication 30 MILLIGRAM(S): at 14:29

## 2022-11-17 RX ADMIN — HEPARIN SODIUM 5000 UNIT(S): 5000 INJECTION INTRAVENOUS; SUBCUTANEOUS at 06:12

## 2022-11-17 RX ADMIN — HEPARIN SODIUM 5000 UNIT(S): 5000 INJECTION INTRAVENOUS; SUBCUTANEOUS at 18:13

## 2022-11-17 RX ADMIN — Medication 1 TABLET(S): at 13:22

## 2022-11-17 RX ADMIN — Medication 60 MILLIGRAM(S): at 06:12

## 2022-11-17 NOTE — PROGRESS NOTE ADULT - SUBJECTIVE AND OBJECTIVE BOX
R3 Antepartum Progress Note  HD#6    Patient seen and examined at bedside, no acute overnight events. No acute complaints. Patient endorses good fetal movement, denies any loss of fluid, contractions. Patient is ambulating and tolerating regular diet. Denies CP, HA, SOB, N/V, fevers, chills, or any other concerns.    Vital Signs Last 24 Hours  T(C): 36.7 (11-17-22 @ 06:09), Max: 37.2 (11-16-22 @ 17:51)  HR: 61 (11-17-22 @ 06:09) (61 - 75)  BP: 121/67 (11-17-22 @ 06:09) (121/67 - 140/91)  RR: 17 (11-17-22 @ 06:09) (16 - 18)  SpO2: 99% (11-17-22 @ 06:09) (97% - 100%)    I&O's Summary    16 Nov 2022 07:01  -  17 Nov 2022 07:00  --------------------------------------------------------  IN: 0 mL / OUT: 2350 mL / NET: -2350 mL        Physical Exam:  General: NAD  CV: RR  Lungs: breathing comfortably on RA  Abdomen: soft, gravid, non-tender  Ext: no pain or swelling    Labs:             11.8   9.93  )-----------( 191      ( 11-15 @ 05:26 )             36.6                12.3   10.78<H> )-----------( 210      ( 11-14 @ 12:49 )             37.6                11.5   10.42 )-----------( 202      ( 11-13 @ 18:07 )             34.6                11.3<L>  10.43 )-----------( 224      ( 11-13 @ 05:50 )             35.3         MEDICATIONS  (STANDING):  ferrous    sulfate 325 milliGRAM(s) Oral daily  folic acid 1 milliGRAM(s) Oral daily  heparin   Injectable 5000 Unit(s) SubCutaneous every 12 hours  influenza   Vaccine 0.5 milliLiter(s) IntraMuscular once  NIFEdipine XL 60 milliGRAM(s) Oral daily  NIFEdipine XL 30 milliGRAM(s) Oral <User Schedule>  prenatal multivitamin 1 Tablet(s) Oral daily    MEDICATIONS  (PRN):  senna 2 Tablet(s) Oral daily PRN Constipation

## 2022-11-18 ENCOUNTER — APPOINTMENT (OUTPATIENT)
Dept: ANTEPARTUM | Facility: CLINIC | Age: 32
End: 2022-11-18

## 2022-11-18 ENCOUNTER — ASOB RESULT (OUTPATIENT)
Age: 32
End: 2022-11-18

## 2022-11-18 LAB
ALBUMIN SERPL ELPH-MCNC: 3.6 G/DL — SIGNIFICANT CHANGE UP (ref 3.3–5)
ALP SERPL-CCNC: 125 U/L — HIGH (ref 40–120)
ALT FLD-CCNC: 20 U/L — SIGNIFICANT CHANGE UP (ref 4–33)
ANION GAP SERPL CALC-SCNC: 15 MMOL/L — HIGH (ref 7–14)
AST SERPL-CCNC: 29 U/L — SIGNIFICANT CHANGE UP (ref 4–32)
BASOPHILS # BLD AUTO: 0.03 K/UL — SIGNIFICANT CHANGE UP (ref 0–0.2)
BASOPHILS NFR BLD AUTO: 0.3 % — SIGNIFICANT CHANGE UP (ref 0–2)
BILIRUB SERPL-MCNC: 0.2 MG/DL — SIGNIFICANT CHANGE UP (ref 0.2–1.2)
BLD GP AB SCN SERPL QL: POSITIVE — SIGNIFICANT CHANGE UP
BUN SERPL-MCNC: 6 MG/DL — LOW (ref 7–23)
CALCIUM SERPL-MCNC: 9.2 MG/DL — SIGNIFICANT CHANGE UP (ref 8.4–10.5)
CHLORIDE SERPL-SCNC: 102 MMOL/L — SIGNIFICANT CHANGE UP (ref 98–107)
CO2 SERPL-SCNC: 19 MMOL/L — LOW (ref 22–31)
CREAT SERPL-MCNC: 0.78 MG/DL — SIGNIFICANT CHANGE UP (ref 0.5–1.3)
EGFR: 102 ML/MIN/1.73M2 — SIGNIFICANT CHANGE UP
EOSINOPHIL # BLD AUTO: 0.08 K/UL — SIGNIFICANT CHANGE UP (ref 0–0.5)
EOSINOPHIL NFR BLD AUTO: 0.8 % — SIGNIFICANT CHANGE UP (ref 0–6)
FIBRINOGEN PPP-MCNC: 572 MG/DL — HIGH (ref 200–465)
GLUCOSE SERPL-MCNC: 109 MG/DL — HIGH (ref 70–99)
HCT VFR BLD CALC: 40.6 % — SIGNIFICANT CHANGE UP (ref 34.5–45)
HGB BLD-MCNC: 13.3 G/DL — SIGNIFICANT CHANGE UP (ref 11.5–15.5)
IANC: 6.04 K/UL — SIGNIFICANT CHANGE UP (ref 1.8–7.4)
IMM GRANULOCYTES NFR BLD AUTO: 1.1 % — HIGH (ref 0–0.9)
INR BLD: 0.92 RATIO — SIGNIFICANT CHANGE UP (ref 0.88–1.16)
LDH SERPL L TO P-CCNC: 371 U/L — HIGH (ref 135–225)
LYMPHOCYTES # BLD AUTO: 2.86 K/UL — SIGNIFICANT CHANGE UP (ref 1–3.3)
LYMPHOCYTES # BLD AUTO: 29.8 % — SIGNIFICANT CHANGE UP (ref 13–44)
MCHC RBC-ENTMCNC: 27.9 PG — SIGNIFICANT CHANGE UP (ref 27–34)
MCHC RBC-ENTMCNC: 32.8 GM/DL — SIGNIFICANT CHANGE UP (ref 32–36)
MCV RBC AUTO: 85.1 FL — SIGNIFICANT CHANGE UP (ref 80–100)
MONOCYTES # BLD AUTO: 0.48 K/UL — SIGNIFICANT CHANGE UP (ref 0–0.9)
MONOCYTES NFR BLD AUTO: 5 % — SIGNIFICANT CHANGE UP (ref 2–14)
NEUTROPHILS # BLD AUTO: 6.04 K/UL — SIGNIFICANT CHANGE UP (ref 1.8–7.4)
NEUTROPHILS NFR BLD AUTO: 63 % — SIGNIFICANT CHANGE UP (ref 43–77)
NRBC # BLD: 0 /100 WBCS — SIGNIFICANT CHANGE UP (ref 0–0)
NRBC # FLD: 0.03 K/UL — HIGH (ref 0–0)
PLATELET # BLD AUTO: 205 K/UL — SIGNIFICANT CHANGE UP (ref 150–400)
POTASSIUM SERPL-MCNC: 4.1 MMOL/L — SIGNIFICANT CHANGE UP (ref 3.5–5.3)
POTASSIUM SERPL-SCNC: 4.1 MMOL/L — SIGNIFICANT CHANGE UP (ref 3.5–5.3)
PROT SERPL-MCNC: 7.5 G/DL — SIGNIFICANT CHANGE UP (ref 6–8.3)
PROTHROM AB SERPL-ACNC: 10.7 SEC — SIGNIFICANT CHANGE UP (ref 10.5–13.4)
RBC # BLD: 4.77 M/UL — SIGNIFICANT CHANGE UP (ref 3.8–5.2)
RBC # FLD: 15.6 % — HIGH (ref 10.3–14.5)
RH IG SCN BLD-IMP: POSITIVE — SIGNIFICANT CHANGE UP
SODIUM SERPL-SCNC: 136 MMOL/L — SIGNIFICANT CHANGE UP (ref 135–145)
URATE SERPL-MCNC: 6.1 MG/DL — SIGNIFICANT CHANGE UP (ref 2.5–7)
WBC # BLD: 9.6 K/UL — SIGNIFICANT CHANGE UP (ref 3.8–10.5)
WBC # FLD AUTO: 9.6 K/UL — SIGNIFICANT CHANGE UP (ref 3.8–10.5)

## 2022-11-18 PROCEDURE — 76819 FETAL BIOPHYS PROFIL W/O NST: CPT | Mod: 26

## 2022-11-18 PROCEDURE — 86077 PHYS BLOOD BANK SERV XMATCH: CPT

## 2022-11-18 PROCEDURE — 99233 SBSQ HOSP IP/OBS HIGH 50: CPT | Mod: GC

## 2022-11-18 PROCEDURE — 76820 UMBILICAL ARTERY ECHO: CPT | Mod: 26

## 2022-11-18 RX ADMIN — Medication 1 MILLIGRAM(S): at 14:14

## 2022-11-18 RX ADMIN — HEPARIN SODIUM 5000 UNIT(S): 5000 INJECTION INTRAVENOUS; SUBCUTANEOUS at 05:56

## 2022-11-18 RX ADMIN — HEPARIN SODIUM 5000 UNIT(S): 5000 INJECTION INTRAVENOUS; SUBCUTANEOUS at 18:45

## 2022-11-18 RX ADMIN — Medication 60 MILLIGRAM(S): at 05:56

## 2022-11-18 RX ADMIN — Medication 30 MILLIGRAM(S): at 14:15

## 2022-11-18 RX ADMIN — Medication 325 MILLIGRAM(S): at 14:14

## 2022-11-18 RX ADMIN — Medication 1 TABLET(S): at 14:13

## 2022-11-18 NOTE — DIETITIAN INITIAL EVALUATION ADULT - ORAL INTAKE PTA/DIET HISTORY
Pt lives at home with her . They cook together at home. No difficulty obtaining groceries. Pt follows a pescatarian diet PTA, pt accepts eggs and dairy as well. Was taking PTA and continues on in house, prenatal vitamins.

## 2022-11-18 NOTE — DIETITIAN INITIAL EVALUATION ADULT - OTHER INFO
Per chart, 33 y/o female  at 33w admitted with sPEC s/p Mg and mild headache now resolved. Patient with no acute complaints overnight, BPs wnl. Patient requiring IV replacement and initially declined, now amenable.    Nutrition interview: No recent episodes of nausea, vomiting, diarrhea or constipation, BM noted yesterday per Pt. Denies any chewing/swallowing difficulties. No food allergies. Stated prepregnancy wt: 155#, Pt has gained 29# since the beginning of her pregnancy. Current wt (11/10): 184.1#.  Wt gain is adequate for pregnancy given pt prepregnancy BMI: 27.4 (overweight). Food preferences explored and noted. Intake is % per pt. Feeding skills: independent. Pt eats some facility foods, however mostly eats food brought by family (present at bedside during visit today). Pt reports great appetite.     RD provided diet education regarding pre-eclampsia (verbal and written). RD and Pt went over importance of eating lean protein, incorporating fruits and vegetables, whole grains/fiber, and limiting consumption of caffeine, alcohol and sodium. RD also provided verbal diet education regarding breakfast feeding and increasing calories 350-400 kcal from what she was previously consuming prior to pregnancy to help with milk production. Pt confirmed understanding and compliance.

## 2022-11-18 NOTE — PROGRESS NOTE ADULT - SUBJECTIVE AND OBJECTIVE BOX
R3 Antepartum Progress Note   HD    Patient seen and examined at bedside, no acute overnight events. No acute complaints. Patient endorses good fetal movement, denies any loss of fluid, contractions. Patient does not want to have replace IV and it has been attempted. She states "I understand but also I don't understand. I'm being induced in one week so it would have to be replaced again anyway." Patient is ambulating and tolerating regular diet. Denies CP, SOB, HA, N/V, fevers, chills, or any other concerns.    Vital Signs Last 24 Hours  T(C): 37.1 (11-18-22 @ 05:45), Max: 37.1 (11-17-22 @ 20:49)  HR: 69 (11-18-22 @ 05:45) (61 - 69)  BP: 129/85 (11-18-22 @ 05:45) (118/73 - 137/77)  RR: 18 (11-18-22 @ 05:45) (16 - 18)  SpO2: 100% (11-18-22 @ 05:45) (98% - 100%)    I&O's Summary    17 Nov 2022 07:01  -  18 Nov 2022 07:00  --------------------------------------------------------  IN: 0 mL / OUT: 1100 mL / NET: -1100 mL        Physical Exam:  General: NAD  CV: RR  Lungs: breathing comfortably on RA  Abdomen: soft, gravid, non-tender  Ext: no pain or swelling    Labs:             11.8   9.93  )-----------( 191      ( 11-15 @ 05:26 )             36.6                12.3   10.78<H> )-----------( 210      ( 11-14 @ 12:49 )             37.6                11.5   10.42 )-----------( 202      ( 11-13 @ 18:07 )             34.6         MEDICATIONS  (STANDING):  ferrous    sulfate 325 milliGRAM(s) Oral daily  folic acid 1 milliGRAM(s) Oral daily  heparin   Injectable 5000 Unit(s) SubCutaneous every 12 hours  influenza   Vaccine 0.5 milliLiter(s) IntraMuscular once  NIFEdipine XL 60 milliGRAM(s) Oral daily  NIFEdipine XL 30 milliGRAM(s) Oral <User Schedule>  prenatal multivitamin 1 Tablet(s) Oral daily    MEDICATIONS  (PRN):  senna 2 Tablet(s) Oral daily PRN Constipation

## 2022-11-18 NOTE — DIETITIAN INITIAL EVALUATION ADULT - PERTINENT MEDS FT
MEDICATIONS  (STANDING):  ferrous    sulfate 325 milliGRAM(s) Oral daily  folic acid 1 milliGRAM(s) Oral daily  heparin   Injectable 5000 Unit(s) SubCutaneous every 12 hours  influenza   Vaccine 0.5 milliLiter(s) IntraMuscular once  NIFEdipine XL 60 milliGRAM(s) Oral daily  NIFEdipine XL 30 milliGRAM(s) Oral <User Schedule>  prenatal multivitamin 1 Tablet(s) Oral daily    MEDICATIONS  (PRN):  senna 2 Tablet(s) Oral daily PRN Constipation

## 2022-11-19 LAB
BLD GP AB SCN SERPL QL: POSITIVE — SIGNIFICANT CHANGE UP
RH IG SCN BLD-IMP: POSITIVE — SIGNIFICANT CHANGE UP

## 2022-11-19 PROCEDURE — 99232 SBSQ HOSP IP/OBS MODERATE 35: CPT | Mod: GC

## 2022-11-19 RX ADMIN — Medication 325 MILLIGRAM(S): at 13:57

## 2022-11-19 RX ADMIN — Medication 60 MILLIGRAM(S): at 05:36

## 2022-11-19 RX ADMIN — Medication 1 MILLIGRAM(S): at 13:57

## 2022-11-19 RX ADMIN — HEPARIN SODIUM 5000 UNIT(S): 5000 INJECTION INTRAVENOUS; SUBCUTANEOUS at 05:37

## 2022-11-19 RX ADMIN — Medication 30 MILLIGRAM(S): at 14:00

## 2022-11-19 RX ADMIN — HEPARIN SODIUM 5000 UNIT(S): 5000 INJECTION INTRAVENOUS; SUBCUTANEOUS at 17:09

## 2022-11-19 RX ADMIN — Medication 1 TABLET(S): at 13:57

## 2022-11-19 NOTE — PROGRESS NOTE ADULT - SUBJECTIVE AND OBJECTIVE BOX
R3 Antepartum Note, HD#10    Patient seen and examined at bedside, no acute overnight events. No acute complaints. Pt reports +FM, denies LOF, VB, ctx, HA, epigastric pain, blurred vision, CP, SOB, N/V, fevers, and chills.    Vital Signs Last 24 Hours  T(C): 36.7 (11-19-22 @ 05:34), Max: 37.2 (11-18-22 @ 14:04)  HR: 65 (11-19-22 @ 05:34) (64 - 81)  BP: 121/67 (11-19-22 @ 05:34) (121/67 - 140/89)  RR: 18 (11-19-22 @ 05:34) (16 - 18)  SpO2: 100% (11-19-22 @ 05:34) (100% - 100%)    CAPILLARY BLOOD GLUCOSE      Physical Exam:  General: NAD  Abdomen: Soft, non-tender, gravid  Ext: No pain or swelling    NST reactive overnight    Labs:             13.3   9.60  )-----------( 205      ( 11-18 @ 16:45 )             40.6     11-18 @ 16:45    136  |  102  |  6   ----------------------------<  109  4.1   |  19  |  0.78    Ca    9.2      11-18 @ 16:45    TPro  7.5  /  Alb  3.6  /  TBili  0.2  /  DBili  x   /  AST  29  /  ALT  20  /  AlkPhos  125  11-18 @ 16:45    PT/INR - ( 11-18 @ 16:45 )   PT: 10.7 sec;   INR: 0.92 ratio      Uric Acid: (11-18 @ 16:45)  6.1      Fibrinogen: (11-18 @ 16:45)  --       LDH: (11-18 @ 16:45)  371        MEDICATIONS  (STANDING):  ferrous    sulfate 325 milliGRAM(s) Oral daily  folic acid 1 milliGRAM(s) Oral daily  heparin   Injectable 5000 Unit(s) SubCutaneous every 12 hours  influenza   Vaccine 0.5 milliLiter(s) IntraMuscular once  NIFEdipine XL 60 milliGRAM(s) Oral daily  NIFEdipine XL 30 milliGRAM(s) Oral <User Schedule>  prenatal multivitamin 1 Tablet(s) Oral daily    MEDICATIONS  (PRN):  senna 2 Tablet(s) Oral daily PRN Constipation

## 2022-11-20 PROCEDURE — 99232 SBSQ HOSP IP/OBS MODERATE 35: CPT | Mod: GC

## 2022-11-20 RX ADMIN — Medication 325 MILLIGRAM(S): at 10:57

## 2022-11-20 RX ADMIN — HEPARIN SODIUM 5000 UNIT(S): 5000 INJECTION INTRAVENOUS; SUBCUTANEOUS at 05:46

## 2022-11-20 RX ADMIN — HEPARIN SODIUM 5000 UNIT(S): 5000 INJECTION INTRAVENOUS; SUBCUTANEOUS at 17:17

## 2022-11-20 RX ADMIN — Medication 30 MILLIGRAM(S): at 14:42

## 2022-11-20 RX ADMIN — Medication 60 MILLIGRAM(S): at 05:46

## 2022-11-20 RX ADMIN — Medication 1 TABLET(S): at 10:57

## 2022-11-20 RX ADMIN — Medication 1 MILLIGRAM(S): at 10:57

## 2022-11-20 NOTE — PROGRESS NOTE ADULT - SUBJECTIVE AND OBJECTIVE BOX
R3 Antepartum Note, HD#11    Interval events: Patient seen and examined at bedside, no acute overnight events. No acute complaints. Pt reports +FM, denies LOF, VB, ctx, HA, epigastric pain, blurred vision, CP, SOB, N/V, fevers, and chills.    Vital Signs Last 24 Hours  T(C): 36.9 (11-20-22 @ 01:29), Max: 36.9 (11-19-22 @ 18:24)  HR: 64 (11-20-22 @ 01:29) (64 - 70)  BP: 126/71 (11-20-22 @ 01:29) (121/67 - 140/85)  RR: 18 (11-20-22 @ 01:29) (16 - 18)  SpO2: 99% (11-20-22 @ 01:29) (99% - 100%)    CAPILLARY BLOOD GLUCOSE  Physical Exam:  General: NAD  Abdomen: Soft, non-tender, gravid  Ext: No pain or swelling    NST reactive overnight    Labs:             13.3   9.60  )-----------( 205      ( 11-18 @ 16:45 )             40.6     11-18 @ 16:45    136  |  102  |  6   ----------------------------<  109  4.1   |  19  |  0.78    Ca    9.2      11-18 @ 16:45    TPro  7.5  /  Alb  3.6  /  TBili  0.2  /  DBili  x   /  AST  29  /  ALT  20  /  AlkPhos  125  11-18 @ 16:45    PT/INR - ( 11-18 @ 16:45 )   PT: 10.7 sec;   INR: 0.92 ratio      Uric Acid: (11-18 @ 16:45)  6.1      Fibrinogen: (11-18 @ 16:45)  --       LDH: (11-18 @ 16:45)  371        MEDICATIONS  (STANDING):  ferrous    sulfate 325 milliGRAM(s) Oral daily  folic acid 1 milliGRAM(s) Oral daily  heparin   Injectable 5000 Unit(s) SubCutaneous every 12 hours  influenza   Vaccine 0.5 milliLiter(s) IntraMuscular once  NIFEdipine XL 60 milliGRAM(s) Oral daily  NIFEdipine XL 30 milliGRAM(s) Oral <User Schedule>  prenatal multivitamin 1 Tablet(s) Oral daily    MEDICATIONS  (PRN):  senna 2 Tablet(s) Oral daily PRN Constipation

## 2022-11-21 LAB
ALBUMIN SERPL ELPH-MCNC: 3.5 G/DL — SIGNIFICANT CHANGE UP (ref 3.3–5)
ALP SERPL-CCNC: 125 U/L — HIGH (ref 40–120)
ALT FLD-CCNC: 22 U/L — SIGNIFICANT CHANGE UP (ref 4–33)
ANION GAP SERPL CALC-SCNC: 14 MMOL/L — SIGNIFICANT CHANGE UP (ref 7–14)
APTT BLD: 36.3 SEC — SIGNIFICANT CHANGE UP (ref 27–36.3)
AST SERPL-CCNC: 28 U/L — SIGNIFICANT CHANGE UP (ref 4–32)
BASOPHILS # BLD AUTO: 0.03 K/UL — SIGNIFICANT CHANGE UP (ref 0–0.2)
BASOPHILS NFR BLD AUTO: 0.4 % — SIGNIFICANT CHANGE UP (ref 0–2)
BILIRUB SERPL-MCNC: <0.2 MG/DL — SIGNIFICANT CHANGE UP (ref 0.2–1.2)
BLD GP AB SCN SERPL QL: POSITIVE — SIGNIFICANT CHANGE UP
BUN SERPL-MCNC: 8 MG/DL — SIGNIFICANT CHANGE UP (ref 7–23)
CALCIUM SERPL-MCNC: 9.3 MG/DL — SIGNIFICANT CHANGE UP (ref 8.4–10.5)
CHLORIDE SERPL-SCNC: 104 MMOL/L — SIGNIFICANT CHANGE UP (ref 98–107)
CO2 SERPL-SCNC: 17 MMOL/L — LOW (ref 22–31)
CREAT SERPL-MCNC: 0.84 MG/DL — SIGNIFICANT CHANGE UP (ref 0.5–1.3)
EGFR: 93 ML/MIN/1.73M2 — SIGNIFICANT CHANGE UP
EOSINOPHIL # BLD AUTO: 0.06 K/UL — SIGNIFICANT CHANGE UP (ref 0–0.5)
EOSINOPHIL NFR BLD AUTO: 0.7 % — SIGNIFICANT CHANGE UP (ref 0–6)
FIBRINOGEN PPP-MCNC: 572 MG/DL — HIGH (ref 200–465)
GLUCOSE SERPL-MCNC: 76 MG/DL — SIGNIFICANT CHANGE UP (ref 70–99)
HCT VFR BLD CALC: 40.2 % — SIGNIFICANT CHANGE UP (ref 34.5–45)
HGB BLD-MCNC: 13.4 G/DL — SIGNIFICANT CHANGE UP (ref 11.5–15.5)
IANC: 5.14 K/UL — SIGNIFICANT CHANGE UP (ref 1.8–7.4)
IMM GRANULOCYTES NFR BLD AUTO: 0.5 % — SIGNIFICANT CHANGE UP (ref 0–0.9)
INR BLD: 0.93 RATIO — SIGNIFICANT CHANGE UP (ref 0.88–1.16)
LDH SERPL L TO P-CCNC: 317 U/L — HIGH (ref 135–225)
LYMPHOCYTES # BLD AUTO: 2.65 K/UL — SIGNIFICANT CHANGE UP (ref 1–3.3)
LYMPHOCYTES # BLD AUTO: 31.4 % — SIGNIFICANT CHANGE UP (ref 13–44)
MCHC RBC-ENTMCNC: 28.5 PG — SIGNIFICANT CHANGE UP (ref 27–34)
MCHC RBC-ENTMCNC: 33.3 GM/DL — SIGNIFICANT CHANGE UP (ref 32–36)
MCV RBC AUTO: 85.5 FL — SIGNIFICANT CHANGE UP (ref 80–100)
MONOCYTES # BLD AUTO: 0.51 K/UL — SIGNIFICANT CHANGE UP (ref 0–0.9)
MONOCYTES NFR BLD AUTO: 6 % — SIGNIFICANT CHANGE UP (ref 2–14)
NEUTROPHILS # BLD AUTO: 5.14 K/UL — SIGNIFICANT CHANGE UP (ref 1.8–7.4)
NEUTROPHILS NFR BLD AUTO: 61 % — SIGNIFICANT CHANGE UP (ref 43–77)
NRBC # BLD: 0 /100 WBCS — SIGNIFICANT CHANGE UP (ref 0–0)
NRBC # FLD: 0 K/UL — SIGNIFICANT CHANGE UP (ref 0–0)
PLATELET # BLD AUTO: 187 K/UL — SIGNIFICANT CHANGE UP (ref 150–400)
POTASSIUM SERPL-MCNC: 4.5 MMOL/L — SIGNIFICANT CHANGE UP (ref 3.5–5.3)
POTASSIUM SERPL-SCNC: 4.5 MMOL/L — SIGNIFICANT CHANGE UP (ref 3.5–5.3)
PROT SERPL-MCNC: 7 G/DL — SIGNIFICANT CHANGE UP (ref 6–8.3)
PROTHROM AB SERPL-ACNC: 10.8 SEC — SIGNIFICANT CHANGE UP (ref 10.5–13.4)
RBC # BLD: 4.7 M/UL — SIGNIFICANT CHANGE UP (ref 3.8–5.2)
RBC # FLD: 15.9 % — HIGH (ref 10.3–14.5)
RH IG SCN BLD-IMP: POSITIVE — SIGNIFICANT CHANGE UP
SODIUM SERPL-SCNC: 135 MMOL/L — SIGNIFICANT CHANGE UP (ref 135–145)
URATE SERPL-MCNC: 6.8 MG/DL — SIGNIFICANT CHANGE UP (ref 2.5–7)
WBC # BLD: 8.43 K/UL — SIGNIFICANT CHANGE UP (ref 3.8–10.5)
WBC # FLD AUTO: 8.43 K/UL — SIGNIFICANT CHANGE UP (ref 3.8–10.5)

## 2022-11-21 PROCEDURE — 99233 SBSQ HOSP IP/OBS HIGH 50: CPT | Mod: GC

## 2022-11-21 RX ADMIN — HEPARIN SODIUM 5000 UNIT(S): 5000 INJECTION INTRAVENOUS; SUBCUTANEOUS at 06:24

## 2022-11-21 RX ADMIN — HEPARIN SODIUM 5000 UNIT(S): 5000 INJECTION INTRAVENOUS; SUBCUTANEOUS at 18:31

## 2022-11-21 RX ADMIN — Medication 1 MILLIGRAM(S): at 10:35

## 2022-11-21 RX ADMIN — Medication 60 MILLIGRAM(S): at 06:24

## 2022-11-21 RX ADMIN — Medication 325 MILLIGRAM(S): at 10:36

## 2022-11-21 RX ADMIN — Medication 30 MILLIGRAM(S): at 15:05

## 2022-11-21 RX ADMIN — Medication 1 TABLET(S): at 10:35

## 2022-11-21 NOTE — PROGRESS NOTE ADULT - SUBJECTIVE AND OBJECTIVE BOX
R3 Antepartum Note,       Patient seen and examined at bedside, no acute overnight events. No acute complaints. Pt reports +FM, denies LOF, VB, ctx, HA, epigastric pain, blurred vision, CP, SOB, N/V, fevers, and chills.    Vital Signs Last 24 Hours  T(C): 37.1 (11-21-22 @ 06:19), Max: 37.2 (11-20-22 @ 22:14)  HR: 60 (11-21-22 @ 06:19) (60 - 85)  BP: 130/75 (11-21-22 @ 06:19) (110/88 - 155/75)  RR: 18 (11-21-22 @ 06:19) (16 - 19)  SpO2: 100% (11-21-22 @ 06:19) (97% - 100%)    CAPILLARY BLOOD GLUCOSE          Physical Exam:  General: NAD  Abdomen: Soft, non-tender, gravid  Ext: No pain or swelling    Labs:        PT/INR - ( 11-18 @ 16:45 )   PT: 10.7 sec;   INR: 0.92 ratio      Uric Acid: (11-18 @ 16:45)  6.1      Fibrinogen: (11-18 @ 16:45)  --       LDH: (11-18 @ 16:45)  371        MEDICATIONS  (STANDING):  ferrous    sulfate 325 milliGRAM(s) Oral daily  folic acid 1 milliGRAM(s) Oral daily  heparin   Injectable 5000 Unit(s) SubCutaneous every 12 hours  influenza   Vaccine 0.5 milliLiter(s) IntraMuscular once  NIFEdipine XL 60 milliGRAM(s) Oral daily  NIFEdipine XL 30 milliGRAM(s) Oral <User Schedule>  prenatal multivitamin 1 Tablet(s) Oral daily    MEDICATIONS  (PRN):  senna 2 Tablet(s) Oral daily PRN Constipation

## 2022-11-22 ENCOUNTER — APPOINTMENT (OUTPATIENT)
Dept: ANTEPARTUM | Facility: HOSPITAL | Age: 32
End: 2022-11-22

## 2022-11-22 ENCOUNTER — ASOB RESULT (OUTPATIENT)
Age: 32
End: 2022-11-22

## 2022-11-22 LAB
ALBUMIN SERPL ELPH-MCNC: 3.6 G/DL — SIGNIFICANT CHANGE UP (ref 3.3–5)
ALP SERPL-CCNC: 134 U/L — HIGH (ref 40–120)
ALT FLD-CCNC: 25 U/L — SIGNIFICANT CHANGE UP (ref 4–33)
ANION GAP SERPL CALC-SCNC: 12 MMOL/L — SIGNIFICANT CHANGE UP (ref 7–14)
APTT BLD: 34.4 SEC — SIGNIFICANT CHANGE UP (ref 27–36.3)
AST SERPL-CCNC: 32 U/L — SIGNIFICANT CHANGE UP (ref 4–32)
BASOPHILS # BLD AUTO: 0.03 K/UL — SIGNIFICANT CHANGE UP (ref 0–0.2)
BASOPHILS NFR BLD AUTO: 0.3 % — SIGNIFICANT CHANGE UP (ref 0–2)
BILIRUB SERPL-MCNC: <0.2 MG/DL — SIGNIFICANT CHANGE UP (ref 0.2–1.2)
BUN SERPL-MCNC: 5 MG/DL — LOW (ref 7–23)
CALCIUM SERPL-MCNC: 9.6 MG/DL — SIGNIFICANT CHANGE UP (ref 8.4–10.5)
CHLORIDE SERPL-SCNC: 104 MMOL/L — SIGNIFICANT CHANGE UP (ref 98–107)
CO2 SERPL-SCNC: 18 MMOL/L — LOW (ref 22–31)
CREAT SERPL-MCNC: 0.82 MG/DL — SIGNIFICANT CHANGE UP (ref 0.5–1.3)
EGFR: 96 ML/MIN/1.73M2 — SIGNIFICANT CHANGE UP
EOSINOPHIL # BLD AUTO: 0.07 K/UL — SIGNIFICANT CHANGE UP (ref 0–0.5)
EOSINOPHIL NFR BLD AUTO: 0.8 % — SIGNIFICANT CHANGE UP (ref 0–6)
FIBRINOGEN PPP-MCNC: 654 MG/DL — HIGH (ref 200–465)
GLUCOSE SERPL-MCNC: 100 MG/DL — HIGH (ref 70–99)
HCT VFR BLD CALC: 40.1 % — SIGNIFICANT CHANGE UP (ref 34.5–45)
HGB BLD-MCNC: 13.2 G/DL — SIGNIFICANT CHANGE UP (ref 11.5–15.5)
IANC: 5.82 K/UL — SIGNIFICANT CHANGE UP (ref 1.8–7.4)
IMM GRANULOCYTES NFR BLD AUTO: 0.8 % — SIGNIFICANT CHANGE UP (ref 0–0.9)
INR BLD: 0.96 RATIO — SIGNIFICANT CHANGE UP (ref 0.88–1.16)
LDH SERPL L TO P-CCNC: 288 U/L — HIGH (ref 135–225)
LYMPHOCYTES # BLD AUTO: 2.36 K/UL — SIGNIFICANT CHANGE UP (ref 1–3.3)
LYMPHOCYTES # BLD AUTO: 26.9 % — SIGNIFICANT CHANGE UP (ref 13–44)
MCHC RBC-ENTMCNC: 28.2 PG — SIGNIFICANT CHANGE UP (ref 27–34)
MCHC RBC-ENTMCNC: 32.9 GM/DL — SIGNIFICANT CHANGE UP (ref 32–36)
MCV RBC AUTO: 85.7 FL — SIGNIFICANT CHANGE UP (ref 80–100)
MONOCYTES # BLD AUTO: 0.43 K/UL — SIGNIFICANT CHANGE UP (ref 0–0.9)
MONOCYTES NFR BLD AUTO: 4.9 % — SIGNIFICANT CHANGE UP (ref 2–14)
NEUTROPHILS # BLD AUTO: 5.82 K/UL — SIGNIFICANT CHANGE UP (ref 1.8–7.4)
NEUTROPHILS NFR BLD AUTO: 66.3 % — SIGNIFICANT CHANGE UP (ref 43–77)
NRBC # BLD: 0 /100 WBCS — SIGNIFICANT CHANGE UP (ref 0–0)
NRBC # FLD: 0 K/UL — SIGNIFICANT CHANGE UP (ref 0–0)
PLATELET # BLD AUTO: 171 K/UL — SIGNIFICANT CHANGE UP (ref 150–400)
POTASSIUM SERPL-MCNC: 4.1 MMOL/L — SIGNIFICANT CHANGE UP (ref 3.5–5.3)
POTASSIUM SERPL-SCNC: 4.1 MMOL/L — SIGNIFICANT CHANGE UP (ref 3.5–5.3)
PROT SERPL-MCNC: 7.1 G/DL — SIGNIFICANT CHANGE UP (ref 6–8.3)
PROTHROM AB SERPL-ACNC: 11.1 SEC — SIGNIFICANT CHANGE UP (ref 10.5–13.4)
RBC # BLD: 4.68 M/UL — SIGNIFICANT CHANGE UP (ref 3.8–5.2)
RBC # FLD: 16 % — HIGH (ref 10.3–14.5)
SODIUM SERPL-SCNC: 134 MMOL/L — LOW (ref 135–145)
URATE SERPL-MCNC: 6.2 MG/DL — SIGNIFICANT CHANGE UP (ref 2.5–7)
WBC # BLD: 8.78 K/UL — SIGNIFICANT CHANGE UP (ref 3.8–10.5)
WBC # FLD AUTO: 8.78 K/UL — SIGNIFICANT CHANGE UP (ref 3.8–10.5)

## 2022-11-22 PROCEDURE — 76820 UMBILICAL ARTERY ECHO: CPT | Mod: 26

## 2022-11-22 PROCEDURE — 76819 FETAL BIOPHYS PROFIL W/O NST: CPT | Mod: 26

## 2022-11-22 PROCEDURE — 99231 SBSQ HOSP IP/OBS SF/LOW 25: CPT

## 2022-11-22 RX ADMIN — Medication 1 MILLIGRAM(S): at 11:01

## 2022-11-22 RX ADMIN — Medication 60 MILLIGRAM(S): at 06:33

## 2022-11-22 RX ADMIN — Medication 1 TABLET(S): at 11:01

## 2022-11-22 RX ADMIN — HEPARIN SODIUM 5000 UNIT(S): 5000 INJECTION INTRAVENOUS; SUBCUTANEOUS at 06:33

## 2022-11-22 RX ADMIN — Medication 325 MILLIGRAM(S): at 11:01

## 2022-11-22 RX ADMIN — Medication 30 MILLIGRAM(S): at 15:16

## 2022-11-22 RX ADMIN — HEPARIN SODIUM 5000 UNIT(S): 5000 INJECTION INTRAVENOUS; SUBCUTANEOUS at 18:23

## 2022-11-22 RX ADMIN — SENNA PLUS 2 TABLET(S): 8.6 TABLET ORAL at 11:01

## 2022-11-22 NOTE — PROGRESS NOTE ADULT - SUBJECTIVE AND OBJECTIVE BOX
R3 Antepartum Note,       Patient seen and examined at bedside, no acute overnight events. No acute complaints. Pt reports +FM, denies LOF, VB, ctx, HA, epigastric pain, blurred vision, CP, SOB, N/V, fevers, and chills.    Vital Signs Last 24 Hours  T(C): 36.9 (11-22-22 @ 05:38), Max: 37.2 (11-22-22 @ 01:44)  HR: 59 (11-22-22 @ 05:38) (59 - 72)  BP: 120/67 (11-22-22 @ 05:38) (116/70 - 136/90)  RR: 17 (11-22-22 @ 05:38) (16 - 17)  SpO2: 100% (11-22-22 @ 05:38) (99% - 100%)    CAPILLARY BLOOD GLUCOSE          Physical Exam:  General: NAD  Abdomen: Soft, non-tender, gravid  Ext: No pain or swelling    Labs:             13.4   8.43  )-----------( 187      ( 11-21 @ 06:32 )             40.2     11-21 @ 06:32    135  |  104  |  8   ----------------------------<  76  4.5   |  17  |  0.84    Ca    9.3      11-21 @ 06:32    TPro  7.0  /  Alb  3.5  /  TBili  <0.2  /  DBili  x   /  AST  28  /  ALT  22  /  AlkPhos  125  11-21 @ 06:32    PT/INR - ( 11-21 @ 06:32 )   PT: 10.8 sec;   INR: 0.93 ratio    PTT - ( 11-21 @ 06:32 )  PTT:36.3 sec    Uric Acid: (11-21 @ 06:32)  6.8      Fibrinogen: (11-21 @ 06:32)  --       LDH: (11-21 @ 06:32)  317        MEDICATIONS  (STANDING):  ferrous    sulfate 325 milliGRAM(s) Oral daily  folic acid 1 milliGRAM(s) Oral daily  heparin   Injectable 5000 Unit(s) SubCutaneous every 12 hours  NIFEdipine XL 60 milliGRAM(s) Oral daily  NIFEdipine XL 30 milliGRAM(s) Oral <User Schedule>  prenatal multivitamin 1 Tablet(s) Oral daily    MEDICATIONS  (PRN):  senna 2 Tablet(s) Oral daily PRN Constipation

## 2022-11-23 LAB
ALBUMIN SERPL ELPH-MCNC: 3.1 G/DL — LOW (ref 3.3–5)
ALP SERPL-CCNC: 122 U/L — HIGH (ref 40–120)
ALT FLD-CCNC: 15 U/L — SIGNIFICANT CHANGE UP (ref 4–33)
ANION GAP SERPL CALC-SCNC: 12 MMOL/L — SIGNIFICANT CHANGE UP (ref 7–14)
APTT BLD: 42.5 SEC — HIGH (ref 27–36.3)
AST SERPL-CCNC: 24 U/L — SIGNIFICANT CHANGE UP (ref 4–32)
BASOPHILS # BLD AUTO: 0.02 K/UL — SIGNIFICANT CHANGE UP (ref 0–0.2)
BASOPHILS NFR BLD AUTO: 0.3 % — SIGNIFICANT CHANGE UP (ref 0–2)
BILIRUB SERPL-MCNC: <0.2 MG/DL — SIGNIFICANT CHANGE UP (ref 0.2–1.2)
BUN SERPL-MCNC: 6 MG/DL — LOW (ref 7–23)
CALCIUM SERPL-MCNC: 9 MG/DL — SIGNIFICANT CHANGE UP (ref 8.4–10.5)
CHLORIDE SERPL-SCNC: 106 MMOL/L — SIGNIFICANT CHANGE UP (ref 98–107)
CO2 SERPL-SCNC: 18 MMOL/L — LOW (ref 22–31)
CREAT SERPL-MCNC: 0.81 MG/DL — SIGNIFICANT CHANGE UP (ref 0.5–1.3)
EGFR: 98 ML/MIN/1.73M2 — SIGNIFICANT CHANGE UP
EOSINOPHIL # BLD AUTO: 0.07 K/UL — SIGNIFICANT CHANGE UP (ref 0–0.5)
EOSINOPHIL NFR BLD AUTO: 1 % — SIGNIFICANT CHANGE UP (ref 0–6)
FIBRINOGEN PPP-MCNC: 532 MG/DL — HIGH (ref 200–465)
GLUCOSE SERPL-MCNC: 73 MG/DL — SIGNIFICANT CHANGE UP (ref 70–99)
HCT VFR BLD CALC: 36.1 % — SIGNIFICANT CHANGE UP (ref 34.5–45)
HGB BLD-MCNC: 11.9 G/DL — SIGNIFICANT CHANGE UP (ref 11.5–15.5)
IANC: 4.61 K/UL — SIGNIFICANT CHANGE UP (ref 1.8–7.4)
IMM GRANULOCYTES NFR BLD AUTO: 0.4 % — SIGNIFICANT CHANGE UP (ref 0–0.9)
INR BLD: 0.94 RATIO — SIGNIFICANT CHANGE UP (ref 0.88–1.16)
LDH SERPL L TO P-CCNC: 248 U/L — HIGH (ref 135–225)
LYMPHOCYTES # BLD AUTO: 2.26 K/UL — SIGNIFICANT CHANGE UP (ref 1–3.3)
LYMPHOCYTES # BLD AUTO: 30.8 % — SIGNIFICANT CHANGE UP (ref 13–44)
MCHC RBC-ENTMCNC: 28.2 PG — SIGNIFICANT CHANGE UP (ref 27–34)
MCHC RBC-ENTMCNC: 33 GM/DL — SIGNIFICANT CHANGE UP (ref 32–36)
MCV RBC AUTO: 85.5 FL — SIGNIFICANT CHANGE UP (ref 80–100)
MONOCYTES # BLD AUTO: 0.35 K/UL — SIGNIFICANT CHANGE UP (ref 0–0.9)
MONOCYTES NFR BLD AUTO: 4.8 % — SIGNIFICANT CHANGE UP (ref 2–14)
NEUTROPHILS # BLD AUTO: 4.61 K/UL — SIGNIFICANT CHANGE UP (ref 1.8–7.4)
NEUTROPHILS NFR BLD AUTO: 62.7 % — SIGNIFICANT CHANGE UP (ref 43–77)
NRBC # BLD: 0 /100 WBCS — SIGNIFICANT CHANGE UP (ref 0–0)
NRBC # FLD: 0 K/UL — SIGNIFICANT CHANGE UP (ref 0–0)
PLATELET # BLD AUTO: 157 K/UL — SIGNIFICANT CHANGE UP (ref 150–400)
POTASSIUM SERPL-MCNC: 4 MMOL/L — SIGNIFICANT CHANGE UP (ref 3.5–5.3)
POTASSIUM SERPL-SCNC: 4 MMOL/L — SIGNIFICANT CHANGE UP (ref 3.5–5.3)
PROT SERPL-MCNC: 6.5 G/DL — SIGNIFICANT CHANGE UP (ref 6–8.3)
PROTHROM AB SERPL-ACNC: 10.9 SEC — SIGNIFICANT CHANGE UP (ref 10.5–13.4)
RBC # BLD: 4.22 M/UL — SIGNIFICANT CHANGE UP (ref 3.8–5.2)
RBC # FLD: 15.9 % — HIGH (ref 10.3–14.5)
SODIUM SERPL-SCNC: 136 MMOL/L — SIGNIFICANT CHANGE UP (ref 135–145)
URATE SERPL-MCNC: 6.3 MG/DL — SIGNIFICANT CHANGE UP (ref 2.5–7)
WBC # BLD: 7.34 K/UL — SIGNIFICANT CHANGE UP (ref 3.8–10.5)
WBC # FLD AUTO: 7.34 K/UL — SIGNIFICANT CHANGE UP (ref 3.8–10.5)

## 2022-11-23 PROCEDURE — 99233 SBSQ HOSP IP/OBS HIGH 50: CPT

## 2022-11-23 RX ADMIN — Medication 325 MILLIGRAM(S): at 11:14

## 2022-11-23 RX ADMIN — Medication 1 TABLET(S): at 11:14

## 2022-11-23 RX ADMIN — HEPARIN SODIUM 5000 UNIT(S): 5000 INJECTION INTRAVENOUS; SUBCUTANEOUS at 18:04

## 2022-11-23 RX ADMIN — Medication 30 MILLIGRAM(S): at 15:25

## 2022-11-23 RX ADMIN — Medication 1 MILLIGRAM(S): at 11:14

## 2022-11-23 RX ADMIN — Medication 60 MILLIGRAM(S): at 06:01

## 2022-11-23 RX ADMIN — HEPARIN SODIUM 5000 UNIT(S): 5000 INJECTION INTRAVENOUS; SUBCUTANEOUS at 06:02

## 2022-11-23 RX ADMIN — SENNA PLUS 2 TABLET(S): 8.6 TABLET ORAL at 11:14

## 2022-11-23 NOTE — PROGRESS NOTE ADULT - SUBJECTIVE AND OBJECTIVE BOX
R3 Antepartum Note, HD#14    Interval events: Patient seen and examined at bedside, no acute overnight events. No acute complaints. Pt reports +FM, denies LOF, VB, ctx, HA, epigastric pain, blurred vision, CP, SOB, N/V, fevers, and chills.    Vital Signs Last 24 Hours  T(C): 37 (11-23-22 @ 05:59), Max: 37 (11-22-22 @ 18:38)  HR: 60 (11-23-22 @ 05:59) (60 - 69)  BP: 124/76 (11-23-22 @ 05:59) (116/69 - 138/93)  RR: 16 (11-23-22 @ 05:59) (14 - 18)  SpO2: 100% (11-23-22 @ 05:59) (100% - 100%)    CAPILLARY BLOOD GLUCOSE          Physical Exam:  General: NAD  Abdomen: Soft, non-tender, gravid  Ext: No pain or swelling    NST reactive overnight    Labs:             11.9   7.34  )-----------( 157      ( 11-23 @ 07:02 )             36.1     11-23 @ 07:02    136  |  106  |  6   ----------------------------<  73  4.0   |  18  |  0.81    Ca    9.0      11-23 @ 07:02    TPro  6.5  /  Alb  3.1  /  TBili  <0.2  /  DBili  x   /  AST  24  /  ALT  15  /  AlkPhos  122  11-23 @ 07:02    PT/INR - ( 11-23 @ 07:02 )   PT: 10.9 sec;   INR: 0.94 ratio    PTT - ( 11-23 @ 07:02 )  PTT:42.5 sec    Uric Acid: (11-23 @ 07:02)  6.3      Fibrinogen: (11-23 @ 07:02)  --       LDH: (11-23 @ 07:02)  248        MEDICATIONS  (STANDING):  ferrous    sulfate 325 milliGRAM(s) Oral daily  folic acid 1 milliGRAM(s) Oral daily  heparin   Injectable 5000 Unit(s) SubCutaneous every 12 hours  NIFEdipine XL 60 milliGRAM(s) Oral daily  NIFEdipine XL 30 milliGRAM(s) Oral <User Schedule>  prenatal multivitamin 1 Tablet(s) Oral daily    MEDICATIONS  (PRN):  senna 2 Tablet(s) Oral daily PRN Constipation

## 2022-11-24 LAB
ALBUMIN SERPL ELPH-MCNC: 3.2 G/DL — LOW (ref 3.3–5)
ALP SERPL-CCNC: 126 U/L — HIGH (ref 40–120)
ALT FLD-CCNC: 18 U/L — SIGNIFICANT CHANGE UP (ref 4–33)
ANION GAP SERPL CALC-SCNC: 11 MMOL/L — SIGNIFICANT CHANGE UP (ref 7–14)
APTT BLD: 41.2 SEC — HIGH (ref 27–36.3)
AST SERPL-CCNC: 24 U/L — SIGNIFICANT CHANGE UP (ref 4–32)
BASOPHILS # BLD AUTO: 0.02 K/UL — SIGNIFICANT CHANGE UP (ref 0–0.2)
BASOPHILS NFR BLD AUTO: 0.3 % — SIGNIFICANT CHANGE UP (ref 0–2)
BILIRUB SERPL-MCNC: 0.2 MG/DL — SIGNIFICANT CHANGE UP (ref 0.2–1.2)
BLD GP AB SCN SERPL QL: POSITIVE — SIGNIFICANT CHANGE UP
BUN SERPL-MCNC: 6 MG/DL — LOW (ref 7–23)
CALCIUM SERPL-MCNC: 8.8 MG/DL — SIGNIFICANT CHANGE UP (ref 8.4–10.5)
CHLORIDE SERPL-SCNC: 105 MMOL/L — SIGNIFICANT CHANGE UP (ref 98–107)
CO2 SERPL-SCNC: 18 MMOL/L — LOW (ref 22–31)
CREAT SERPL-MCNC: 0.81 MG/DL — SIGNIFICANT CHANGE UP (ref 0.5–1.3)
EGFR: 98 ML/MIN/1.73M2 — SIGNIFICANT CHANGE UP
EOSINOPHIL # BLD AUTO: 0.08 K/UL — SIGNIFICANT CHANGE UP (ref 0–0.5)
EOSINOPHIL NFR BLD AUTO: 1 % — SIGNIFICANT CHANGE UP (ref 0–6)
FIBRINOGEN PPP-MCNC: 643 MG/DL — HIGH (ref 200–465)
GLUCOSE SERPL-MCNC: 75 MG/DL — SIGNIFICANT CHANGE UP (ref 70–99)
HCT VFR BLD CALC: 36.7 % — SIGNIFICANT CHANGE UP (ref 34.5–45)
HGB BLD-MCNC: 12.2 G/DL — SIGNIFICANT CHANGE UP (ref 11.5–15.5)
IANC: 4.54 K/UL — SIGNIFICANT CHANGE UP (ref 1.8–7.4)
IMM GRANULOCYTES NFR BLD AUTO: 0.7 % — SIGNIFICANT CHANGE UP (ref 0–0.9)
INR BLD: 0.97 RATIO — SIGNIFICANT CHANGE UP (ref 0.88–1.16)
LDH SERPL L TO P-CCNC: 243 U/L — HIGH (ref 135–225)
LYMPHOCYTES # BLD AUTO: 2.57 K/UL — SIGNIFICANT CHANGE UP (ref 1–3.3)
LYMPHOCYTES # BLD AUTO: 33.6 % — SIGNIFICANT CHANGE UP (ref 13–44)
MCHC RBC-ENTMCNC: 28.1 PG — SIGNIFICANT CHANGE UP (ref 27–34)
MCHC RBC-ENTMCNC: 33.2 GM/DL — SIGNIFICANT CHANGE UP (ref 32–36)
MCV RBC AUTO: 84.6 FL — SIGNIFICANT CHANGE UP (ref 80–100)
MONOCYTES # BLD AUTO: 0.4 K/UL — SIGNIFICANT CHANGE UP (ref 0–0.9)
MONOCYTES NFR BLD AUTO: 5.2 % — SIGNIFICANT CHANGE UP (ref 2–14)
NEUTROPHILS # BLD AUTO: 4.54 K/UL — SIGNIFICANT CHANGE UP (ref 1.8–7.4)
NEUTROPHILS NFR BLD AUTO: 59.2 % — SIGNIFICANT CHANGE UP (ref 43–77)
NRBC # BLD: 0 /100 WBCS — SIGNIFICANT CHANGE UP (ref 0–0)
NRBC # FLD: 0 K/UL — SIGNIFICANT CHANGE UP (ref 0–0)
PLATELET # BLD AUTO: 173 K/UL — SIGNIFICANT CHANGE UP (ref 150–400)
POTASSIUM SERPL-MCNC: 4 MMOL/L — SIGNIFICANT CHANGE UP (ref 3.5–5.3)
POTASSIUM SERPL-SCNC: 4 MMOL/L — SIGNIFICANT CHANGE UP (ref 3.5–5.3)
PROT SERPL-MCNC: 6.6 G/DL — SIGNIFICANT CHANGE UP (ref 6–8.3)
PROTHROM AB SERPL-ACNC: 11.3 SEC — SIGNIFICANT CHANGE UP (ref 10.5–13.4)
RBC # BLD: 4.34 M/UL — SIGNIFICANT CHANGE UP (ref 3.8–5.2)
RBC # FLD: 16 % — HIGH (ref 10.3–14.5)
RH IG SCN BLD-IMP: POSITIVE — SIGNIFICANT CHANGE UP
SODIUM SERPL-SCNC: 134 MMOL/L — LOW (ref 135–145)
URATE SERPL-MCNC: 6.6 MG/DL — SIGNIFICANT CHANGE UP (ref 2.5–7)
WBC # BLD: 7.66 K/UL — SIGNIFICANT CHANGE UP (ref 3.8–10.5)
WBC # FLD AUTO: 7.66 K/UL — SIGNIFICANT CHANGE UP (ref 3.8–10.5)

## 2022-11-24 PROCEDURE — 99232 SBSQ HOSP IP/OBS MODERATE 35: CPT | Mod: GC

## 2022-11-24 RX ORDER — BENZOCAINE AND MENTHOL 5; 1 G/100ML; G/100ML
1 LIQUID ORAL EVERY 6 HOURS
Refills: 0 | Status: DISCONTINUED | OUTPATIENT
Start: 2022-11-24 | End: 2022-11-25

## 2022-11-24 RX ADMIN — Medication 325 MILLIGRAM(S): at 10:01

## 2022-11-24 RX ADMIN — Medication 60 MILLIGRAM(S): at 06:22

## 2022-11-24 RX ADMIN — BENZOCAINE AND MENTHOL 1 LOZENGE: 5; 1 LIQUID ORAL at 23:29

## 2022-11-24 RX ADMIN — HEPARIN SODIUM 5000 UNIT(S): 5000 INJECTION INTRAVENOUS; SUBCUTANEOUS at 06:22

## 2022-11-24 RX ADMIN — Medication 1 MILLIGRAM(S): at 10:01

## 2022-11-24 RX ADMIN — HEPARIN SODIUM 5000 UNIT(S): 5000 INJECTION INTRAVENOUS; SUBCUTANEOUS at 18:30

## 2022-11-24 RX ADMIN — Medication 1 TABLET(S): at 10:01

## 2022-11-24 RX ADMIN — Medication 30 MILLIGRAM(S): at 14:19

## 2022-11-24 NOTE — PROGRESS NOTE ADULT - SUBJECTIVE AND OBJECTIVE BOX
R3 Antepartum Note    HD#15    Patient seen and examined at bedside, no acute overnight events. No acute complaints. Endorses leakage of clear fluid occasionally when she voids. Pt reports +FM, denies VB, ctx, HA, epigastric pain, blurred vision, CP, SOB, N/V, fevers, and chills.    Vital Signs Last 24 Hours  T(C): 36.7 (11-24-22 @ 05:42), Max: 37.2 (11-23-22 @ 15:26)  HR: 67 (11-24-22 @ 05:42) (61 - 75)  BP: 115/62 (11-24-22 @ 05:42) (115/62 - 129/88)  RR: 18 (11-24-22 @ 05:42) (17 - 18)  SpO2: 100% (11-24-22 @ 05:42) (99% - 100%)    CAPILLARY BLOOD GLUCOSE          Physical Exam:  General: NAD  Abdomen: Soft, non-tender, gravid  Ext: No pain or swelling    NST reactive overnight    Labs:             12.2   7.66  )-----------( 173      ( 11-24 @ 06:00 )             36.7     11-24 @ 06:36    134  |  105  |  6   ----------------------------<  75  4.0   |  18  |  0.81    Ca    8.8      11-24 @ 06:36    TPro  6.6  /  Alb  3.2  /  TBili  0.2  /  DBili  x   /  AST  24  /  ALT  18  /  AlkPhos  126  11-24 @ 06:36    PT/INR - ( 11-24 @ 06:00 )   PT: 11.3 sec;   INR: 0.97 ratio    PTT - ( 11-24 @ 06:00 )  PTT:41.2 sec    Uric Acid: (11-24 @ 06:36)  6.6      Fibrinogen: (11-24 @ 06:36)  --       LDH: (11-24 @ 06:36)  243        MEDICATIONS  (STANDING):  ferrous    sulfate 325 milliGRAM(s) Oral daily  folic acid 1 milliGRAM(s) Oral daily  heparin   Injectable 5000 Unit(s) SubCutaneous every 12 hours  NIFEdipine XL 60 milliGRAM(s) Oral daily  NIFEdipine XL 30 milliGRAM(s) Oral <User Schedule>  prenatal multivitamin 1 Tablet(s) Oral daily    MEDICATIONS  (PRN):  senna 2 Tablet(s) Oral daily PRN Constipation   R3 Antepartum Note    HD#15    Patient seen and examined at bedside, no acute overnight events. No acute complaints. Pt reports +FM, denies LOF, VB, ctx, HA, epigastric pain, blurred vision, CP, SOB, N/V, fevers, and chills.    Vital Signs Last 24 Hours  T(C): 36.7 (11-24-22 @ 05:42), Max: 37.2 (11-23-22 @ 15:26)  HR: 67 (11-24-22 @ 05:42) (61 - 75)  BP: 115/62 (11-24-22 @ 05:42) (115/62 - 129/88)  RR: 18 (11-24-22 @ 05:42) (17 - 18)  SpO2: 100% (11-24-22 @ 05:42) (99% - 100%)    CAPILLARY BLOOD GLUCOSE          Physical Exam:  General: NAD  Abdomen: Soft, non-tender, gravid  Ext: No pain or swelling    NST reactive overnight    Labs:             12.2   7.66  )-----------( 173      ( 11-24 @ 06:00 )             36.7     11-24 @ 06:36    134  |  105  |  6   ----------------------------<  75  4.0   |  18  |  0.81    Ca    8.8      11-24 @ 06:36    TPro  6.6  /  Alb  3.2  /  TBili  0.2  /  DBili  x   /  AST  24  /  ALT  18  /  AlkPhos  126  11-24 @ 06:36    PT/INR - ( 11-24 @ 06:00 )   PT: 11.3 sec;   INR: 0.97 ratio    PTT - ( 11-24 @ 06:00 )  PTT:41.2 sec    Uric Acid: (11-24 @ 06:36)  6.6      Fibrinogen: (11-24 @ 06:36)  --       LDH: (11-24 @ 06:36)  243        MEDICATIONS  (STANDING):  ferrous    sulfate 325 milliGRAM(s) Oral daily  folic acid 1 milliGRAM(s) Oral daily  heparin   Injectable 5000 Unit(s) SubCutaneous every 12 hours  NIFEdipine XL 60 milliGRAM(s) Oral daily  NIFEdipine XL 30 milliGRAM(s) Oral <User Schedule>  prenatal multivitamin 1 Tablet(s) Oral daily    MEDICATIONS  (PRN):  senna 2 Tablet(s) Oral daily PRN Constipation

## 2022-11-25 LAB
ALBUMIN SERPL ELPH-MCNC: 3.1 G/DL — LOW (ref 3.3–5)
ALBUMIN SERPL ELPH-MCNC: 3.5 G/DL — SIGNIFICANT CHANGE UP (ref 3.3–5)
ALBUMIN SERPL ELPH-MCNC: 3.9 G/DL — SIGNIFICANT CHANGE UP (ref 3.3–5)
ALP SERPL-CCNC: 139 U/L — HIGH (ref 40–120)
ALP SERPL-CCNC: 159 U/L — HIGH (ref 40–120)
ALP SERPL-CCNC: 166 U/L — HIGH (ref 40–120)
ALT FLD-CCNC: 16 U/L — SIGNIFICANT CHANGE UP (ref 4–33)
ALT FLD-CCNC: 20 U/L — SIGNIFICANT CHANGE UP (ref 4–33)
ALT FLD-CCNC: 20 U/L — SIGNIFICANT CHANGE UP (ref 4–33)
ANION GAP SERPL CALC-SCNC: 12 MMOL/L — SIGNIFICANT CHANGE UP (ref 7–14)
APTT BLD: 32.7 SEC — SIGNIFICANT CHANGE UP (ref 27–36.3)
APTT BLD: 37 SEC — HIGH (ref 27–36.3)
APTT BLD: 39.8 SEC — HIGH (ref 27–36.3)
AST SERPL-CCNC: 26 U/L — SIGNIFICANT CHANGE UP (ref 4–32)
AST SERPL-CCNC: 29 U/L — SIGNIFICANT CHANGE UP (ref 4–32)
AST SERPL-CCNC: 30 U/L — SIGNIFICANT CHANGE UP (ref 4–32)
BASOPHILS # BLD AUTO: 0.02 K/UL — SIGNIFICANT CHANGE UP (ref 0–0.2)
BASOPHILS # BLD AUTO: 0.02 K/UL — SIGNIFICANT CHANGE UP (ref 0–0.2)
BASOPHILS # BLD AUTO: 0.03 K/UL — SIGNIFICANT CHANGE UP (ref 0–0.2)
BASOPHILS NFR BLD AUTO: 0.2 % — SIGNIFICANT CHANGE UP (ref 0–2)
BASOPHILS NFR BLD AUTO: 0.2 % — SIGNIFICANT CHANGE UP (ref 0–2)
BASOPHILS NFR BLD AUTO: 0.4 % — SIGNIFICANT CHANGE UP (ref 0–2)
BILIRUB SERPL-MCNC: 0.2 MG/DL — SIGNIFICANT CHANGE UP (ref 0.2–1.2)
BILIRUB SERPL-MCNC: 0.2 MG/DL — SIGNIFICANT CHANGE UP (ref 0.2–1.2)
BILIRUB SERPL-MCNC: <0.2 MG/DL — SIGNIFICANT CHANGE UP (ref 0.2–1.2)
BUN SERPL-MCNC: 6 MG/DL — LOW (ref 7–23)
BUN SERPL-MCNC: 8 MG/DL — SIGNIFICANT CHANGE UP (ref 7–23)
BUN SERPL-MCNC: 9 MG/DL — SIGNIFICANT CHANGE UP (ref 7–23)
CALCIUM SERPL-MCNC: 8.4 MG/DL — SIGNIFICANT CHANGE UP (ref 8.4–10.5)
CALCIUM SERPL-MCNC: 9.2 MG/DL — SIGNIFICANT CHANGE UP (ref 8.4–10.5)
CALCIUM SERPL-MCNC: 9.5 MG/DL — SIGNIFICANT CHANGE UP (ref 8.4–10.5)
CHLORIDE SERPL-SCNC: 100 MMOL/L — SIGNIFICANT CHANGE UP (ref 98–107)
CHLORIDE SERPL-SCNC: 105 MMOL/L — SIGNIFICANT CHANGE UP (ref 98–107)
CHLORIDE SERPL-SCNC: 99 MMOL/L — SIGNIFICANT CHANGE UP (ref 98–107)
CO2 SERPL-SCNC: 18 MMOL/L — LOW (ref 22–31)
CO2 SERPL-SCNC: 18 MMOL/L — LOW (ref 22–31)
CO2 SERPL-SCNC: 19 MMOL/L — LOW (ref 22–31)
CREAT SERPL-MCNC: 0.8 MG/DL — SIGNIFICANT CHANGE UP (ref 0.5–1.3)
CREAT SERPL-MCNC: 0.83 MG/DL — SIGNIFICANT CHANGE UP (ref 0.5–1.3)
CREAT SERPL-MCNC: 0.89 MG/DL — SIGNIFICANT CHANGE UP (ref 0.5–1.3)
CULTURE RESULTS: SIGNIFICANT CHANGE UP
EGFR: 87 ML/MIN/1.73M2 — SIGNIFICANT CHANGE UP
EGFR: 95 ML/MIN/1.73M2 — SIGNIFICANT CHANGE UP
EGFR: 99 ML/MIN/1.73M2 — SIGNIFICANT CHANGE UP
EOSINOPHIL # BLD AUTO: 0.03 K/UL — SIGNIFICANT CHANGE UP (ref 0–0.5)
EOSINOPHIL # BLD AUTO: 0.04 K/UL — SIGNIFICANT CHANGE UP (ref 0–0.5)
EOSINOPHIL # BLD AUTO: 0.06 K/UL — SIGNIFICANT CHANGE UP (ref 0–0.5)
EOSINOPHIL NFR BLD AUTO: 0.3 % — SIGNIFICANT CHANGE UP (ref 0–6)
EOSINOPHIL NFR BLD AUTO: 0.5 % — SIGNIFICANT CHANGE UP (ref 0–6)
EOSINOPHIL NFR BLD AUTO: 0.7 % — SIGNIFICANT CHANGE UP (ref 0–6)
FIBRINOGEN PPP-MCNC: 654 MG/DL — HIGH (ref 200–465)
FIBRINOGEN PPP-MCNC: 737 MG/DL — HIGH (ref 200–465)
FIBRINOGEN PPP-MCNC: 746 MG/DL — HIGH (ref 200–465)
GLUCOSE SERPL-MCNC: 84 MG/DL — SIGNIFICANT CHANGE UP (ref 70–99)
GLUCOSE SERPL-MCNC: 89 MG/DL — SIGNIFICANT CHANGE UP (ref 70–99)
GLUCOSE SERPL-MCNC: 97 MG/DL — SIGNIFICANT CHANGE UP (ref 70–99)
HCT VFR BLD CALC: 36.7 % — SIGNIFICANT CHANGE UP (ref 34.5–45)
HCT VFR BLD CALC: 39.9 % — SIGNIFICANT CHANGE UP (ref 34.5–45)
HCT VFR BLD CALC: 42.2 % — SIGNIFICANT CHANGE UP (ref 34.5–45)
HCV RNA SPEC NAA+PROBE-LOG IU: SIGNIFICANT CHANGE UP
HCV RNA SPEC NAA+PROBE-LOG IU: SIGNIFICANT CHANGE UP LOGIU/ML
HGB BLD-MCNC: 12 G/DL — SIGNIFICANT CHANGE UP (ref 11.5–15.5)
HGB BLD-MCNC: 13.6 G/DL — SIGNIFICANT CHANGE UP (ref 11.5–15.5)
HGB BLD-MCNC: 14 G/DL — SIGNIFICANT CHANGE UP (ref 11.5–15.5)
IANC: 5.01 K/UL — SIGNIFICANT CHANGE UP (ref 1.8–7.4)
IANC: 5.64 K/UL — SIGNIFICANT CHANGE UP (ref 1.8–7.4)
IANC: 6.92 K/UL — SIGNIFICANT CHANGE UP (ref 1.8–7.4)
IMM GRANULOCYTES NFR BLD AUTO: 0.5 % — SIGNIFICANT CHANGE UP (ref 0–0.9)
IMM GRANULOCYTES NFR BLD AUTO: 0.5 % — SIGNIFICANT CHANGE UP (ref 0–0.9)
IMM GRANULOCYTES NFR BLD AUTO: 0.6 % — SIGNIFICANT CHANGE UP (ref 0–0.9)
INR BLD: 0.84 RATIO — SIGNIFICANT CHANGE UP (ref 0.88–1.16)
INR BLD: 0.93 RATIO — SIGNIFICANT CHANGE UP (ref 0.88–1.16)
INR BLD: <0.9 RATIO — LOW (ref 0.88–1.16)
LDH SERPL L TO P-CCNC: 299 U/L — HIGH (ref 135–225)
LDH SERPL L TO P-CCNC: 302 U/L — HIGH (ref 135–225)
LDH SERPL L TO P-CCNC: 345 U/L — HIGH (ref 135–225)
LYMPHOCYTES # BLD AUTO: 1.8 K/UL — SIGNIFICANT CHANGE UP (ref 1–3.3)
LYMPHOCYTES # BLD AUTO: 18.9 % — SIGNIFICANT CHANGE UP (ref 13–44)
LYMPHOCYTES # BLD AUTO: 2.1 K/UL — SIGNIFICANT CHANGE UP (ref 1–3.3)
LYMPHOCYTES # BLD AUTO: 2.53 K/UL — SIGNIFICANT CHANGE UP (ref 1–3.3)
LYMPHOCYTES # BLD AUTO: 25.1 % — SIGNIFICANT CHANGE UP (ref 13–44)
LYMPHOCYTES # BLD AUTO: 31 % — SIGNIFICANT CHANGE UP (ref 13–44)
MAGNESIUM SERPL-MCNC: 6.2 MG/DL — HIGH (ref 1.6–2.6)
MAGNESIUM SERPL-MCNC: 6.8 MG/DL — HIGH (ref 1.6–2.6)
MCHC RBC-ENTMCNC: 28.1 PG — SIGNIFICANT CHANGE UP (ref 27–34)
MCHC RBC-ENTMCNC: 28.5 PG — SIGNIFICANT CHANGE UP (ref 27–34)
MCHC RBC-ENTMCNC: 28.6 PG — SIGNIFICANT CHANGE UP (ref 27–34)
MCHC RBC-ENTMCNC: 32.7 GM/DL — SIGNIFICANT CHANGE UP (ref 32–36)
MCHC RBC-ENTMCNC: 33.2 GM/DL — SIGNIFICANT CHANGE UP (ref 32–36)
MCHC RBC-ENTMCNC: 34.1 GM/DL — SIGNIFICANT CHANGE UP (ref 32–36)
MCV RBC AUTO: 84 FL — SIGNIFICANT CHANGE UP (ref 80–100)
MCV RBC AUTO: 84.6 FL — SIGNIFICANT CHANGE UP (ref 80–100)
MCV RBC AUTO: 87.2 FL — SIGNIFICANT CHANGE UP (ref 80–100)
MONOCYTES # BLD AUTO: 0.47 K/UL — SIGNIFICANT CHANGE UP (ref 0–0.9)
MONOCYTES # BLD AUTO: 0.51 K/UL — SIGNIFICANT CHANGE UP (ref 0–0.9)
MONOCYTES # BLD AUTO: 0.69 K/UL — SIGNIFICANT CHANGE UP (ref 0–0.9)
MONOCYTES NFR BLD AUTO: 5.8 % — SIGNIFICANT CHANGE UP (ref 2–14)
MONOCYTES NFR BLD AUTO: 6.1 % — SIGNIFICANT CHANGE UP (ref 2–14)
MONOCYTES NFR BLD AUTO: 7.3 % — SIGNIFICANT CHANGE UP (ref 2–14)
NEUTROPHILS # BLD AUTO: 5.01 K/UL — SIGNIFICANT CHANGE UP (ref 1.8–7.4)
NEUTROPHILS # BLD AUTO: 5.64 K/UL — SIGNIFICANT CHANGE UP (ref 1.8–7.4)
NEUTROPHILS # BLD AUTO: 6.92 K/UL — SIGNIFICANT CHANGE UP (ref 1.8–7.4)
NEUTROPHILS NFR BLD AUTO: 61.5 % — SIGNIFICANT CHANGE UP (ref 43–77)
NEUTROPHILS NFR BLD AUTO: 67.6 % — SIGNIFICANT CHANGE UP (ref 43–77)
NEUTROPHILS NFR BLD AUTO: 72.8 % — SIGNIFICANT CHANGE UP (ref 43–77)
NRBC # BLD: 0 /100 WBCS — SIGNIFICANT CHANGE UP (ref 0–0)
NRBC # FLD: 0 K/UL — SIGNIFICANT CHANGE UP (ref 0–0)
NRBC # FLD: 0 K/UL — SIGNIFICANT CHANGE UP (ref 0–0)
NRBC # FLD: 0.02 K/UL — HIGH (ref 0–0)
PLATELET # BLD AUTO: 171 K/UL — SIGNIFICANT CHANGE UP (ref 150–400)
PLATELET # BLD AUTO: 173 K/UL — SIGNIFICANT CHANGE UP (ref 150–400)
PLATELET # BLD AUTO: 186 K/UL — SIGNIFICANT CHANGE UP (ref 150–400)
POTASSIUM SERPL-MCNC: 4 MMOL/L — SIGNIFICANT CHANGE UP (ref 3.5–5.3)
POTASSIUM SERPL-MCNC: 4.2 MMOL/L — SIGNIFICANT CHANGE UP (ref 3.5–5.3)
POTASSIUM SERPL-MCNC: 4.2 MMOL/L — SIGNIFICANT CHANGE UP (ref 3.5–5.3)
POTASSIUM SERPL-SCNC: 4 MMOL/L — SIGNIFICANT CHANGE UP (ref 3.5–5.3)
POTASSIUM SERPL-SCNC: 4.2 MMOL/L — SIGNIFICANT CHANGE UP (ref 3.5–5.3)
POTASSIUM SERPL-SCNC: 4.2 MMOL/L — SIGNIFICANT CHANGE UP (ref 3.5–5.3)
PROT SERPL-MCNC: 6.7 G/DL — SIGNIFICANT CHANGE UP (ref 6–8.3)
PROT SERPL-MCNC: 7.6 G/DL — SIGNIFICANT CHANGE UP (ref 6–8.3)
PROT SERPL-MCNC: 8.2 G/DL — SIGNIFICANT CHANGE UP (ref 6–8.3)
PROTHROM AB SERPL-ACNC: 10 SEC — LOW (ref 10.5–13.4)
PROTHROM AB SERPL-ACNC: 10.8 SEC — SIGNIFICANT CHANGE UP (ref 10.5–13.4)
PROTHROM AB SERPL-ACNC: 9.7 SEC — LOW (ref 10.5–13.4)
RBC # BLD: 4.21 M/UL — SIGNIFICANT CHANGE UP (ref 3.8–5.2)
RBC # BLD: 4.75 M/UL — SIGNIFICANT CHANGE UP (ref 3.8–5.2)
RBC # BLD: 4.99 M/UL — SIGNIFICANT CHANGE UP (ref 3.8–5.2)
RBC # FLD: 15.9 % — HIGH (ref 10.3–14.5)
RBC # FLD: 16 % — HIGH (ref 10.3–14.5)
RBC # FLD: 16.1 % — HIGH (ref 10.3–14.5)
SODIUM SERPL-SCNC: 129 MMOL/L — LOW (ref 135–145)
SODIUM SERPL-SCNC: 130 MMOL/L — LOW (ref 135–145)
SODIUM SERPL-SCNC: 136 MMOL/L — SIGNIFICANT CHANGE UP (ref 135–145)
SPECIMEN SOURCE: SIGNIFICANT CHANGE UP
URATE SERPL-MCNC: 5.8 MG/DL — SIGNIFICANT CHANGE UP (ref 2.5–7)
URATE SERPL-MCNC: 6 MG/DL — SIGNIFICANT CHANGE UP (ref 2.5–7)
URATE SERPL-MCNC: 6.1 MG/DL — SIGNIFICANT CHANGE UP (ref 2.5–7)
WBC # BLD: 8.15 K/UL — SIGNIFICANT CHANGE UP (ref 3.8–10.5)
WBC # BLD: 8.35 K/UL — SIGNIFICANT CHANGE UP (ref 3.8–10.5)
WBC # BLD: 9.51 K/UL — SIGNIFICANT CHANGE UP (ref 3.8–10.5)
WBC # FLD AUTO: 8.15 K/UL — SIGNIFICANT CHANGE UP (ref 3.8–10.5)
WBC # FLD AUTO: 8.35 K/UL — SIGNIFICANT CHANGE UP (ref 3.8–10.5)
WBC # FLD AUTO: 9.51 K/UL — SIGNIFICANT CHANGE UP (ref 3.8–10.5)

## 2022-11-25 RX ORDER — AMPICILLIN TRIHYDRATE 250 MG
1 CAPSULE ORAL EVERY 4 HOURS
Refills: 0 | Status: DISCONTINUED | OUTPATIENT
Start: 2022-11-25 | End: 2022-11-26

## 2022-11-25 RX ORDER — MAGNESIUM SULFATE 500 MG/ML
1.5 VIAL (ML) INJECTION
Qty: 40 | Refills: 0 | Status: COMPLETED | OUTPATIENT
Start: 2022-11-25 | End: 2022-11-26

## 2022-11-25 RX ORDER — OXYTOCIN 10 UNIT/ML
2 VIAL (ML) INJECTION
Qty: 30 | Refills: 0 | Status: DISCONTINUED | OUTPATIENT
Start: 2022-11-25 | End: 2022-11-26

## 2022-11-25 RX ORDER — CITRIC ACID/SODIUM CITRATE 300-500 MG
15 SOLUTION, ORAL ORAL EVERY 6 HOURS
Refills: 0 | Status: DISCONTINUED | OUTPATIENT
Start: 2022-11-25 | End: 2022-11-26

## 2022-11-25 RX ORDER — SODIUM CHLORIDE 9 MG/ML
1000 INJECTION, SOLUTION INTRAVENOUS
Refills: 0 | Status: DISCONTINUED | OUTPATIENT
Start: 2022-11-25 | End: 2022-11-26

## 2022-11-25 RX ORDER — MAGNESIUM SULFATE 500 MG/ML
4 VIAL (ML) INJECTION ONCE
Refills: 0 | Status: COMPLETED | OUTPATIENT
Start: 2022-11-25 | End: 2022-11-25

## 2022-11-25 RX ORDER — MAGNESIUM SULFATE 500 MG/ML
2 VIAL (ML) INJECTION
Qty: 40 | Refills: 0 | Status: DISCONTINUED | OUTPATIENT
Start: 2022-11-25 | End: 2022-11-26

## 2022-11-25 RX ORDER — CHLORHEXIDINE GLUCONATE 213 G/1000ML
1 SOLUTION TOPICAL ONCE
Refills: 0 | Status: DISCONTINUED | OUTPATIENT
Start: 2022-11-25 | End: 2022-11-26

## 2022-11-25 RX ORDER — AMPICILLIN TRIHYDRATE 250 MG
2 CAPSULE ORAL ONCE
Refills: 0 | Status: COMPLETED | OUTPATIENT
Start: 2022-11-25 | End: 2022-11-25

## 2022-11-25 RX ORDER — OXYTOCIN 10 UNIT/ML
333.33 VIAL (ML) INJECTION
Qty: 20 | Refills: 0 | Status: COMPLETED | OUTPATIENT
Start: 2022-11-25 | End: 2022-11-25

## 2022-11-25 RX ORDER — ACETAMINOPHEN 500 MG
975 TABLET ORAL ONCE
Refills: 0 | Status: COMPLETED | OUTPATIENT
Start: 2022-11-25 | End: 2022-11-25

## 2022-11-25 RX ORDER — SODIUM CHLORIDE 9 MG/ML
1000 INJECTION, SOLUTION INTRAVENOUS
Refills: 0 | Status: DISCONTINUED | OUTPATIENT
Start: 2022-11-25 | End: 2022-11-25

## 2022-11-25 RX ADMIN — Medication 300 GRAM(S): at 05:46

## 2022-11-25 RX ADMIN — Medication 200 GRAM(S): at 06:14

## 2022-11-25 RX ADMIN — Medication 108 GRAM(S): at 22:04

## 2022-11-25 RX ADMIN — Medication 30 MILLIGRAM(S): at 15:19

## 2022-11-25 RX ADMIN — Medication 60 MILLIGRAM(S): at 06:34

## 2022-11-25 RX ADMIN — Medication 50 GM/HR: at 07:09

## 2022-11-25 RX ADMIN — Medication 50 GM/HR: at 19:15

## 2022-11-25 RX ADMIN — Medication 108 GRAM(S): at 14:06

## 2022-11-25 RX ADMIN — SODIUM CHLORIDE 50 MILLILITER(S): 9 INJECTION, SOLUTION INTRAVENOUS at 12:57

## 2022-11-25 RX ADMIN — Medication 108 GRAM(S): at 10:28

## 2022-11-25 RX ADMIN — Medication 50 GM/HR: at 06:13

## 2022-11-25 RX ADMIN — Medication 975 MILLIGRAM(S): at 15:18

## 2022-11-25 RX ADMIN — Medication 975 MILLIGRAM(S): at 15:40

## 2022-11-25 RX ADMIN — Medication 108 GRAM(S): at 17:58

## 2022-11-25 NOTE — CHART NOTE - NSCHARTNOTEFT_GEN_A_CORE
Patient transferred to L&D from antepartum service for scheduled IOL 2/2 sPEC w/ IUGR. Patient stable and doing well.     Vital Signs Last 24 Hrs  T(C): 36.9 (25 Nov 2022 01:17), Max: 36.9 (25 Nov 2022 01:17)  T(F): 98.4 (25 Nov 2022 01:17), Max: 98.4 (25 Nov 2022 01:17)  HR: 66 (25 Nov 2022 05:37) (65 - 74)  BP: 157/94 (25 Nov 2022 05:37) (125/79 - 157/94)  BP(mean): --  RR: 17 (25 Nov 2022 01:17) (16 - 20)  SpO2: 99% (25 Nov 2022 01:17) (99% - 100%)    Parameters below as of 25 Nov 2022 01:17  Patient On (Oxygen Delivery Method): room air    SVE: 0/0/-3  FHT: 140/mod/+accel/-decel  Roland: irritability    Plan  - IOL consents signed   - Start IOL w/ PO cytotec and CB when able  - Ampicilin for GBS+  - MgSO4 for seizure ppx  - HELLP q6hr before epidural  - Hepatitis C sent, as patient desires to take home placenta    d/w Dr. Shelley Jean PGY3 Patient transferred to L&D from antepartum service for scheduled IOL 2/2 sPEC w/ IUGR. Patient stable and doing well.     Vital Signs Last 24 Hrs  T(C): 36.9 (25 Nov 2022 01:17), Max: 36.9 (25 Nov 2022 01:17)  T(F): 98.4 (25 Nov 2022 01:17), Max: 98.4 (25 Nov 2022 01:17)  HR: 66 (25 Nov 2022 05:37) (65 - 74)  BP: 157/94 (25 Nov 2022 05:37) (125/79 - 157/94)  BP(mean): --  RR: 17 (25 Nov 2022 01:17) (16 - 20)  SpO2: 99% (25 Nov 2022 01:17) (99% - 100%)    Parameters below as of 25 Nov 2022 01:17  Patient On (Oxygen Delivery Method): room air    SVE: 0/0/-3  FHT: 140/mod/+accel/-decel  Hermosa: irritability    Plan  - IOL consents signed   - Start IOL w/ buccal cytotec and CB when able  - Ampicilin for GBS+  - MgSO4 for seizure ppx  - HELLP q6hr before epidural  - Hepatitis C sent, as patient desires to take home placenta    d/w Dr. Shelley Jean PGY3

## 2022-11-26 ENCOUNTER — TRANSCRIPTION ENCOUNTER (OUTPATIENT)
Age: 32
End: 2022-11-26

## 2022-11-26 ENCOUNTER — RESULT REVIEW (OUTPATIENT)
Age: 32
End: 2022-11-26

## 2022-11-26 LAB
ALBUMIN SERPL ELPH-MCNC: 3 G/DL — LOW (ref 3.3–5)
ALBUMIN SERPL ELPH-MCNC: 3.5 G/DL — SIGNIFICANT CHANGE UP (ref 3.3–5)
ALBUMIN SERPL ELPH-MCNC: 3.5 G/DL — SIGNIFICANT CHANGE UP (ref 3.3–5)
ALP SERPL-CCNC: 127 U/L — HIGH (ref 40–120)
ALP SERPL-CCNC: 155 U/L — HIGH (ref 40–120)
ALP SERPL-CCNC: 157 U/L — HIGH (ref 40–120)
ALT FLD-CCNC: 22 U/L — SIGNIFICANT CHANGE UP (ref 4–33)
ALT FLD-CCNC: 22 U/L — SIGNIFICANT CHANGE UP (ref 4–33)
ALT FLD-CCNC: 24 U/L — SIGNIFICANT CHANGE UP (ref 4–33)
ANION GAP SERPL CALC-SCNC: 11 MMOL/L — SIGNIFICANT CHANGE UP (ref 7–14)
ANION GAP SERPL CALC-SCNC: 14 MMOL/L — SIGNIFICANT CHANGE UP (ref 7–14)
ANION GAP SERPL CALC-SCNC: 14 MMOL/L — SIGNIFICANT CHANGE UP (ref 7–14)
APTT BLD: 28 SEC — SIGNIFICANT CHANGE UP (ref 27–36.3)
APTT BLD: 28.3 SEC — SIGNIFICANT CHANGE UP (ref 27–36.3)
APTT BLD: 30.4 SEC — SIGNIFICANT CHANGE UP (ref 27–36.3)
AST SERPL-CCNC: 35 U/L — HIGH (ref 4–32)
AST SERPL-CCNC: 37 U/L — HIGH (ref 4–32)
AST SERPL-CCNC: 37 U/L — HIGH (ref 4–32)
BASOPHILS # BLD AUTO: 0.01 K/UL — SIGNIFICANT CHANGE UP (ref 0–0.2)
BASOPHILS # BLD AUTO: 0.01 K/UL — SIGNIFICANT CHANGE UP (ref 0–0.2)
BASOPHILS # BLD AUTO: 0.02 K/UL — SIGNIFICANT CHANGE UP (ref 0–0.2)
BASOPHILS NFR BLD AUTO: 0.1 % — SIGNIFICANT CHANGE UP (ref 0–2)
BILIRUB SERPL-MCNC: 0.2 MG/DL — SIGNIFICANT CHANGE UP (ref 0.2–1.2)
BILIRUB SERPL-MCNC: 0.3 MG/DL — SIGNIFICANT CHANGE UP (ref 0.2–1.2)
BILIRUB SERPL-MCNC: 0.3 MG/DL — SIGNIFICANT CHANGE UP (ref 0.2–1.2)
BUN SERPL-MCNC: 7 MG/DL — SIGNIFICANT CHANGE UP (ref 7–23)
BUN SERPL-MCNC: 7 MG/DL — SIGNIFICANT CHANGE UP (ref 7–23)
BUN SERPL-MCNC: 8 MG/DL — SIGNIFICANT CHANGE UP (ref 7–23)
CALCIUM SERPL-MCNC: 7.1 MG/DL — LOW (ref 8.4–10.5)
CALCIUM SERPL-MCNC: 7.5 MG/DL — LOW (ref 8.4–10.5)
CALCIUM SERPL-MCNC: 7.8 MG/DL — LOW (ref 8.4–10.5)
CHLORIDE SERPL-SCNC: 97 MMOL/L — LOW (ref 98–107)
CHLORIDE SERPL-SCNC: 98 MMOL/L — SIGNIFICANT CHANGE UP (ref 98–107)
CHLORIDE SERPL-SCNC: 98 MMOL/L — SIGNIFICANT CHANGE UP (ref 98–107)
CO2 SERPL-SCNC: 17 MMOL/L — LOW (ref 22–31)
CO2 SERPL-SCNC: 19 MMOL/L — LOW (ref 22–31)
CO2 SERPL-SCNC: 21 MMOL/L — LOW (ref 22–31)
CREAT SERPL-MCNC: 0.76 MG/DL — SIGNIFICANT CHANGE UP (ref 0.5–1.3)
CREAT SERPL-MCNC: 0.86 MG/DL — SIGNIFICANT CHANGE UP (ref 0.5–1.3)
CREAT SERPL-MCNC: 0.9 MG/DL — SIGNIFICANT CHANGE UP (ref 0.5–1.3)
EGFR: 105 ML/MIN/1.73M2 — SIGNIFICANT CHANGE UP
EGFR: 86 ML/MIN/1.73M2 — SIGNIFICANT CHANGE UP
EGFR: 91 ML/MIN/1.73M2 — SIGNIFICANT CHANGE UP
EOSINOPHIL # BLD AUTO: 0 K/UL — SIGNIFICANT CHANGE UP (ref 0–0.5)
EOSINOPHIL # BLD AUTO: 0.01 K/UL — SIGNIFICANT CHANGE UP (ref 0–0.5)
EOSINOPHIL # BLD AUTO: 0.01 K/UL — SIGNIFICANT CHANGE UP (ref 0–0.5)
EOSINOPHIL NFR BLD AUTO: 0 % — SIGNIFICANT CHANGE UP (ref 0–6)
EOSINOPHIL NFR BLD AUTO: 0.1 % — SIGNIFICANT CHANGE UP (ref 0–6)
EOSINOPHIL NFR BLD AUTO: 0.1 % — SIGNIFICANT CHANGE UP (ref 0–6)
FIBRINOGEN PPP-MCNC: 662 MG/DL — HIGH (ref 200–465)
FIBRINOGEN PPP-MCNC: 737 MG/DL — HIGH (ref 200–465)
FIBRINOGEN PPP-MCNC: 761 MG/DL — HIGH (ref 200–465)
GLUCOSE SERPL-MCNC: 100 MG/DL — HIGH (ref 70–99)
GLUCOSE SERPL-MCNC: 109 MG/DL — HIGH (ref 70–99)
GLUCOSE SERPL-MCNC: 115 MG/DL — HIGH (ref 70–99)
HCT VFR BLD CALC: 36.4 % — SIGNIFICANT CHANGE UP (ref 34.5–45)
HCT VFR BLD CALC: 40.3 % — SIGNIFICANT CHANGE UP (ref 34.5–45)
HCT VFR BLD CALC: 41.2 % — SIGNIFICANT CHANGE UP (ref 34.5–45)
HGB BLD-MCNC: 12.4 G/DL — SIGNIFICANT CHANGE UP (ref 11.5–15.5)
HGB BLD-MCNC: 13.4 G/DL — SIGNIFICANT CHANGE UP (ref 11.5–15.5)
HGB BLD-MCNC: 13.5 G/DL — SIGNIFICANT CHANGE UP (ref 11.5–15.5)
IANC: 12.39 K/UL — HIGH (ref 1.8–7.4)
IANC: 12.79 K/UL — HIGH (ref 1.8–7.4)
IANC: 8.87 K/UL — HIGH (ref 1.8–7.4)
IMM GRANULOCYTES NFR BLD AUTO: 0.5 % — SIGNIFICANT CHANGE UP (ref 0–0.9)
IMM GRANULOCYTES NFR BLD AUTO: 0.6 % — SIGNIFICANT CHANGE UP (ref 0–0.9)
IMM GRANULOCYTES NFR BLD AUTO: 0.7 % — SIGNIFICANT CHANGE UP (ref 0–0.9)
INR BLD: 0.9 RATIO — SIGNIFICANT CHANGE UP (ref 0.88–1.16)
INR BLD: 0.9 RATIO — SIGNIFICANT CHANGE UP (ref 0.88–1.16)
INR BLD: <0.9 RATIO — SIGNIFICANT CHANGE UP (ref 0.88–1.16)
LDH SERPL L TO P-CCNC: 329 U/L — HIGH (ref 135–225)
LDH SERPL L TO P-CCNC: 333 U/L — HIGH (ref 135–225)
LDH SERPL L TO P-CCNC: 384 U/L — HIGH (ref 135–225)
LYMPHOCYTES # BLD AUTO: 1.03 K/UL — SIGNIFICANT CHANGE UP (ref 1–3.3)
LYMPHOCYTES # BLD AUTO: 1.34 K/UL — SIGNIFICANT CHANGE UP (ref 1–3.3)
LYMPHOCYTES # BLD AUTO: 1.45 K/UL — SIGNIFICANT CHANGE UP (ref 1–3.3)
LYMPHOCYTES # BLD AUTO: 12.5 % — LOW (ref 13–44)
LYMPHOCYTES # BLD AUTO: 7.2 % — LOW (ref 13–44)
LYMPHOCYTES # BLD AUTO: 9.6 % — LOW (ref 13–44)
MAGNESIUM SERPL-MCNC: 6.4 MG/DL — HIGH (ref 1.6–2.6)
MAGNESIUM SERPL-MCNC: 6.6 MG/DL — HIGH (ref 1.6–2.6)
MAGNESIUM SERPL-MCNC: 6.6 MG/DL — HIGH (ref 1.6–2.6)
MAGNESIUM SERPL-MCNC: 6.7 MG/DL — HIGH (ref 1.6–2.6)
MCHC RBC-ENTMCNC: 27.6 PG — SIGNIFICANT CHANGE UP (ref 27–34)
MCHC RBC-ENTMCNC: 27.7 PG — SIGNIFICANT CHANGE UP (ref 27–34)
MCHC RBC-ENTMCNC: 28.3 PG — SIGNIFICANT CHANGE UP (ref 27–34)
MCHC RBC-ENTMCNC: 32.8 GM/DL — SIGNIFICANT CHANGE UP (ref 32–36)
MCHC RBC-ENTMCNC: 33.3 GM/DL — SIGNIFICANT CHANGE UP (ref 32–36)
MCHC RBC-ENTMCNC: 34.1 GM/DL — SIGNIFICANT CHANGE UP (ref 32–36)
MCV RBC AUTO: 83.1 FL — SIGNIFICANT CHANGE UP (ref 80–100)
MCV RBC AUTO: 83.1 FL — SIGNIFICANT CHANGE UP (ref 80–100)
MCV RBC AUTO: 84.6 FL — SIGNIFICANT CHANGE UP (ref 80–100)
MONOCYTES # BLD AUTO: 0.45 K/UL — SIGNIFICANT CHANGE UP (ref 0–0.9)
MONOCYTES # BLD AUTO: 0.69 K/UL — SIGNIFICANT CHANGE UP (ref 0–0.9)
MONOCYTES # BLD AUTO: 0.85 K/UL — SIGNIFICANT CHANGE UP (ref 0–0.9)
MONOCYTES NFR BLD AUTO: 4.2 % — SIGNIFICANT CHANGE UP (ref 2–14)
MONOCYTES NFR BLD AUTO: 4.8 % — SIGNIFICANT CHANGE UP (ref 2–14)
MONOCYTES NFR BLD AUTO: 5.6 % — SIGNIFICANT CHANGE UP (ref 2–14)
NEUTROPHILS # BLD AUTO: 12.39 K/UL — HIGH (ref 1.8–7.4)
NEUTROPHILS # BLD AUTO: 12.79 K/UL — HIGH (ref 1.8–7.4)
NEUTROPHILS # BLD AUTO: 8.87 K/UL — HIGH (ref 1.8–7.4)
NEUTROPHILS NFR BLD AUTO: 82.5 % — HIGH (ref 43–77)
NEUTROPHILS NFR BLD AUTO: 84.1 % — HIGH (ref 43–77)
NEUTROPHILS NFR BLD AUTO: 87.2 % — HIGH (ref 43–77)
NRBC # BLD: 0 /100 WBCS — SIGNIFICANT CHANGE UP (ref 0–0)
NRBC # FLD: 0 K/UL — SIGNIFICANT CHANGE UP (ref 0–0)
PLATELET # BLD AUTO: 163 K/UL — SIGNIFICANT CHANGE UP (ref 150–400)
PLATELET # BLD AUTO: 183 K/UL — SIGNIFICANT CHANGE UP (ref 150–400)
PLATELET # BLD AUTO: 187 K/UL — SIGNIFICANT CHANGE UP (ref 150–400)
POTASSIUM SERPL-MCNC: 4.1 MMOL/L — SIGNIFICANT CHANGE UP (ref 3.5–5.3)
POTASSIUM SERPL-MCNC: 4.2 MMOL/L — SIGNIFICANT CHANGE UP (ref 3.5–5.3)
POTASSIUM SERPL-MCNC: 5.2 MMOL/L — SIGNIFICANT CHANGE UP (ref 3.5–5.3)
POTASSIUM SERPL-SCNC: 4.1 MMOL/L — SIGNIFICANT CHANGE UP (ref 3.5–5.3)
POTASSIUM SERPL-SCNC: 4.2 MMOL/L — SIGNIFICANT CHANGE UP (ref 3.5–5.3)
POTASSIUM SERPL-SCNC: 5.2 MMOL/L — SIGNIFICANT CHANGE UP (ref 3.5–5.3)
PROT SERPL-MCNC: 6.5 G/DL — SIGNIFICANT CHANGE UP (ref 6–8.3)
PROT SERPL-MCNC: 7.4 G/DL — SIGNIFICANT CHANGE UP (ref 6–8.3)
PROT SERPL-MCNC: 7.5 G/DL — SIGNIFICANT CHANGE UP (ref 6–8.3)
PROTHROM AB SERPL-ACNC: 10.2 SEC — LOW (ref 10.5–13.4)
PROTHROM AB SERPL-ACNC: 10.4 SEC — LOW (ref 10.5–13.4)
PROTHROM AB SERPL-ACNC: 10.4 SEC — LOW (ref 10.5–13.4)
RBC # BLD: 4.38 M/UL — SIGNIFICANT CHANGE UP (ref 3.8–5.2)
RBC # BLD: 4.85 M/UL — SIGNIFICANT CHANGE UP (ref 3.8–5.2)
RBC # BLD: 4.87 M/UL — SIGNIFICANT CHANGE UP (ref 3.8–5.2)
RBC # FLD: 15.7 % — HIGH (ref 10.3–14.5)
RBC # FLD: 15.8 % — HIGH (ref 10.3–14.5)
RBC # FLD: 15.9 % — HIGH (ref 10.3–14.5)
SODIUM SERPL-SCNC: 128 MMOL/L — LOW (ref 135–145)
SODIUM SERPL-SCNC: 130 MMOL/L — LOW (ref 135–145)
SODIUM SERPL-SCNC: 131 MMOL/L — LOW (ref 135–145)
URATE SERPL-MCNC: 6 MG/DL — SIGNIFICANT CHANGE UP (ref 2.5–7)
URATE SERPL-MCNC: 6.4 MG/DL — SIGNIFICANT CHANGE UP (ref 2.5–7)
URATE SERPL-MCNC: 6.4 MG/DL — SIGNIFICANT CHANGE UP (ref 2.5–7)
WBC # BLD: 10.74 K/UL — HIGH (ref 3.8–10.5)
WBC # BLD: 14.23 K/UL — HIGH (ref 3.8–10.5)
WBC # BLD: 15.18 K/UL — HIGH (ref 3.8–10.5)
WBC # FLD AUTO: 10.74 K/UL — HIGH (ref 3.8–10.5)
WBC # FLD AUTO: 14.23 K/UL — HIGH (ref 3.8–10.5)
WBC # FLD AUTO: 15.18 K/UL — HIGH (ref 3.8–10.5)

## 2022-11-26 PROCEDURE — 59409 OBSTETRICAL CARE: CPT | Mod: U7,UB,GC

## 2022-11-26 PROCEDURE — 88307 TISSUE EXAM BY PATHOLOGIST: CPT | Mod: 26

## 2022-11-26 RX ORDER — ASCORBIC ACID 60 MG
0 TABLET,CHEWABLE ORAL
Qty: 0 | Refills: 0 | DISCHARGE

## 2022-11-26 RX ORDER — DIPHENHYDRAMINE HCL 50 MG
25 CAPSULE ORAL EVERY 6 HOURS
Refills: 0 | Status: DISCONTINUED | OUTPATIENT
Start: 2022-11-26 | End: 2022-11-28

## 2022-11-26 RX ORDER — SODIUM CHLORIDE 9 MG/ML
1000 INJECTION, SOLUTION INTRAVENOUS
Refills: 0 | Status: DISCONTINUED | OUTPATIENT
Start: 2022-11-26 | End: 2022-11-27

## 2022-11-26 RX ORDER — OXYTOCIN 10 UNIT/ML
41.67 VIAL (ML) INJECTION
Qty: 20 | Refills: 0 | Status: DISCONTINUED | OUTPATIENT
Start: 2022-11-26 | End: 2022-11-26

## 2022-11-26 RX ORDER — BENZOCAINE 10 %
1 GEL (GRAM) MUCOUS MEMBRANE EVERY 6 HOURS
Refills: 0 | Status: DISCONTINUED | OUTPATIENT
Start: 2022-11-26 | End: 2022-11-28

## 2022-11-26 RX ORDER — AER TRAVELER 0.5 G/1
1 SOLUTION RECTAL; TOPICAL EVERY 4 HOURS
Refills: 0 | Status: DISCONTINUED | OUTPATIENT
Start: 2022-11-26 | End: 2022-11-28

## 2022-11-26 RX ORDER — IBUPROFEN 200 MG
600 TABLET ORAL EVERY 6 HOURS
Refills: 0 | Status: COMPLETED | OUTPATIENT
Start: 2022-11-26 | End: 2023-10-25

## 2022-11-26 RX ORDER — HYDROCORTISONE 1 %
1 OINTMENT (GRAM) TOPICAL EVERY 6 HOURS
Refills: 0 | Status: DISCONTINUED | OUTPATIENT
Start: 2022-11-26 | End: 2022-11-28

## 2022-11-26 RX ORDER — NIFEDIPINE 30 MG
10 TABLET, EXTENDED RELEASE 24 HR ORAL ONCE
Refills: 0 | Status: COMPLETED | OUTPATIENT
Start: 2022-11-26 | End: 2022-11-26

## 2022-11-26 RX ORDER — IBUPROFEN 200 MG
1 TABLET ORAL
Qty: 0 | Refills: 0 | DISCHARGE
Start: 2022-11-26

## 2022-11-26 RX ORDER — MAGNESIUM SULFATE 500 MG/ML
1.5 VIAL (ML) INJECTION
Qty: 40 | Refills: 0 | Status: DISCONTINUED | OUTPATIENT
Start: 2022-11-26 | End: 2022-11-27

## 2022-11-26 RX ORDER — TETANUS TOXOID, REDUCED DIPHTHERIA TOXOID AND ACELLULAR PERTUSSIS VACCINE, ADSORBED 5; 2.5; 8; 8; 2.5 [IU]/.5ML; [IU]/.5ML; UG/.5ML; UG/.5ML; UG/.5ML
0.5 SUSPENSION INTRAMUSCULAR ONCE
Refills: 0 | Status: COMPLETED | OUTPATIENT
Start: 2022-11-26

## 2022-11-26 RX ORDER — OXYCODONE HYDROCHLORIDE 5 MG/1
5 TABLET ORAL ONCE
Refills: 0 | Status: DISCONTINUED | OUTPATIENT
Start: 2022-11-26 | End: 2022-11-28

## 2022-11-26 RX ORDER — SODIUM CHLORIDE 9 MG/ML
3 INJECTION INTRAMUSCULAR; INTRAVENOUS; SUBCUTANEOUS EVERY 8 HOURS
Refills: 0 | Status: DISCONTINUED | OUTPATIENT
Start: 2022-11-26 | End: 2022-11-28

## 2022-11-26 RX ORDER — PRAMOXINE HYDROCHLORIDE 150 MG/15G
1 AEROSOL, FOAM RECTAL EVERY 4 HOURS
Refills: 0 | Status: DISCONTINUED | OUTPATIENT
Start: 2022-11-26 | End: 2022-11-28

## 2022-11-26 RX ORDER — MORPHINE SULFATE 50 MG/1
4 CAPSULE, EXTENDED RELEASE ORAL ONCE
Refills: 0 | Status: DISCONTINUED | OUTPATIENT
Start: 2022-11-26 | End: 2022-11-26

## 2022-11-26 RX ORDER — ACETAMINOPHEN 500 MG
975 TABLET ORAL
Refills: 0 | Status: DISCONTINUED | OUTPATIENT
Start: 2022-11-26 | End: 2022-11-28

## 2022-11-26 RX ORDER — SIMETHICONE 80 MG/1
80 TABLET, CHEWABLE ORAL EVERY 4 HOURS
Refills: 0 | Status: DISCONTINUED | OUTPATIENT
Start: 2022-11-26 | End: 2022-11-28

## 2022-11-26 RX ORDER — DIBUCAINE 1 %
1 OINTMENT (GRAM) RECTAL EVERY 6 HOURS
Refills: 0 | Status: DISCONTINUED | OUTPATIENT
Start: 2022-11-26 | End: 2022-11-28

## 2022-11-26 RX ORDER — LANOLIN
1 OINTMENT (GRAM) TOPICAL EVERY 6 HOURS
Refills: 0 | Status: DISCONTINUED | OUTPATIENT
Start: 2022-11-26 | End: 2022-11-28

## 2022-11-26 RX ORDER — OXYCODONE HYDROCHLORIDE 5 MG/1
5 TABLET ORAL
Refills: 0 | Status: DISCONTINUED | OUTPATIENT
Start: 2022-11-26 | End: 2022-11-28

## 2022-11-26 RX ORDER — KETOROLAC TROMETHAMINE 30 MG/ML
30 SYRINGE (ML) INJECTION ONCE
Refills: 0 | Status: DISCONTINUED | OUTPATIENT
Start: 2022-11-26 | End: 2022-11-26

## 2022-11-26 RX ORDER — MAGNESIUM HYDROXIDE 400 MG/1
30 TABLET, CHEWABLE ORAL
Refills: 0 | Status: DISCONTINUED | OUTPATIENT
Start: 2022-11-26 | End: 2022-11-28

## 2022-11-26 RX ADMIN — Medication 37.5 GM/HR: at 07:02

## 2022-11-26 RX ADMIN — Medication 30 MILLIGRAM(S): at 11:03

## 2022-11-26 RX ADMIN — MAGNESIUM HYDROXIDE 30 MILLILITER(S): 400 TABLET, CHEWABLE ORAL at 21:30

## 2022-11-26 RX ADMIN — Medication 37.5 GM/HR: at 15:29

## 2022-11-26 RX ADMIN — Medication 37.5 GM/HR: at 19:37

## 2022-11-26 RX ADMIN — Medication 60 MILLIGRAM(S): at 05:54

## 2022-11-26 RX ADMIN — MORPHINE SULFATE 4 MILLIGRAM(S): 50 CAPSULE, EXTENDED RELEASE ORAL at 17:04

## 2022-11-26 RX ADMIN — Medication 10 MILLIGRAM(S): at 12:51

## 2022-11-26 RX ADMIN — SODIUM CHLORIDE 3 MILLILITER(S): 9 INJECTION INTRAMUSCULAR; INTRAVENOUS; SUBCUTANEOUS at 22:20

## 2022-11-26 RX ADMIN — MORPHINE SULFATE 4 MILLIGRAM(S): 50 CAPSULE, EXTENDED RELEASE ORAL at 18:04

## 2022-11-26 RX ADMIN — Medication 108 GRAM(S): at 05:54

## 2022-11-26 RX ADMIN — Medication 108 GRAM(S): at 01:56

## 2022-11-26 RX ADMIN — Medication 2 MILLIUNIT(S)/MIN: at 01:32

## 2022-11-26 RX ADMIN — Medication 1000 MILLIUNIT(S)/MIN: at 10:15

## 2022-11-26 NOTE — OB PROVIDER DELIVERY SUMMARY - NSPROVIDERDELIVERYNOTE_OBGYN_ALL_OB_FT
"Requested Prescriptions   Pending Prescriptions Disp Refills     oxybutynin chloride 15 MG TB24 [Pharmacy Med Name: Oxybutynin Chloride ER Oral Tablet Extended Release 24 Hour 15 MG] 60 tablet 0     Sig: TAKE 1 TABLET (15 MG) BY MOUTH 2 TIMES DAILY    Muscarinic Antagonists (Urinary Incontinence Agents) Passed    1/31/2018  9:22 AM       Passed - Recent or future visit with authorizing provider's specialty    Patient had office visit in the last year or has a visit in the next 30 days with authorizing provider.  See \"Patient Info\" tab in inbasket, or \"Choose Columns\" in Meds & Orders section of the refill encounter.            Passed - Medication is Oxybutynin and patient is 5 years of age or older       Passed - Patient does not have a diagnosis of glaucoma on the problem list    If glaucoma diagnosis is new, refer refill to physician.         Passed - Patient is 18 years of age or older          " Called to the Labor room 9 to evaluate patient. Patient was also found to be FD and +2 station. Patient was instructed to push. Patient had an atraumatic  of a live female infant in [place]. Delayed cord clamping was done and cord was obtained for cord gases. Infant handed to the pediatricians and Apgars 8/9. The placenta delivered spontaneously and intact, 3 vessels noted. Upon inspection of the perineum, a 1st degree laceration was noted and repaired with a 2-0 chromic suture. The baby and mother bonded well and the EBL 151cc.    Laps and sharp count were correct.    Baby name Journey

## 2022-11-26 NOTE — DISCHARGE NOTE OB - CARE PROVIDER_API CALL
Luna oRsa (MD)  Obstetrics and Gynecology  [facility] [place] [address]  Phone: [phone]  Fax: [phone]  Follow Up Time:

## 2022-11-26 NOTE — OB PROVIDER LABOR PROGRESS NOTE - NS_SUBJECTIVE/OBJECTIVE_OBGYN_ALL_OB_FT
Patient complaining of more rectal pressure with contractions. VE 6/80/0. Pitocin at 2, continue pitocin augmentation. Anticipate .    Senthil Dorantes MD
Pt seen and examined s/p cervical balloon
PGY2 Labor Note    Patient seen and evaluated at bedside for increased pressur e    T(C): 36.6 (11-26-22 @ 08:11), Max: 36.8 (11-26-22 @ 02:00)  HR: 72 (11-26-22 @ 09:30) (56 - 88)  BP: 120/66 (11-26-22 @ 09:20) (119/68 - 170/88)  RR: --  SpO2: 97% (11-26-22 @ 09:30) (82% - 100%)
Patient seen and examined for placement of cooks cervical balloon. Patient comfortable; no complaints at this time.  VS  T(C): 36.6 (11-25-22 @ 14:08)  HR: 68 (11-25-22 @ 17:12)  BP: 142/90 (11-25-22 @ 16:36)  RR: 17 (11-25-22 @ 01:17)  SpO2: 97% (11-25-22 @ 17:12)
Pt seen and examined due to increased rectal pressure  Balloon still in place
Pt seen and examined due to increased rectal pressure and pain with contractions

## 2022-11-26 NOTE — DISCHARGE NOTE OB - HOSPITAL COURSE
Patient admitted to antepartum for severe pre-eclampsia and IUGR. She had induction of labor at 34 weeks and a normal spontaneous vaginal delivery of a live female infant. Her postpartum course was uncomplicated.

## 2022-11-26 NOTE — CHART NOTE - NSCHARTNOTEFT_GEN_A_CORE
S: Pt seen at bedside for heavy vaginal bleeding. Per RN, pt was ambulating w/ assistance to the bathroom when she passed a clot "the size of my fist." Pt immediately examined at bedside. Pt reports lightheadedness and fatigue, but denies dizziness, loss of consciousness, CP, palpitations, SOB, dyspnea.  Pt had uncomplicated delivery w/ EBL 151mL w/ a 2nd degree laceration.     PE:  Vital Signs Last 24 Hrs  T(C): 36.7 (:30), Max: 36.8 (2022 02:00)  T(F): 98.1 (:30), Max: 98.24 (2022 02:00)  HR: 71 () (56 - 94)  BP: 128/63 () (111/59 - 170/88)  BP(mean): --  RR: 18 () (18 - 18)  SpO2: 100% () (82% - 100%)    Parameters below as of 30  Patient On (Oxygen Delivery Method): room air    I&O's Detail    2022 07:01  -  2022 07:00  --------------------------------------------------------  IN:    Lactated Ringers: 1487.5 mL    Magnesium Sulfate: 225 mL    Magnesium Sulfate: 687.5 mL  Total IN: 2400 mL    OUT:    Voided (mL): 3850 mL  Total OUT: 3850 mL    Total NET: -1450 mL      2022 07:01  -  2022 17:39  --------------------------------------------------------  IN:    Lactated Ringers: 125 mL    Magnesium Sulfate: 150 mL  Total IN: 275 mL    OUT:    Blood Loss (mL): 151 mL    Indwelling Catheter - Urethral (mL): 1275 mL    Voided (mL): 1900 mL  Total OUT: 3326 mL    Total NET: -3051 mL                          13.4   14.23 )-----------( 187      ( 2022 11:30 )             40.3     13.4  13.5  13.6      General: NAD A+Ox3  CV:  RRR.  Pulm: Normal effort of breathing   Extremities: Non-tender b/l, no edema.  Abd: Soft, mildly distended Appropriately tender. Fundus mildly boggy, unable to appreciate firmness 2/2 obstruction by bladder.    : Bimanual exploration revealed brisk bleeding initially on exam by Christina Lopez, PGY1. Small clots were palpated by pt unable to tolerate exam.      A/P: Pt PPD#0 s/p  evaluted for PPH. Dr. Alston, PGY4 alerted as patient unable to tolerate exam. Presented immediately to bedside with Dr. Dorantes, Attending Physician. U/S performed at bedside showed multiple small clots in the SHIRA. Patient given 4mg of Morphine IM and bimanual exam performed by Dr. Alston w/ removal of approximately 250cc's of blood. Monge catheter placed. Patient tolerated well. 1000mcg  Cytotec VA given. Patient counseled about pain 2/2 to uterine ctx's. Patient verbalized understanding.   - Stat CBC and coags sent.  - Pt stable. Will monitor closely.    Christina Lopez PGY1 S: Pt seen at bedside for heavy vaginal bleeding. Per RN, pt was ambulating w/ assistance to the bathroom when she passed a clot "the size of my fist." Pt immediately examined at bedside. Pt reports lightheadedness and fatigue, but denies dizziness, loss of consciousness, CP, palpitations, SOB, dyspnea.  Pt had uncomplicated delivery w/ EBL 151mL w/ a 2nd degree laceration.     PE:  Vital Signs Last 24 Hrs  T(C): 36.7 (:30), Max: 36.8 (2022 02:00)  T(F): 98.1 (:30), Max: 98.24 (2022 02:00)  HR: 71 () (56 - 94)  BP: 128/63 () (111/59 - 170/88)  BP(mean): --  RR: 18 () (18 - 18)  SpO2: 100% () (82% - 100%)    Parameters below as of 30  Patient On (Oxygen Delivery Method): room air    I&O's Detail    2022 07:01  -  2022 07:00  --------------------------------------------------------  IN:    Lactated Ringers: 1487.5 mL    Magnesium Sulfate: 225 mL    Magnesium Sulfate: 687.5 mL  Total IN: 2400 mL    OUT:    Voided (mL): 3850 mL  Total OUT: 3850 mL    Total NET: -1450 mL      2022 07:01  -  2022 17:39  --------------------------------------------------------  IN:    Lactated Ringers: 125 mL    Magnesium Sulfate: 150 mL  Total IN: 275 mL    OUT:    Blood Loss (mL): 151 mL    Indwelling Catheter - Urethral (mL): 1275 mL    Voided (mL): 1900 mL  Total OUT: 3326 mL    Total NET: -3051 mL                          13.4   14.23 )-----------( 187      ( 2022 11:30 )             40.3     13.4  13.5  13.6      General: NAD A+Ox3  CV:  RRR.  Pulm: Normal effort of breathing   Extremities: Non-tender b/l, no edema.  Abd: Soft, mildly distended Appropriately tender. Fundus mildly boggy, unable to appreciate firmness 2/2 obstruction by bladder.    : Bimanual exploration revealed brisk bleeding initially on exam by Christina Lopez, PGY1. Small clots were palpated by pt unable to tolerate exam.      A/P: Pt PPD#0 s/p  evaluted for PPH. Dr. Alston, PGY4 alerted as patient unable to tolerate exam. Presented immediately to bedside with Dr. Dorantes, Attending Physician. U/S performed at bedside showed multiple small clots in the SHIRA. Patient given 4mg of Morphine IM and bimanual exam performed by Dr. Alston w/ removal of approximately 250cc's of blood. Monge catheter placed. Patient tolerated well. 1000mcg  Cytotec GA given. Patient counseled about pain 2/2 to uterine ctx's. Patient verbalized understanding.   - Stat CBC and coags sent.  - Pt stable. Will monitor closely.    Christina Lopez PGY1        --------------------------------------------------------------------------------------------------------------------------------------------------------------------------------    R4 Addendum    As noted above, called by PGY-1 after evaluation. Patient with clot noted throughout uterus on BSUS, h/o sPEC on Magnesium delivered at 34w.   Evacuated ~250cc manually. Thin stripe noted following.  Monge catheter placed, and cytoPR given.   -STAT HELLP  -monitor bleeding and vitals  - f/u after lab results    Dr. Dorantes at bedside throughout.   [place]ney PGY-4

## 2022-11-26 NOTE — OB RN DELIVERY SUMMARY - NS_SPECIMENTYPE_OBGYN_ALL_OB
Anxiety   WHAT YOU SHOULD KNOW:   Anxiety is a condition that causes you to feel excessive worry, uneasiness, or fear  Family or work stress, smoking, caffeine, and alcohol can increase your risk for anxiety  Certain medicines or health conditions can also increase your risk  Anxiety may begin gradually, and can become a long-term condition if it is not managed or treated  AFTER YOU LEAVE:   Medicines:   · Medicines  can help you feel more calm and relaxed, and decrease your symptoms  · Take your medicine as directed  Contact your healthcare provider if you think your medicine is not helping or if you have side effects  Tell him if you are allergic to any medicine  Keep a list of the medicines, vitamins, and herbs you take  Include the amounts, and when and why you take them  Bring the list or the pill bottles to follow-up visits  Carry your medicine list with you in case of an emergency  Follow up with your healthcare provider within 2 weeks or as directed:  Write down your questions so you remember to ask them during your visits  Manage anxiety:   · Go to counseling as directed  Cognitive behavioral therapy can help you understand and change how you react to events that trigger your symptoms  · Find ways to manage your symptoms  Activities such as exercise, meditation, or listening to music can help you relax  · Practice deep breathing  Breathing can change how your body reacts to stress  Focus on taking slow, deep breaths several times a day, or during an anxiety attack  Breathe in through your nose, and out through your mouth  · Avoid caffeine  Caffeine can make your symptoms worse  Avoid foods or drinks that are meant to increase your energy level  · Limit or avoid alcohol  Ask your healthcare provider if alcohol is safe for you  You may not be able to drink alcohol if you take certain anxiety or depression medicines  Limit alcohol to 1 drink per day if you are a woman   Limit alcohol to 2 drinks per day if you are a man  A drink of alcohol is 12 ounces of beer, 5 ounces of wine, or 1½ ounces of liquor  Contact your healthcare provider if:   · Your symptoms get worse or do not get better with treatment  · You think your medicine may be causing side effects  · Your anxiety keeps you from doing your regular daily activities  · You have new symptoms since your last visit  · You have questions or concerns about your condition or care  Seek care immediately or call 911 if:   · You have chest pain, tightness, or heaviness that may spread to your shoulders, arms, jaw, neck, or back  · You feel like hurting yourself or someone else  · You feel dizzy, lightheaded, or faint  © 2014 [employer] is for End User's use only and may not be sold, redistributed or otherwise used for commercial purposes  All illustrations and images included in CareNotes® are the copyrighted property of A D [employer] M [employer]  or [place]  The above information is an  only  It is not intended as medical advice for individual conditions or treatments  Talk to your doctor, nurse or pharmacist before following any medical regimen to see if it is safe and effective for you  Placenta

## 2022-11-26 NOTE — OB PROVIDER LABOR PROGRESS NOTE - NS_OBIHIFHRDETAILS_OBGYN_ALL_OB_FT
Baseline: 125  Ross: Mod  Accels: -   Decels: -
125/mod/+accels/-decels
125/mod/+accels/-decels
130 mod nahed/+accels/-decels
130/mod/+accels/-decels

## 2022-11-26 NOTE — OB PROVIDER LABOR PROGRESS NOTE - ASSESSMENT
@34.0wks sPEC/Mg; IUGR  Cervical change noted  Cooks cervical balloon placed without incident  60cc's instilled in both uterine and vaginal balloons  Patient tolerated well  Patient declining pain management at this time  Cont with buccal cytotec  Cont EFM/TOCO  Cont BP monitoring  Cont magnesium  Will reassess PRN    juhi Pond NP
CB remains in place  Pt to ambulate to bathroom  Continue buccal cytotec  Epidural PRN    nichole MAYES
Pt requesting epidural, anesthesia consult  Continue mami varela MD
discontinue cytotec, transition to pitocin  Pt declines epidural at this time    nichole MAYES
A/P: 32y P0 admitted for IUGR and sPEC IOL   - pt anterior lip and 0 station   - will reevaluate in 1 hr or PRN   - continuous monitoring/toco   - BPs q15 mins for sPEC  - c/w Mag for 24 hours postpartum   - anticipate      Asiya Irena PGY2

## 2022-11-26 NOTE — OB RN DELIVERY SUMMARY - NS_SEPSISRSKCALC_OBGYN_ALL_OB_FT
EOS calculated successfully. EOS Risk Factor: 0.5/1000 live births ([facility] national incidence); GA=34w1d; Temp=98.24; ROM=3.367; GBS='Unknown'; Antibiotics='GBS specific antibiotics > 2 hrs prior to birth'

## 2022-11-26 NOTE — OB RN DELIVERY SUMMARY - NSSELHIDDEN_OBGYN_ALL_OB_FT
[NS_DeliveryAttending1_OBGYN_ALL_OB_FT:[email or web address]=],[NS_DeliveryRN_OBGYN_ALL_OB_FT:[id number]=]

## 2022-11-26 NOTE — DISCHARGE NOTE OB - NSDCQMERRANDS_GEN_ALL_CORE
Subjective:      Cristina Tenorio is a 73 y.o. female who presents with CKD IV, and hypokalemia Follow-Up            HPI    Patient with with a history of Crohn's disease, CKD IV, cirrhosis, hepatic encephalopathy recently hospitalized for encephalopathy. Last visit increased sodium bicarb    1. CKD stage IV - Cr stable at 3, however, bicarb still low, taking sodium bicarbonate  2. HTN - BP at goal  3. Hyperchloremic acidosis -HCO3 at 9 despite increase in sodium bicarbonate  4. Hypokalemia - resolved     Review of Systems   Constitutional: Negative for fever and chills.   Cardiovascular: Negative for chest pain and palpitations.          Objective:     /78 mmHg  Pulse 72  Temp(Src) 36.6 °C (97.9 °F) (Temporal)  Resp 14  Ht 1.524 m (5')  Wt 67.586 kg (149 lb)  BMI 29.10 kg/m2  LMP 06/29/1995     Physical Exam   Constitutional: She is oriented to person, place, and time. She appears well-developed and well-nourished.   Cardiovascular: Normal rate and regular rhythm.    Pulmonary/Chest: Effort normal and breath sounds normal.   Neurological: She is alert and oriented to person, place, and time.   Skin: Skin is warm and dry.   Psychiatric: She has a normal mood and affect. Her behavior is normal.               Assessment/Plan:     1. Chronic kidney disease, stage IV (severe) (CMS-HCC)  Cr stable, however, stepwise increase from baseline. Still Vit D deficient. Increase Vit D.    - BASIC METABOLIC PANEL; Future       2. Hypokalemia  Resolved, continue KCl    3. Metabolic acidosis, NAG, bicarbonate losses  Will try again to increase sodium bicarbonate to 3 tabs TID, no evidence of the increase in Salt intake, HCO3 still 9, continued loss from GI tract.    -d/w patient including risks of acidosis  -Will follow up in 4 weeks  -Monitor carefully           No

## 2022-11-26 NOTE — OB PROVIDER DELIVERY SUMMARY - NSSELHIDDEN_OBGYN_ALL_OB_FT
[NS_DeliveryAttending1_OBGYN_ALL_OB_FT:[email or web address]=],[NS_DeliveryRN_OBGYN_ALL_OB_FT:[id number]=]

## 2022-11-26 NOTE — PROVIDER CONTACT NOTE (MEDICATION) - RECOMMENDATIONS
RN will continue to educate patient on the importance of taking her Blood pressure medication on time and the entire dose.

## 2022-11-26 NOTE — OB NEONATOLOGY/PEDIATRICIAN DELIVERY SUMMARY - NSPEDSNEONOTESA_OBGYN_ALL_OB_FT
34wk2d female born via  to a 33 y/o  mother. No significant maternal or prenatal history. JAG: 23. Maternal labs include Blood Type A+, HIV - , RPR NR , Rubella I , Hep B - , GBS + (), s/p amp x7, COVID + (on 22, not re-swabbed since). SROM at 0633 with bloody fluids (ROM hours: 3h 22m). Baby emerged vigorous, crying, was warmed, dried suctioned, deep suctioned briefly and stimulated with APGARS of 8/9 for color. Mom plans to initiate breastfeeding, declines Hep B vaccine.  Highest maternal temp: 36.6. EOS 0.32. No birth events.     Physical Exam:  Gen: no acute distress, +grimace, asymmetrically small size with large head relative to body  HEENT:  anterior fontanel open soft and flat, nondysmoprhic facies, no cleft lip/palate, ears normal set, no ear pits or tags, nares clinically patent  Resp: Normal respiratory effort without grunting or retractions, good air entry b/l, clear to auscultation bilaterally  Cardio: Present S1/S2, regular rate and rhythm, no murmurs  Abd: soft, non tender, non distended, umbilical cord with 3 vessels  Neuro: +palmar and plantar grasp, +suck, +xander, normal tone  Extremities: negative duran and ortolani maneuvers, moving all extremities, no clavicular crepitus or stepoff  Skin: pink, warm  Genitals:[Normal female anatomy], Saran 1, anus patent

## 2022-11-26 NOTE — DISCHARGE NOTE OB - MEDICATION SUMMARY - MEDICATIONS TO TAKE
I will START or STAY ON the medications listed below when I get home from the hospital:    ibuprofen 600 mg oral tablet  -- 1 tab(s) by mouth every 6 hours  -- Indication: For Pain    Procardia XL 60 mg oral tablet, extended release  -- 1 tab(s) by mouth once a day (in the morning)  -- Indication: For Hypertension    Procardia XL 30 mg oral tablet, extended release  -- 1 tab(s) by mouth once a day (in the evening)  -- Indication: For Hypertension   I will START or STAY ON the medications listed below when I get home from the hospital:    ibuprofen 600 mg oral tablet  -- 1 tab(s) by mouth every 6 hours, As Needed  -- Indication: For pain    Procardia XL 30 mg oral tablet, extended release  -- 1 tab(s) by mouth once a day (in the evening). Please do not take tablet if blood pressure is less than 100/60  -- Indication: For preeclampsia with severe features    Procardia XL 60 mg oral tablet, extended release  -- 1 tab(s) by mouth once a day (in the morning). Please do not take tablet if blood pressure is less than 100/60  -- Indication: For preeclampsia with severe features

## 2022-11-26 NOTE — OB POSTPARTUM EVENT NOTE - NS_EVENTFINDINGS1_OBGYN_ALL_OB_FT
MD made aware of BP, plan to give pt one time dose of Procardia 10 IR now. Pt to get scheduled dose of Procardia 30 XL at 1500. Plan to continue to observe BP.

## 2022-11-26 NOTE — DISCHARGE NOTE OB - NS MD DC FALL RISK RISK
For information on Fall & Injury Prevention, visit: [email or web address] OR  [email or web address] OR  https://www.cdc.gov/steadi/patient.html

## 2022-11-26 NOTE — OB PROVIDER DELIVERY SUMMARY - NSLOWPPHRISK_OBGYN_A_OB
No previous uterine incision/Chen Pregnancy/Less than or equal to 4 previous vaginal births/No known bleeding disorder/No history of postpartum hemorrhage/No other PPH risks indicated

## 2022-11-26 NOTE — OB PROVIDER DELIVERY SUMMARY - NS_ADMITROM_OBGYN_ALL_OB
Ever Longo  11/2/2020  Wt Readings from Last 3 Encounters:   11/02/20 169 lb (76.7 kg)   02/27/20 140 lb 12.8 oz (63.9 kg)   10/31/17 159 lb (72.1 kg)     Body mass index is 27.28 kg/m². Pt here for OTV. Patient having things get stuck at times when he eats. Harmony Kohler spoke to patient. Dr. Parada Brod to [facility].    Treatment Area:lung    Patient was seen today for weekly visit. Comfort Alteration  Fatigue: Mild    Ventilation Alterations  Cough: Yes  Hemoptysis: No  Mucus Color: green/yellow  Dyspnea: No  O2 Sat: 95%    Nutritional Alteration  Anorexia: No  Nausea: No   Vomiting: No     Mucous Membrane Alteration  Voice Changes/ Stridor/Larynx: no  Pharynx & Esophagus: yes    Elimination Alterations  Constipation: no  Diarrhea:  no    Skin Alteration   Sensation:none    Radiation Dermatitis:  Intact [x]     Erythema  []     Discoloration  []     Rash []     Dry desquamation  []     Moist desquamation []       Emotional  Coping: effective      Injury, potential bleeding or infection: none    Lab Results   Component Value Date    WBC 6.26 07/09/2020     07/09/2020         /63   Pulse 63   Temp 96.6 °F (35.9 °C) (Oral)   Resp 16   Wt 169 lb (76.7 kg)   SpO2 95%   BMI 27.28 kg/m²   Patient Currently in Pain: No               Assessment/Plan: Patient was seen today for weekly visit.       Ora Jimenez No

## 2022-11-26 NOTE — PROVIDER CONTACT NOTE (MEDICATION) - ASSESSMENT
Pt asymptomatic. Denies headache, dizziness, SOB, chest pain. RN will continue to assess . BP documented in the flow sheet. WNL

## 2022-11-26 NOTE — DISCHARGE NOTE OB - PATIENT PORTAL LINK FT
You can access the FollowMyHealth Patient Portal offered by [facility] by registering at the following website: [email or web address]. By joining [employer]’s FollowMyHealth portal, you will also be able to view your health information using other applications (apps) compatible with our system.

## 2022-11-27 LAB
ALBUMIN SERPL ELPH-MCNC: 3.1 G/DL — LOW (ref 3.3–5)
ALP SERPL-CCNC: 116 U/L — SIGNIFICANT CHANGE UP (ref 40–120)
ALT FLD-CCNC: 23 U/L — SIGNIFICANT CHANGE UP (ref 4–33)
ANION GAP SERPL CALC-SCNC: 9 MMOL/L — SIGNIFICANT CHANGE UP (ref 7–14)
APTT BLD: 27.8 SEC — SIGNIFICANT CHANGE UP (ref 27–36.3)
AST SERPL-CCNC: 34 U/L — HIGH (ref 4–32)
BASOPHILS # BLD AUTO: 0.01 K/UL — SIGNIFICANT CHANGE UP (ref 0–0.2)
BASOPHILS NFR BLD AUTO: 0.1 % — SIGNIFICANT CHANGE UP (ref 0–2)
BILIRUB SERPL-MCNC: 0.2 MG/DL — SIGNIFICANT CHANGE UP (ref 0.2–1.2)
BUN SERPL-MCNC: 8 MG/DL — SIGNIFICANT CHANGE UP (ref 7–23)
CALCIUM SERPL-MCNC: 6.8 MG/DL — LOW (ref 8.4–10.5)
CHLORIDE SERPL-SCNC: 101 MMOL/L — SIGNIFICANT CHANGE UP (ref 98–107)
CO2 SERPL-SCNC: 24 MMOL/L — SIGNIFICANT CHANGE UP (ref 22–31)
CREAT SERPL-MCNC: 0.9 MG/DL — SIGNIFICANT CHANGE UP (ref 0.5–1.3)
EGFR: 86 ML/MIN/1.73M2 — SIGNIFICANT CHANGE UP
EOSINOPHIL # BLD AUTO: 0.01 K/UL — SIGNIFICANT CHANGE UP (ref 0–0.5)
EOSINOPHIL NFR BLD AUTO: 0.1 % — SIGNIFICANT CHANGE UP (ref 0–6)
GLUCOSE SERPL-MCNC: 81 MG/DL — SIGNIFICANT CHANGE UP (ref 70–99)
HCT VFR BLD CALC: 34.1 % — LOW (ref 34.5–45)
HGB BLD-MCNC: 11.4 G/DL — LOW (ref 11.5–15.5)
IANC: 10.57 K/UL — HIGH (ref 1.8–7.4)
IMM GRANULOCYTES NFR BLD AUTO: 0.5 % — SIGNIFICANT CHANGE UP (ref 0–0.9)
INR BLD: 0.91 RATIO — SIGNIFICANT CHANGE UP (ref 0.88–1.16)
LDH SERPL L TO P-CCNC: 365 U/L — HIGH (ref 135–225)
LYMPHOCYTES # BLD AUTO: 1.8 K/UL — SIGNIFICANT CHANGE UP (ref 1–3.3)
LYMPHOCYTES # BLD AUTO: 13.7 % — SIGNIFICANT CHANGE UP (ref 13–44)
MAGNESIUM SERPL-MCNC: 6 MG/DL — HIGH (ref 1.6–2.6)
MAGNESIUM SERPL-MCNC: 6.5 MG/DL — HIGH (ref 1.6–2.6)
MCHC RBC-ENTMCNC: 28.1 PG — SIGNIFICANT CHANGE UP (ref 27–34)
MCHC RBC-ENTMCNC: 33.4 GM/DL — SIGNIFICANT CHANGE UP (ref 32–36)
MCV RBC AUTO: 84 FL — SIGNIFICANT CHANGE UP (ref 80–100)
MONOCYTES # BLD AUTO: 0.66 K/UL — SIGNIFICANT CHANGE UP (ref 0–0.9)
MONOCYTES NFR BLD AUTO: 5 % — SIGNIFICANT CHANGE UP (ref 2–14)
NEUTROPHILS # BLD AUTO: 10.57 K/UL — HIGH (ref 1.8–7.4)
NEUTROPHILS NFR BLD AUTO: 80.6 % — HIGH (ref 43–77)
NRBC # BLD: 0 /100 WBCS — SIGNIFICANT CHANGE UP (ref 0–0)
NRBC # FLD: 0 K/UL — SIGNIFICANT CHANGE UP (ref 0–0)
PLATELET # BLD AUTO: 175 K/UL — SIGNIFICANT CHANGE UP (ref 150–400)
POTASSIUM SERPL-MCNC: 4.4 MMOL/L — SIGNIFICANT CHANGE UP (ref 3.5–5.3)
POTASSIUM SERPL-SCNC: 4.4 MMOL/L — SIGNIFICANT CHANGE UP (ref 3.5–5.3)
PROT SERPL-MCNC: 6.4 G/DL — SIGNIFICANT CHANGE UP (ref 6–8.3)
PROTHROM AB SERPL-ACNC: 10.5 SEC — SIGNIFICANT CHANGE UP (ref 10.5–13.4)
RBC # BLD: 4.06 M/UL — SIGNIFICANT CHANGE UP (ref 3.8–5.2)
RBC # FLD: 16 % — HIGH (ref 10.3–14.5)
SODIUM SERPL-SCNC: 134 MMOL/L — LOW (ref 135–145)
URATE SERPL-MCNC: 6.9 MG/DL — SIGNIFICANT CHANGE UP (ref 2.5–7)
WBC # BLD: 13.11 K/UL — HIGH (ref 3.8–10.5)
WBC # FLD AUTO: 13.11 K/UL — HIGH (ref 3.8–10.5)

## 2022-11-27 RX ORDER — TETANUS TOXOID, REDUCED DIPHTHERIA TOXOID AND ACELLULAR PERTUSSIS VACCINE, ADSORBED 5; 2.5; 8; 8; 2.5 [IU]/.5ML; [IU]/.5ML; UG/.5ML; UG/.5ML; UG/.5ML
0.5 SUSPENSION INTRAMUSCULAR ONCE
Refills: 0 | Status: COMPLETED | OUTPATIENT
Start: 2022-11-27 | End: 2022-11-27

## 2022-11-27 RX ORDER — SODIUM CHLORIDE 0.65 %
1 AEROSOL, SPRAY (ML) NASAL
Refills: 0 | Status: DISCONTINUED | OUTPATIENT
Start: 2022-11-27 | End: 2022-11-28

## 2022-11-27 RX ORDER — IBUPROFEN 200 MG
600 TABLET ORAL EVERY 6 HOURS
Refills: 0 | Status: DISCONTINUED | OUTPATIENT
Start: 2022-11-27 | End: 2022-11-28

## 2022-11-27 RX ORDER — SODIUM CHLORIDE 9 MG/ML
1000 INJECTION, SOLUTION INTRAVENOUS
Refills: 0 | Status: DISCONTINUED | OUTPATIENT
Start: 2022-11-27 | End: 2022-11-27

## 2022-11-27 RX ORDER — SENNA PLUS 8.6 MG/1
2 TABLET ORAL AT BEDTIME
Refills: 0 | Status: DISCONTINUED | OUTPATIENT
Start: 2022-11-27 | End: 2022-11-28

## 2022-11-27 RX ADMIN — Medication 600 MILLIGRAM(S): at 19:10

## 2022-11-27 RX ADMIN — SODIUM CHLORIDE 3 MILLILITER(S): 9 INJECTION INTRAMUSCULAR; INTRAVENOUS; SUBCUTANEOUS at 14:22

## 2022-11-27 RX ADMIN — Medication 30 MILLIGRAM(S): at 15:31

## 2022-11-27 RX ADMIN — TETANUS TOXOID, REDUCED DIPHTHERIA TOXOID AND ACELLULAR PERTUSSIS VACCINE, ADSORBED 0.5 MILLILITER(S): 5; 2.5; 8; 8; 2.5 SUSPENSION INTRAMUSCULAR at 23:10

## 2022-11-27 RX ADMIN — SENNA PLUS 2 TABLET(S): 8.6 TABLET ORAL at 23:00

## 2022-11-27 RX ADMIN — Medication 1 TABLET(S): at 11:36

## 2022-11-27 RX ADMIN — Medication 1 SPRAY(S): at 15:36

## 2022-11-27 RX ADMIN — Medication 600 MILLIGRAM(S): at 11:37

## 2022-11-27 RX ADMIN — SODIUM CHLORIDE 3 MILLILITER(S): 9 INJECTION INTRAMUSCULAR; INTRAVENOUS; SUBCUTANEOUS at 22:00

## 2022-11-27 RX ADMIN — Medication 600 MILLIGRAM(S): at 12:37

## 2022-11-27 RX ADMIN — Medication 975 MILLIGRAM(S): at 21:30

## 2022-11-27 RX ADMIN — SODIUM CHLORIDE 3 MILLILITER(S): 9 INJECTION INTRAMUSCULAR; INTRAVENOUS; SUBCUTANEOUS at 05:32

## 2022-11-27 RX ADMIN — Medication 975 MILLIGRAM(S): at 22:15

## 2022-11-27 RX ADMIN — Medication 975 MILLIGRAM(S): at 15:31

## 2022-11-27 RX ADMIN — SENNA PLUS 2 TABLET(S): 8.6 TABLET ORAL at 02:26

## 2022-11-27 RX ADMIN — Medication 37.5 GM/HR: at 08:21

## 2022-11-27 RX ADMIN — Medication 600 MILLIGRAM(S): at 18:37

## 2022-11-27 RX ADMIN — Medication 975 MILLIGRAM(S): at 16:00

## 2022-11-27 RX ADMIN — Medication 60 MILLIGRAM(S): at 06:30

## 2022-11-27 NOTE — PROVIDER CONTACT NOTE (OTHER) - ASSESSMENT
VSS , pt denies HA, epigastric pain, blurry vision
Pt asymptomatic. Denies headache, dizziness, SOB, chest pain.

## 2022-11-27 NOTE — PROGRESS NOTE ADULT - SUBJECTIVE AND OBJECTIVE BOX
R1 Progress Note    Patient seen and examined at bedside, no acute overnight events. No acute complaints, pain well controlled. Patient is ambulating, passing gas, and tolerating regular diet. Last night she had a bowel movement. Monge is still in place. Denies CP, SOB, N/V, HA, blurred vision, RUQ pain. Bleeding minimal.    Vital Signs Last 24 Hours  T(C): 36.7 (11-27-22 @ 06:28), Max: 37 (11-26-22 @ 20:08)  HR: 67 (11-27-22 @ 06:28) (64 - 94)  BP: 131/70 (11-27-22 @ 06:28) (111/59 - 169/99)  RR: 16 (11-27-22 @ 06:28) (16 - 18)  SpO2: 97% (11-27-22 @ 06:28) (85% - 100%)    Physical Exam:  General: NAD  Abdomen: Soft, non-tender, non-distended, fundus firm  Pelvic: Lochia wnl    Labs:    Blood Type: A Positive  Antibody Screen: Positive  RPR: Negative               11.4   13.11 )-----------( 175      ( 11-27 @ 06:18 )             34.1                12.4   15.18 )-----------( 183      ( 11-26 @ 17:15 )             36.4                13.4   14.23 )-----------( 187      ( 11-26 @ 11:30 )             40.3         MEDICATIONS  (STANDING):  acetaminophen     Tablet .. 975 milliGRAM(s) Oral <User Schedule>  diphtheria/tetanus/pertussis (acellular) Vaccine (Adacel) 0.5 milliLiter(s) IntraMuscular once  ibuprofen  Tablet. 600 milliGRAM(s) Oral every 6 hours  lactated ringers. 1000 milliLiter(s) (62.5 mL/Hr) IV Continuous <Continuous>  magnesium sulfate Infusion 1.5 Gm/Hr (37.5 mL/Hr) IV Continuous <Continuous>  NIFEdipine XL 60 milliGRAM(s) Oral daily  NIFEdipine XL 30 milliGRAM(s) Oral <User Schedule>  prenatal multivitamin 1 Tablet(s) Oral daily  senna 2 Tablet(s) Oral at bedtime  sodium chloride 0.9% lock flush 3 milliLiter(s) IV Push every 8 hours    MEDICATIONS  (PRN):  benzocaine 20%/menthol 0.5% Spray 1 Spray(s) Topical every 6 hours PRN for Perineal discomfort  dibucaine 1% Ointment 1 Application(s) Topical every 6 hours PRN Perineal discomfort  diphenhydrAMINE 25 milliGRAM(s) Oral every 6 hours PRN Pruritus  hydrocortisone 1% Cream 1 Application(s) Topical every 6 hours PRN Moderate Pain (4-6)  lanolin Ointment 1 Application(s) Topical every 6 hours PRN nipple soreness  magnesium hydroxide Suspension 30 milliLiter(s) Oral two times a day PRN Constipation  oxyCODONE    IR 5 milliGRAM(s) Oral every 3 hours PRN Moderate to Severe Pain (4-10)  oxyCODONE    IR 5 milliGRAM(s) Oral once PRN Moderate to Severe Pain (4-10)  pramoxine 1%/zinc 5% Cream 1 Application(s) Topical every 4 hours PRN Moderate Pain (4-6)  simethicone 80 milliGRAM(s) Chew every 4 hours PRN Gas  witch hazel Pads 1 Application(s) Topical every 4 hours PRN Perineal discomfort

## 2022-11-27 NOTE — PROVIDER CONTACT NOTE (OTHER) - BACKGROUND
vaginal delivery 11/26/2022 at 0945
33 week  pt who came in with elevated BPs from home and has no IV access is refusing IV and labs drawn. Primary RN tried to put one in and L&D RN came in and attempted to put one in. pt refused

## 2022-11-27 NOTE — PROGRESS NOTE ADULT - SUBJECTIVE AND OBJECTIVE BOX
Patient seen and examined at bedside, resting comfortably in no acute distress. Denies fever, chills, nausea or vomiting. She is tolerating a regular diet. She is not ambulating and morgan is in place. Pain is well controlled. Bleeding is less than a normal menses.    Physical Examination:  Vital Signs: Vital Signs Last 24 Hrs  T(C): 36.7 (2022 06:28), Max: 37 (2022 20:08)  T(F): 98.1 (2022 06:28), Max: 98.6 (2022 20:08)  HR: 67 (2022 06:28) (64 - 94)  BP: 131/70 (2022 06:28) (111/59 - 169/99)  BP(mean): --  RR: 16 (2022 06:28) (16 - 18)  SpO2: 97% (2022 06:28) (85% - 100%)    Parameters below as of 2022 17:30  Patient On (Oxygen Delivery Method): room air      General: alert and oriented, no acute distress  Cardio: regular rate and rhythm  Respiratory: non labored respirations  Abdomen: soft, non-tender, non-distended, uterus firm, palpated below the umbilicus  VE: minimal lochia noted  Musculoskeletal: No erythema/edema, no calf tenderness bilaterally    MEDICATIONS  (STANDING):  acetaminophen     Tablet .. 975 milliGRAM(s) Oral <User Schedule>  diphtheria/tetanus/pertussis (acellular) Vaccine (Adacel) 0.5 milliLiter(s) IntraMuscular once  ibuprofen  Tablet. 600 milliGRAM(s) Oral every 6 hours  lactated ringers. 1000 milliLiter(s) (62.5 mL/Hr) IV Continuous <Continuous>  magnesium sulfate Infusion 1.5 Gm/Hr (37.5 mL/Hr) IV Continuous <Continuous>  NIFEdipine XL 60 milliGRAM(s) Oral daily  NIFEdipine XL 30 milliGRAM(s) Oral <User Schedule>  prenatal multivitamin 1 Tablet(s) Oral daily  senna 2 Tablet(s) Oral at bedtime  sodium chloride 0.9% lock flush 3 milliLiter(s) IV Push every 8 hours      Labs:  Blood type: A Positive  Rubella IgG: RPR: Negative                          12.4   15.18<H> >-----------< 183    (  17:15 )             36.4                        13.4   14.23<H> >-----------< 187    (  11:30 )             40.3                        13.5   10.74<H> >-----------< 163    (  00:00 )             41.2                        13.6   9.51 >-----------< 173    (  18:00 )             39.9                        14.0   8.35 >-----------< 186    (  11:40 )             42.2                        12.0   8.15 >-----------< 171    (  @ 05:58 )             36.7    22 17:15      131<L>  |  98  |  8   ----------------------------<  115<H>  4.1   |  19<L>  |  0.90    22 11:30      130<L>  |  98  |  7   ----------------------------<  109<H>  5.2   |  21<L>  |  0.86    22 00:00      128<L>  |  97<L>  |  7   ----------------------------<  100<H>  4.2   |  17<L>  |  0.76    22 @ 18:00      130<L>  |  100  |  6<L>  ----------------------------<  89  4.2   |  18<L>  |  0.80    22 @ 11:40      129<L>  |  99  |  8   ----------------------------<  84  4.2   |  18<L>  |  0.83    22 @ 05:58      136  |  105  |  9   ----------------------------<  97  4.0   |  19<L>  |  0.89        Ca    7.1<L>      2022 17:15  Ca    7.8<L>      2022 11:30  Ca    7.5<L>      2022 00:00  Ca    8.4      2022 18:00  Ca    9.2      2022 11:40  Ca    9.5      2022 05:58  Mg     6.00<H>     11-26  Mg     6.40<H>     11-26  Mg     6.70<H>     11-26  Mg     6.60<H>     11-26  Mg     6.60<H>     11-26  Mg     6.80<H>     11-25  Mg     6.20<H>         TPro  6.5  /  Alb  3.0<L>  /  TBili  0.2  /  DBili  x   /  AST  35<H>  /  ALT  22  /  AlkPhos  127<H>  22 @ 17:15  TPro  7.5  /  Alb  3.5  /  TBili  0.3  /  DBili  x   /  AST  37<H>  /  ALT  22  /  AlkPhos  157<H>  22 @ 11:30  TPro  7.4  /  Alb  3.5  /  TBili  0.3  /  DBili  x   /  AST  37<H>  /  ALT  24  /  AlkPhos  155<H>  22 @ 00:00  TPro  7.6  /  Alb  3.5  /  TBili  0.2  /  DBili  x   /  AST  30  /  ALT  20  /  AlkPhos  159<H>  22 @ 18:00  TPro  8.2  /  Alb  3.9  /  TBili  0.2  /  DBili  x   /  AST  29  /  ALT  20  /  AlkPhos  166<H>  22 @ 11:40  TPro  6.7  /  Alb  3.1<L>  /  TBili  <0.2  /  DBili  x   /  AST  26  /  ALT  16  /  AlkPhos  139<H>  22 @ 05:58      Assessment and Plan:   33yo P1 s/p  on  now PPD#1 .  Patient is stable and doing well post-partum.   Blood type: A+    PEC with severe features  - on mag sulfate  - HELLP labs wnl  - received procardia 10mg IR last night  - continue procardia 60mg AM and 30mg PM    Postpartum bleeding  - QBL 151cc for delivery and 250cc postpartum  - received cytotec 1000mcg LA  - H/H, VSS    Plan:  - VS per unit protocol  - SCDs for DVT ppx  - Regular diet  - Pain well controlled, continue current pain regimen  - Encourage ambulation  - Discharge instructions reviewed  - D/c planning initiated, patient to follow up in office in 6 weeks for post partum visit    Senthil Dorantes MD

## 2022-11-28 VITALS
TEMPERATURE: 98 F | SYSTOLIC BLOOD PRESSURE: 121 MMHG | OXYGEN SATURATION: 99 % | HEART RATE: 69 BPM | RESPIRATION RATE: 18 BRPM | DIASTOLIC BLOOD PRESSURE: 73 MMHG

## 2022-11-28 RX ORDER — NIFEDIPINE 30 MG
1 TABLET, EXTENDED RELEASE 24 HR ORAL
Qty: 0 | Refills: 0 | DISCHARGE

## 2022-11-28 RX ORDER — NIFEDIPINE 30 MG
1 TABLET, EXTENDED RELEASE 24 HR ORAL
Qty: 30 | Refills: 0
Start: 2022-11-28 | End: 2022-12-27

## 2022-11-28 RX ADMIN — Medication 30 MILLIGRAM(S): at 16:42

## 2022-11-28 RX ADMIN — Medication 600 MILLIGRAM(S): at 04:45

## 2022-11-28 RX ADMIN — SODIUM CHLORIDE 3 MILLILITER(S): 9 INJECTION INTRAMUSCULAR; INTRAVENOUS; SUBCUTANEOUS at 14:00

## 2022-11-28 RX ADMIN — Medication 600 MILLIGRAM(S): at 17:15

## 2022-11-28 RX ADMIN — Medication 60 MILLIGRAM(S): at 06:05

## 2022-11-28 RX ADMIN — SODIUM CHLORIDE 3 MILLILITER(S): 9 INJECTION INTRAMUSCULAR; INTRAVENOUS; SUBCUTANEOUS at 06:00

## 2022-11-28 RX ADMIN — Medication 600 MILLIGRAM(S): at 09:41

## 2022-11-28 RX ADMIN — Medication 600 MILLIGRAM(S): at 04:00

## 2022-11-28 RX ADMIN — Medication 1 TABLET(S): at 09:41

## 2022-11-28 RX ADMIN — Medication 600 MILLIGRAM(S): at 10:11

## 2022-11-28 RX ADMIN — Medication 600 MILLIGRAM(S): at 16:45

## 2022-11-28 NOTE — PROGRESS NOTE ADULT - ATTENDING SUPERVISION STATEMENT
Resident

## 2022-11-28 NOTE — PROGRESS NOTE ADULT - ASSESSMENT
31y/o  PPD#1 from  c/b sPEC/Mg, PTD@34w1d, and PPH/SHIRA atony in stable condition. Overall, patient recovering well postpartum.    #sPEC/Mg  - d/c Mg @10a  - q6 Mg checks  - BPs ON well controlled  - denies severe features   - baseline HELLP labs wnl, P/C 0.3; repeat prn   - Scheduled for Procardia 60AM/30PM. Last night patient refused PM dose of Procardia.  - continue to monitor  - f/u AM HELLP labs    #PPH/SHIRA atony  - +250  - Hct 40.2->36.4  - s/p cytotec 1000mg OR    #Postpartum state  - Continue with po analgesia  - Increase ambulation  - Continue regular diet  - IV lock    Haley Vasques  PGY-1
31yo  at 32w1d admitted with sPEC s/p Mg and mild headache now resolved. Patient is stable at this time.     #sPEC  - s/p Mg for seizure ppx (11/10-)  - s/p Procardia 10 IVP (11/10)  - Procardia 30XL for BP control  - HELLP wnl, PC 0.3, f/u AM HELLP labs  - Monitor BPs  - 24h urine (): 240  - 24 hour urine protein    #IUGR  - ATU(): vtx, posterior placenta, MAY 19.84cm, EFW 1429 (2%), AC < 1%, BPP 8/8, UAD wnl    #Fetal wellbeing  - s/p BMZ (11/10-)  - continuous fetal monitoring then NT BID  - NICU consult prn   - f/u GBS    #Maternal wellbeing  - Regular diet  - HSQ/SCDs for DVT ppx  - PNV/Iron/Colace/Folic acid  - f/u UCx    Melina Knott PGY3  
31yo  at 32w3d admitted with sPEC s/p Mg and mild headache now resolved. Patient with severe range BPs yesterday requiring Pro 10/10 IR + Lab 20 and increase in standing Procardia (30->60XL). BPs now improved, patient with no acute complaints.    #sPEC  - s/p Mg for seizure ppx (11/10-)  - s/p Procardia 10 IR (11/10), 10/10(), Lab 20 IVP()   - Procardia 60XL for BP control (up titrated )  - HELLP wnl, P/C 0.3, HELLP labs q3d   - Monitor BPs  - 24h urine (): 240  - 24 hour urine protein    #IUGR  - ATU(): vtx, posterior placenta, MAY 19.84cm, EFW 1429 (2%), AC < 1%, BPP 8/8, UAD wnl    #Fetal wellbeing  - s/p BMZ (11/10-)  - NST BID  - NICU consult prn     #Maternal wellbeing  - Regular diet  - HSQ/SCDs for DVT ppx  - PNV/Iron/Senna/Folic acid    Verena Davis PGY-3
31yo  at 33w3d admitted with sPEC s/p Mg and mild headache now resolved. Patient with no acute complaints overnight, BPs wnl. Patient stable with no acute complaints this morning.     #sPEC  - s/p Mg for seizure ppx (11/10-)  - s/p Procardia 10 IR (11/10), 10/10(), Lab 20 IVP()   - Procardia 60XL, [id number] for BP control (up titrated 11/15)  - HELLP wnl, P/C 0.3, HELLP labs q3d   - Monitor BPs  - 24h urine (): 240    #IUGR  - ATU(): vtx, posterior placenta, .54cm, BPP8/8, UAD wnl  - ATU(11/15): vtx, posterior placenta, MAY 14.47cm, BPP8/8, UAD wnl  - ATU(): vtx, posterior placenta, MAY 19.84cm, EFW 1429 (2%), AC < 1%, BPP 8/8, UAD wnl    #Anti-M  - Active T&S  - Limited blood available per blood bank    #Fetal wellbeing  - s/p BMZ (11/10-)  - NST BID  - NICU consult completed     #Maternal wellbeing  - Regular diet  - HSQ/SCDs for DVT ppx  - PNV/Iron/Senna/Folic acid    Dispo: plan for delivery @34w unless otherwise indicated with worsening sPEC     Gisel Hinds, PGY-3  
31yo  at 33w4d admitted with sPEC s/p Mg and mild headache now resolved. Patient with no acute complaints overnight, BPs wnl. Patient stable with no acute complaints this morning.     #sPEC  - s/p Mg for seizure ppx (11/10-)  - s/p Procardia 10 IR (11/10), 10/10(), Lab 20 IVP()   - Procardia 60XL, [id number] for BP control (up titrated 11/15)  - HELLP wnl, P/C 0.3, HELLP labs qd   - Monitor BPs  - 24h urine (): 240    #IUGR  - ATU(): vtx, posterior placenta, .54cm, BPP8/8, UAD wnl  - ATU(11/15): vtx, posterior placenta, MAY 14.47cm, BPP8/8, UAD wnl  - ATU(): vtx, posterior placenta, MAY 19.84cm, EFW 1429 (2%), AC < 1%, BPP 8/8, UAD wnl    #Anti-M  - Active T&S  - Limited blood available per blood bank    #Fetal wellbeing  - s/p BMZ (11/10-)  - NST BID  - NICU consult completed     #Maternal wellbeing  - Regular diet  - HSQ/SCDs for DVT ppx  - PNV/Iron/Senna/Folic acid    Dispo: plan for delivery @34w unless otherwise indicated with worsening sPEC     Gisel Hinds, PGY-3
33yo  at 32w6d admitted with sPEC s/p Mg and mild headache now resolved. Patient with no acute complaints overnight, BPs wnl.    #sPEC  - s/p Mg for seizure ppx (11/10-)  - s/p Procardia 10 IR (11/10), 10/10(), Lab 20 IVP()   - Procardia 60XL, [id number] for BP control (up titrated 11/15)  - HELLP wnl, P/C 0.3, HELLP labs q3d   - Monitor BPs  - 24h urine (): 240    #IUGR  - ATU(11/15): vtx, posterior placenta, MAY 14.47cm, BPP8/8, UAD wnl  - ATU(): vtx, posterior placenta, MAY 19.84cm, EFW 1429 (2%), AC < 1%, BPP 8/8, UAD wnl    #Fetal wellbeing  - s/p BMZ (11/10-)  - NST BID  - NICU consult completed     #Maternal wellbeing  - Regular diet  - HSQ/SCDs for DVT ppx  - PNV/Iron/Senna/Folic acid    Verena Davis PGY-3  
33yo  at 33w admitted with sPEC s/p Mg and mild headache now resolved. Patient with no acute complaints overnight, BPs wnl. Patient requiring IV replacement and initially declined, now amenable.    #sPEC  - s/p Mg for seizure ppx (11/10-)  - s/p Procardia 10 IR (11/10), 10/10(), Lab 20 IVP()   - Procardia 60XL, [id number] for BP control (up titrated 11/15)  - HELLP wnl, P/C 0.3, HELLP labs q3d   - Monitor BPs  - 24h urine (): 240    #IUGR  - ATU(11/15): vtx, posterior placenta, MAY 14.47cm, BPP8/8, UAD wnl  - ATU(): vtx, posterior placenta, MAY 19.84cm, EFW 1429 (2%), AC < 1%, BPP 8/8, UAD wnl    #Fetal wellbeing  - s/p BMZ (11/10-)  - NST BID  - NICU consult completed     #Maternal wellbeing  - Regular diet  - HSQ/SCDs for DVT ppx  - PNV/Iron/Senna/Folic acid    Dispo: plan for delivery @34w unless otherwise indicated with worsening sPEC     Verena Davis PGY-3    
33yo  at 33w1d admitted with sPEC s/p Mg and mild headache now resolved. Patient with no acute complaints overnight, BPs wnl. Patient requiring IV replacement and initially declined, now amenable. Patient stable with no acute complaints this morning.     #sPEC  - s/p Mg for seizure ppx (11/10-)  - s/p Procardia 10 IR (11/10), 10/10(), Lab 20 IVP()   - Procardia 60XL, [id number] for BP control (up titrated 11/15)  - HELLP wnl, P/C 0.3, HELLP labs q3d   - Monitor BPs  - 24h urine (): 240    #IUGR  - ATU(11/15): vtx, posterior placenta, MAY 14.47cm, BPP8/8, UAD wnl  - ATU(): vtx, posterior placenta, MAY 19.84cm, EFW 1429 (2%), AC < 1%, BPP 8/8, UAD wnl    #Anti-M  - Active T&S  - Limited blood available per blood bank    #Fetal wellbeing  - s/p BMZ (11/10-)  - NST BID  - NICU consult completed     #Maternal wellbeing  - Regular diet  - HSQ/SCDs for DVT ppx  - PNV/Iron/Senna/Folic acid    Dispo: plan for delivery @34w unless otherwise indicated with worsening sPEC     Melina Knott PGY-3      
33yo  at 33w5d admitted with sPEC s/p Mg and mild headache now resolved. Patient with no acute complaints overnight, BPs wnl. Patient stable with no acute complaints this morning.     #sPEC  - s/p Mg for seizure ppx (11/10-)  - s/p Procardia 10 IR (11/10), 10/10(), Lab 20 IVP()   - Procardia 60XL, [id number] for BP control (up titrated 11/15)  - HELLP wnl, P/C 0.3, HELLP labs qd   - Monitor BPs  - 24h urine (): 240    #IUGR  - ATU(): vtx, posterior placenta, .54cm, BPP8/8, UAD wnl  - ATU(11/15): vtx, posterior placenta, MAY 14.47cm, BPP8/8, UAD wnl  - ATU(): vtx, posterior placenta, MAY 19.84cm, EFW 1429 (2%), AC < 1%, BPP 8/8, UAD wnl    #Anti-M  - Active T&S  - Limited blood available per blood bank    #Fetal wellbeing  - s/p BMZ (11/10-)  - NST BID  - NICU consult completed     #Maternal wellbeing  - Regular diet  - HSQ/SCDs for DVT ppx  - PNV/Iron/Senna/Folic acid    Dispo: plan for delivery @34w unless otherwise indicated with worsening severe preeclampsia    Kalpana Jean PGY3
Dispo: plan for delivery @34w unless otherwise indicated with worsening severe preeclampsia Patient with no acute complaints overnight, BPs wnl. Patient stable with no acute complaints this morning.     #sPEC  - s/p Mg for seizure ppx (11/10-11/11)  - s/p Procardia 10 IR (11/10), 10/10(11/13), Lab 20 IVP(11/13)   - Procardia 60XL, [id number] for BP control (up titrated 11/15)  - HELLP wnl, P/C 0.3, HELLP labs qd   - Monitor BPs  - 24h urine (11/1): 240    #IUGR  - ATU(11/18): vtx, posterior placenta, .54cm, BPP8/8, UAD wnl  - ATU(11/15): vtx, posterior placenta, MAY 14.47cm, BPP8/8, UAD wnl  - ATU(11/11): vtx, posterior placenta, MAY 19.84cm, EFW 1429 (2%), AC < 1%, BPP 8/8, UAD wnl    #Anti-M  - Active T&S  - Limited blood available per blood bank    #Fetal wellbeing  - s/p BMZ (11/10-11/11)  - NST BID  - NICU consult completed 11/16    #Maternal wellbeing  - Regular diet  - HSQ/SCDs for DVT ppx  - PNV/Iron/Senna/Folic acid    Dispo: plan for delivery @34w unless otherwise indicated with worsening sPEC       Sierra Ordoñez, PGY-3
31yo  at 32w4d admitted with sPEC s/p Mg and mild headache now resolved. BPs now improved, patient with no acute complaints.    #sPEC  - s/p Mg for seizure ppx (11/10-)  - s/p Procardia 10 IR (11/10), 10/10(), Lab 20 IVP()   - Procardia 60XL for BP control (up titrated )  - HELLP wnl, P/C 0.3, HELLP labs q3d   - Monitor BPs  - 24h urine (): 240  - 24 hour urine protein    #IUGR  - ATU(): vtx, posterior placenta, MAY 19.84cm, EFW 1429 (2%), AC < 1%, BPP , UAD wnl    #Fetal wellbeing  - s/p BMZ (11/10-)  - NST BID  - NICU consult prn     #Maternal wellbeing  - Regular diet  - HSQ/SCDs for DVT ppx  - PNV/Iron/Senna/Folic acid    Verena Davis PGY-3
33yo  at 32w admitted with sPEC on Mg and mild headache. Patient is stable at this time.     #sPEC  - Mg for seizure ppx (11/10-)  - s/p Procardia 10 IVP (11/10)  - Procardia 30XL for BP control  - HELLP wnl, PC 0.3, f/u AM HELLP labs  - Monitor BPs  - 24h urine (): 240  - 24 hour urine protein  - BMZ for FLM    #Fetal wellbeing  - BMZ (11/10-)  - continuous fetal monitoring then NT BID  - NICU consult prn   - f/u GBS    #Maternal wellbeing  - Regular diet  - HSQ/SCDs for DVT ppx  - PNV/Iron/Colace/Folic acid  - f/u UCx    Melina Tarmark PGY3    
33yo  at 32w2d admitted with sPEC s/p Mg and mild headache now resolved. Patient is stable at this time.     #sPEC  - s/p Mg for seizure ppx (11/10-)  - s/p Procardia 10 IVP (11/10)  - Procardia 30XL for BP control  - HELLP wnl, P/C 0.3, HELLP labs q3d   - Monitor BPs  - 24h urine (): 240  - 24 hour urine protein    #IUGR  - ATU(): vtx, posterior placenta, MAY 19.84cm, EFW 1429 (2%), AC < 1%, BPP 8/8, UAD wnl    #Fetal wellbeing  - s/p BMZ (11/10-)  - NST BID  - NICU consult prn     #Maternal wellbeing  - Regular diet  - HSQ/SCDs for DVT ppx  - PNV/Iron/Senna/Folic acid    Verena Davis PGY-3  
33yo  at 32w5d admitted with sPEC s/p Mg and mild headache now resolved. Patient with elevated BPs yesterday so additional Procardia 30 XL added to regimen, BPs now improved    #sPEC  - s/p Mg for seizure ppx (11/10-)  - s/p Procardia 10 IR (11/10), 10/10(), Lab 20 IVP()   - Procardia 60XL, [id number] for BP control (up titrated 11/15)  - HELLP wnl, P/C 0.3, HELLP labs q3d   - Monitor BPs  - 24h urine (): 240    #IUGR  - ATU(11/15): vtx, posterior placenta, MAY 14.47cm, BPP8/8, UAD wnl  - ATU(): vtx, posterior placenta, MAY 19.84cm, EFW 1429 (2%), AC < 1%, BPP 8/8, UAD wnl    #Fetal wellbeing  - s/p BMZ (11/10-)  - NST BID  - NICU consult prn     #Maternal wellbeing  - Regular diet  - HSQ/SCDs for DVT ppx  - PNV/Iron/Senna/Folic acid    Verena Davis PGY-3  
A/P: 32y PPD#2 s/p  c/b PEC w/ SF (s/p Mg) c/b post-partum bleeding of 250cc. Patient is stable and doing well post-partum.    - Pain well controlled, continue current pain regimen  - Increase ambulation  - Continue regular diet    #PEC w/ SF  - s/p Mg   - Continue w/ Nifedipine 60mg qAM/30mg qPM  - BPs reassuring 110-120s/60-70s  - Asx    Orlin Baird, PGY-1  Ob/Gyn
Dispo: plan for delivery @34w unless otherwise indicated with worsening severe preeclampsia Patient with no acute complaints overnight, BPs wnl. Patient stable with no acute complaints this morning.     #sPEC  - s/p Mg for seizure ppx (11/10-11/11)  - s/p Procardia 10 IR (11/10), 10/10(11/13), Lab 20 IVP(11/13)   - Procardia 60XL, [id number] for BP control (up titrated 11/15)  - HELLP wnl, P/C 0.3, HELLP labs q3d   - Monitor BPs  - 24h urine (11/1): 240    #IUGR  - ATU(11/15): vtx, posterior placenta, MAY 14.47cm, BPP8/8, UAD wnl  - ATU(11/11): vtx, posterior placenta, MAY 19.84cm, EFW 1429 (2%), AC < 1%, BPP 8/8, UAD wnl    #Anti-M  - Active T&S  - Limited blood available per blood bank    #Fetal wellbeing  - s/p BMZ (11/10-11/11)  - NST BID  - NICU consult completed 11/16    #Maternal wellbeing  - Regular diet  - HSQ/SCDs for DVT ppx  - PNV/Iron/Senna/Folic acid    Dispo: plan for delivery @34w unless otherwise indicated with worsening sPEC     Sierra Ordoñez, PGY-3

## 2022-11-28 NOTE — PROGRESS NOTE ADULT - SUBJECTIVE AND OBJECTIVE BOX
OB Progress Note:  PPD#2    S: 32y PPD#2 s/p  c/b PEC w/ SF (s/p Mg) c/b post-partum bleeding of 250cc. Pain controlled. Patient tolerating regular diet, voiding spontaneously, and ambulating without difficulty. Denies significant vaginal bleeding, chest pain, shortness of breath, light-headedness/ dizziness, or n/v. Denies any headache or scotomas    O:  Vitals:   Vital Signs Last 24 Hrs  T(C): 37 (2022 05:54), Max: 37.1 (2022 15:29)  T(F): 98.6 (2022 05:54), Max: 98.8 (2022 15:29)  HR: 71 (2022 05:54) (71 - 83)  BP: 124/70 (2022 05:54) (119/66 - 128/68)  BP(mean): --  RR: 18 (2022 05:54) (16 - 18)  SpO2: 100% (2022 05:54) (98% - 100%)    Parameters below as of 2022 05:54  Patient On (Oxygen Delivery Method): room air        MEDICATIONS  (STANDING):  acetaminophen     Tablet .. 975 milliGRAM(s) Oral <User Schedule>  ibuprofen  Tablet. 600 milliGRAM(s) Oral every 6 hours  NIFEdipine XL 60 milliGRAM(s) Oral daily  NIFEdipine XL 30 milliGRAM(s) Oral <User Schedule>  prenatal multivitamin 1 Tablet(s) Oral daily  senna 2 Tablet(s) Oral at bedtime  sodium chloride 0.9% lock flush 3 milliLiter(s) IV Push every 8 hours    MEDICATIONS  (PRN):  benzocaine 20%/menthol 0.5% Spray 1 Spray(s) Topical every 6 hours PRN for Perineal discomfort  dibucaine 1% Ointment 1 Application(s) Topical every 6 hours PRN Perineal discomfort  diphenhydrAMINE 25 milliGRAM(s) Oral every 6 hours PRN Pruritus  hydrocortisone 1% Cream 1 Application(s) Topical every 6 hours PRN Moderate Pain (4-6)  lanolin Ointment 1 Application(s) Topical every 6 hours PRN nipple soreness  magnesium hydroxide Suspension 30 milliLiter(s) Oral two times a day PRN Constipation  oxyCODONE    IR 5 milliGRAM(s) Oral every 3 hours PRN Moderate to Severe Pain (4-10)  oxyCODONE    IR 5 milliGRAM(s) Oral once PRN Moderate to Severe Pain (4-10)  pramoxine 1%/zinc 5% Cream 1 Application(s) Topical every 4 hours PRN Moderate Pain (4-6)  simethicone 80 milliGRAM(s) Chew every 4 hours PRN Gas  sodium chloride 0.65% Nasal 1 Spray(s) Both Nostrils two times a day PRN Nasal Congestion  witch hazel Pads 1 Application(s) Topical every 4 hours PRN Perineal discomfort      Labs:  Blood type: A Positive  Rubella IgG: RPR: Negative                          11.4<L>   13.11<H> >-----------< 175    (  @ 06:18 )             34.1<L>                        12.4   15.18<H> >-----------< 183    (  @ 17:15 )             36.4                        13.4   14.23<H> >-----------< 187    (  @ 11:30 )             40.3                        13.5   10.74<H> >-----------< 163    (  @ 00:00 )             41.2                        13.6   9.51 >-----------< 173    (  @ 18:00 )             39.9                        14.0   8.35 >-----------< 186    (  @ 11:40 )             42.2    22 @ 06:18      134<L>  |  101  |  8   ----------------------------<  81  4.4   |  24  |  0.90    22 @ 17:15      131<L>  |  98  |  8   ----------------------------<  115<H>  4.1   |  19<L>  |  0.90    22 @ 11:30      130<L>  |  98  |  7   ----------------------------<  109<H>  5.2   |  21<L>  |  0.86    22 @ 00:00      128<L>  |  97<L>  |  7   ----------------------------<  100<H>  4.2   |  17<L>  |  0.76    22 @ 18:00      130<L>  |  100  |  6<L>  ----------------------------<  89  4.2   |  18<L>  |  0.80    22 @ 11:40      129<L>  |  99  |  8   ----------------------------<  84  4.2   |  18<L>  |  0.83        Ca    6.8<L>      2022 06:18  Ca    7.1<L>      2022 17:15  Ca    7.8<L>      2022 11:30  Ca    7.5<L>      2022 00:00  Ca    8.4      2022 18:00  Ca    9.2      2022 11:40  Mg     6.50<H>     11-27  Mg     6.00<H>     11-26  Mg     6.40<H>     11-26  Mg     6.70<H>     11-26  Mg     6.60<H>     11-26  Mg     6.60<H>     11-26  Mg     6.80<H>     11-25  Mg     6.20<H>         TPro  6.4  /  Alb  3.1<L>  /  TBili  0.2  /  DBili  x   /  AST  34<H>  /  ALT  23  /  AlkPhos  116  22 @ 06:18  TPro  6.5  /  Alb  3.0<L>  /  TBili  0.2  /  DBili  x   /  AST  35<H>  /  ALT  22  /  AlkPhos  127<H>  22 @ 17:15  TPro  7.5  /  Alb  3.5  /  TBili  0.3  /  DBili  x   /  AST  37<H>  /  ALT  22  /  AlkPhos  157<H>  22 @ 11:30  TPro  7.4  /  Alb  3.5  /  TBili  0.3  /  DBili  x   /  AST  37<H>  /  ALT  24  /  AlkPhos  155<H>  22 @ 00:00  TPro  7.6  /  Alb  3.5  /  TBili  0.2  /  DBili  x   /  AST  30  /  ALT  20  /  AlkPhos  159<H>  22 @ 18:00  TPro  8.2  /  Alb  3.9  /  TBili  0.2  /  DBili  x   /  AST  29  /  ALT  20  /  AlkPhos  166<H>  22 @ 11:40          Physical Exam:  General: No acute distress. Sitting up in bed comfortably   Respiratory: No respiratory distress. Unlabored breathing   Abdomen: Soft. Non-tender. Non-distended. Fundus is firm   Extremities: No pitting edema or calf tenderness bilaterally

## 2022-11-28 NOTE — CHART NOTE - NSCHARTNOTEFT_GEN_A_CORE
Bedside ultrasound performed due to patient concern for retained products. Sono revealed thin endometrial stripe. Patient was reassured after seeing the sono.    Marielos Hess, PGY1  d/w Dr. Dill

## 2022-11-28 NOTE — PROGRESS NOTE ADULT - ATTENDING COMMENTS
Patient seen and examined at bedside, no acute events overnight.  Continues on mag sulfate, procardia 30mg XL. S/p BMZ.  Anticipate delivery at 34 weeks, continue inpatient management.    Senthil Dorantes MD
Patient seen at bedside, no acute events overnight.  Agree with assessment and plan as stated above.  Plan for IOL at 34 weeks.    Senthil Dorantes MD
Pt feels well. Denies sx of PEC.  PLan for IOL at 34wks 11/25
Pt seen and evaluated at bedside  Agree with above   BPs controlled on Procardia 60XL AM and Procardia 30XL PM  Plan for IOL on 11/25  Fetal status reassuring    nichole MAYES
Pt seen and evaluated at bedside  Agree with above  P0 at 32w5d admitted with sPEC s/p Mg  On Procardia 60Xl/30XL for BP control, continue close monitoring  Pt with mild headache this morning, awaiting Tylenol  Requesting to speak with NICU  Plan for IOL at 34 weeks or sooner if indicated    nichole MAYES
Pt seen and evaluated at bedside  Agree with above  sPEC s/p Mag, continue Procardia 60XL in AM, 30XL in PM  IUGR fetus, dopplers WNL  Fetal status reassuring  IOL at 34 weeks    nichole MAYES
patient seen and examined at bedside. agree with above assessment and plan
saw patient and doing well  No complaints. BP well controlled today
Pt seen and evaluated at bedside  Agree with above  P0 at 32w1d admitted with sPEC, pt given Procardia 10 IR upon arrival  s/p MgSo4, on Procardia 30XL  BPs 110-130/50-80s  Pt reports mild headache 2/10 this morning, drinking caffeine now, will continue to monitor  Pt to be in house until delivery    nichole MAYES
Pt seen and evaluated at bedside  Agree with above  P0 at 32w3d admitted with sPEC  Pt with elevated BPs overnight, received Procardia 10/10 and Labetalol 20, Procardia increased to 60XL  Fetus IUGR 2% from 11/11  Continue close BP monitoring    nichole MAYES
Pt seen by me @5pm  Now with severe range BPs.  Remains asymptomatic   sPEC at 32+wks with IUGR  IV antihypertensives  Rpt HELLP labs   NPO for now until BPs controlled
patient seen and examined at bedside. agree with above assessment and plan
Pt feels well  No signs or sx or sPEC  Will start IOL at 34wks 11/25

## 2022-11-29 ENCOUNTER — NON-APPOINTMENT (OUTPATIENT)
Age: 32
End: 2022-11-29

## 2022-12-01 ENCOUNTER — NON-APPOINTMENT (OUTPATIENT)
Age: 32
End: 2022-12-01

## 2022-12-01 ENCOUNTER — APPOINTMENT (OUTPATIENT)
Dept: OBGYN | Facility: CLINIC | Age: 32
End: 2022-12-01

## 2022-12-01 VITALS — HEART RATE: 74 BPM | SYSTOLIC BLOOD PRESSURE: 128 MMHG | DIASTOLIC BLOOD PRESSURE: 83 MMHG

## 2022-12-01 VITALS — SYSTOLIC BLOOD PRESSURE: 140 MMHG | DIASTOLIC BLOOD PRESSURE: 82 MMHG

## 2022-12-01 PROCEDURE — 99211 OFF/OP EST MAY X REQ PHY/QHP: CPT

## 2022-12-04 ENCOUNTER — TRANSCRIPTION ENCOUNTER (OUTPATIENT)
Age: 32
End: 2022-12-04

## 2022-12-04 ENCOUNTER — NON-APPOINTMENT (OUTPATIENT)
Age: 32
End: 2022-12-04

## 2022-12-07 ENCOUNTER — NON-APPOINTMENT (OUTPATIENT)
Age: 32
End: 2022-12-07

## 2022-12-07 ENCOUNTER — APPOINTMENT (OUTPATIENT)
Dept: CARDIOLOGY | Facility: CLINIC | Age: 32
End: 2022-12-07

## 2022-12-07 VITALS
BODY MASS INDEX: 29.41 KG/M2 | OXYGEN SATURATION: 98 % | WEIGHT: 166 LBS | SYSTOLIC BLOOD PRESSURE: 112 MMHG | HEART RATE: 61 BPM | DIASTOLIC BLOOD PRESSURE: 77 MMHG

## 2022-12-07 PROCEDURE — 99214 OFFICE O/P EST MOD 30 MIN: CPT | Mod: 25

## 2022-12-07 PROCEDURE — 93000 ELECTROCARDIOGRAM COMPLETE: CPT

## 2022-12-07 NOTE — REASON FOR VISIT
[FreeTextEntry1] : This is the 2nd  office visit for this 32 -year-old female who experienced chest pains a few weeks ago.  She was under stress at the time.  She was planning her own wedding and found out that a cousin who is of similar age had  of a myocardial infarction.  She went to the [facility] emergency room where they did serial enzymes and EKGs and sent her home after approximately 6 hours.  She now comes for follow-up.\par 2022: The patient was pregnant and developed gestational hypertension.  She was induced and delivered a baby on 2022: She is on nifedipine 60 mg in the morning and 30 mg in the afternoon with a parameter to hold the blood pressure medicine if the systolic is below 112.  She denies any chest pains, shortness of breath, or palpitations.  She is breast-feeding.\par The patient had COVID-19 in 2021.  At the time she lost her sense of taste and smell.  Of note recently she has been having a sensation of smelling smoke when there is no smoke.\par \par The patient has had known fibrocystic disease of the breast and had breast biopsy.\par \par The patient never smoked.  She only has an occasional alcohol averaging 2 or 3 a month.  She has no recreational drugs.  She only has occasional caffeine.\par \par Both parents are alive and well.\par \par She takes an occasional ibuprofen for pain and loratadine for skin allergies.\par \par She has no known drug allergies.\par \par She denies any shortness of breath but does get occasional palpitations.

## 2022-12-07 NOTE — DISCUSSION/SUMMARY
[EKG obtained to assist in diagnosis and management of assessed problem(s)] : EKG obtained to assist in diagnosis and management of assessed problem(s) [FreeTextEntry1] : The patient was examined.  Her blood pressure was 112/77, pulse was 61.  Her lungs were clear to auscultation.  Cardiac exam was negative for murmurs rubs or gallops.  Her EKG showed sinus rhythm with normal QRS complexes.  The patient was advised to stay on her current medication.  No new medications were prescribed today.  She was advised to return in 3 months, or earlier if needed.   Total time spent on the day of the encounter was 32  minutes which includes  face-to-face and non face-to-face times personally spent by the physician preparing to see the patient, obtaining  separately obtained history, performing a medically appropriate exam and evaluation, counseling, educating, talking to the family or caregivers, ordering medicines, ordering tests or procedures, referring and communicating with other healthcare foods professionals, and documenting clinical information in the electronic health record.

## 2022-12-08 ENCOUNTER — APPOINTMENT (OUTPATIENT)
Dept: CARE COORDINATION | Facility: HOME HEALTH | Age: 32
End: 2022-12-08

## 2022-12-08 ENCOUNTER — NON-APPOINTMENT (OUTPATIENT)
Age: 32
End: 2022-12-08

## 2022-12-08 VITALS — HEART RATE: 73 BPM | SYSTOLIC BLOOD PRESSURE: 130 MMHG | DIASTOLIC BLOOD PRESSURE: 80 MMHG

## 2022-12-08 PROCEDURE — 96127 BRIEF EMOTIONAL/BEHAV ASSMT: CPT

## 2022-12-08 PROCEDURE — 99350 HOME/RES VST EST HIGH MDM 60: CPT

## 2022-12-08 RX ORDER — AMOXICILLIN AND CLAVULANATE POTASSIUM 875; 125 MG/1; MG/1
875-125 TABLET, COATED ORAL
Qty: 14 | Refills: 0 | Status: DISCONTINUED | COMMUNITY
Start: 2022-09-19 | End: 2022-12-08

## 2022-12-08 RX ORDER — NIFEDIPINE 60 MG/1
60 TABLET, FILM COATED, EXTENDED RELEASE ORAL
Qty: 30 | Refills: 0 | Status: DISCONTINUED | COMMUNITY
Start: 2022-11-28 | End: 2022-12-08

## 2022-12-08 RX ORDER — ONDANSETRON 4 MG/1
4 TABLET ORAL
Qty: 12 | Refills: 0 | Status: DISCONTINUED | COMMUNITY
Start: 2022-09-15 | End: 2022-12-08

## 2022-12-10 LAB — SURGICAL PATHOLOGY STUDY: SIGNIFICANT CHANGE UP

## 2022-12-13 ENCOUNTER — NON-APPOINTMENT (OUTPATIENT)
Age: 32
End: 2022-12-13

## 2022-12-14 ENCOUNTER — APPOINTMENT (OUTPATIENT)
Dept: OBGYN | Facility: CLINIC | Age: 32
End: 2022-12-14

## 2022-12-14 DIAGNOSIS — R30.0 DYSURIA: ICD-10-CM

## 2022-12-14 PROCEDURE — 99211 OFF/OP EST MAY X REQ PHY/QHP: CPT | Mod: TH

## 2022-12-15 ENCOUNTER — APPOINTMENT (OUTPATIENT)
Dept: OBGYN | Facility: CLINIC | Age: 32
End: 2022-12-15

## 2022-12-19 ENCOUNTER — NON-APPOINTMENT (OUTPATIENT)
Age: 32
End: 2022-12-19

## 2022-12-20 ENCOUNTER — NON-APPOINTMENT (OUTPATIENT)
Age: 32
End: 2022-12-20

## 2022-12-22 ENCOUNTER — APPOINTMENT (OUTPATIENT)
Dept: OBGYN | Facility: CLINIC | Age: 32
End: 2022-12-22

## 2022-12-27 ENCOUNTER — NON-APPOINTMENT (OUTPATIENT)
Age: 32
End: 2022-12-27

## 2022-12-29 ENCOUNTER — APPOINTMENT (OUTPATIENT)
Dept: OBGYN | Facility: CLINIC | Age: 32
End: 2022-12-29

## 2023-01-02 LAB — BACTERIA UR CULT: NORMAL

## 2023-01-04 ENCOUNTER — NON-APPOINTMENT (OUTPATIENT)
Age: 33
End: 2023-01-04

## 2023-01-05 ENCOUNTER — APPOINTMENT (OUTPATIENT)
Dept: OBGYN | Facility: CLINIC | Age: 33
End: 2023-01-05

## 2023-01-05 ENCOUNTER — NON-APPOINTMENT (OUTPATIENT)
Age: 33
End: 2023-01-05

## 2023-01-10 ENCOUNTER — APPOINTMENT (OUTPATIENT)
Dept: OBGYN | Facility: CLINIC | Age: 33
End: 2023-01-10
Payer: MEDICAID

## 2023-01-10 VITALS
BODY MASS INDEX: 29.23 KG/M2 | HEART RATE: 67 BPM | WEIGHT: 165 LBS | SYSTOLIC BLOOD PRESSURE: 135 MMHG | DIASTOLIC BLOOD PRESSURE: 85 MMHG | HEIGHT: 63 IN

## 2023-01-10 DIAGNOSIS — Z34.03 ENCOUNTER FOR SUPERVISION OF NORMAL FIRST PREGNANCY, THIRD TRIMESTER: ICD-10-CM

## 2023-01-10 DIAGNOSIS — O13.9 GESTATIONAL [PREGNANCY-INDUCED] HYPERTENSION W/OUT SIGNIFICANT PROTEINURIA, UNSPECIFIED TRIMESTER: ICD-10-CM

## 2023-01-10 DIAGNOSIS — Z34.02 ENCOUNTER FOR SUPERVISION OF NORMAL FIRST PREGNANCY, SECOND TRIMESTER: ICD-10-CM

## 2023-01-10 PROCEDURE — 99212 OFFICE O/P EST SF 10 MIN: CPT | Mod: TH

## 2023-01-10 RX ORDER — NIFEDIPINE 30 MG/1
30 TABLET, FILM COATED, EXTENDED RELEASE ORAL
Refills: 0 | Status: DISCONTINUED | COMMUNITY
Start: 2022-11-29 | End: 2023-01-10

## 2023-01-10 RX ORDER — NIFEDIPINE 60 MG/1
60 TABLET, EXTENDED RELEASE ORAL EVERY MORNING
Refills: 0 | Status: DISCONTINUED | COMMUNITY
Start: 2022-11-29 | End: 2023-01-10

## 2023-01-10 RX ORDER — BLOOD PRESSURE TEST KIT-MEDIUM
KIT MISCELLANEOUS
Qty: 1 | Refills: 0 | Status: DISCONTINUED | COMMUNITY
Start: 2022-11-28 | End: 2023-01-10

## 2023-01-10 NOTE — HISTORY OF PRESENT ILLNESS
[Delivery Date: ___] : on [unfilled] [Girl] : baby is a girl [Infant's Name ___] : [unfilled] [___ Lbs] : [unfilled] lbs [___ Oz] : [unfilled] oz [Living at Home] : is currently living at home [Postpartum Follow Up] : postpartum follow up [Complications:___] : complications include: [unfilled] [] : delivered by vaginal delivery [Breastfeeding] : currently nursing [Resumed Menses] : has not resumed her menses [Resumed [place]] : has not resumed intercourse [Awake] : awake [Alert] : alert [Acute Distress] : no acute distress [Mass] : no breast mass [Nipple Discharge] : no nipple discharge [Axillary LAD] : no axillary lymphadenopathy [Soft] : soft [Tender] : non tender [Distended] : not distended [Oriented x3] : oriented to person, place, and time [Depressed Mood] : not depressed [Flat Affect] : affect not flat [Normal] : external genitalia [Motion Tenderness] : there was no cervical motion tenderness [Tenderness] : nontender [Adnexa Tenderness] : were not tender [Doing Well] : is doing well [No Sign of Infection] : is showing no signs of infection [Excellent Pain Control] : has excellent pain control [None] : None [FreeTextEntry8] : This 32 P 0101 presents for PPV after vaginal delivery complicated by severe PEC and IUGR, at 34 weeks; stopped Nifedipine on 1/1/23 after BP readings reported to be on low side, has sought care with cardiologist, Dr. Hopkins; voiding and stooling without issue, will use condoms for now. [de-identified] : Stable PPV; hx of PEC [de-identified] : Reinforced f/u with Dr. Hopkins due to hx; Kegel exercise sheet in hand; RTO prn or for annual in 4 months

## 2023-03-09 ENCOUNTER — NON-APPOINTMENT (OUTPATIENT)
Age: 33
End: 2023-03-09

## 2023-03-09 ENCOUNTER — APPOINTMENT (OUTPATIENT)
Dept: CARDIOLOGY | Facility: CLINIC | Age: 33
End: 2023-03-09
Payer: MEDICAID

## 2023-03-09 VITALS
DIASTOLIC BLOOD PRESSURE: 82 MMHG | OXYGEN SATURATION: 98 % | BODY MASS INDEX: 30.83 KG/M2 | HEIGHT: 63 IN | WEIGHT: 174 LBS | SYSTOLIC BLOOD PRESSURE: 117 MMHG | HEART RATE: 71 BPM

## 2023-03-09 DIAGNOSIS — R07.89 OTHER CHEST PAIN: ICD-10-CM

## 2023-03-09 PROCEDURE — 99214 OFFICE O/P EST MOD 30 MIN: CPT | Mod: 25

## 2023-03-09 PROCEDURE — 93000 ELECTROCARDIOGRAM COMPLETE: CPT

## 2023-03-09 NOTE — REASON FOR VISIT
[FreeTextEntry1] : This is the 3 rd   office visit for this 32 -year-old female who experienced chest pains a few weeks ago.  She was under stress at the time.  She was planning her own wedding and found out that a cousin who is of similar age had  of a myocardial infarction.  She went to the [facility] emergency room where they did serial enzymes and EKGs and sent her home after approximately 6 hours.  She now comes for follow-up.\par 2022: The patient was pregnant and developed gestational hypertension.  She was induced and delivered a baby on 2022: She is on nifedipine 60 mg in the morning and 30 mg in the afternoon with a parameter to hold the blood pressure medicine if the systolic is below 112.  She denies any chest pains, shortness of breath, or palpitations.  She is breast-feeding.\par 2023: The patient's blood pressure was low and she stopped her blood pressure medicine on 2023.  She feels well.  She denies any chest pains, palpitations, or shortness of breath.\par The patient had COVID-19 in 2021.  At the time she lost her sense of taste and smell.  Of note recently she has been having a sensation of smelling smoke when there is no smoke.\par \par The patient has had known fibrocystic disease of the breast and had breast biopsy.\par \par The patient never smoked.  She only has an occasional alcohol averaging 2 or 3 a month.  She has no recreational drugs.  She only has occasional caffeine.\par \par Both parents are alive and well.\par \par She takes an occasional ibuprofen for pain and loratadine for skin allergies.\par \par She has no known drug allergies.\par \par She denies any shortness of breath but does get occasional palpitations.

## 2023-03-09 NOTE — DISCUSSION/SUMMARY
[EKG obtained to assist in diagnosis and management of assessed problem(s)] : EKG obtained to assist in diagnosis and management of assessed problem(s) [FreeTextEntry1] : The patient was examined.  Her blood pressure was 117/82 pulse was 71.  Her lungs were clear to auscultation.  Cardiac exam was negative for murmurs rubs or gallops.  Her EKG showed sinus rhythm with low voltage in the precordial leads.  The patient was advised to stay on her current medication.  No new medications were prescribed today.  She was advised to return in 3 months, or earlier if needed.   Total time spent on the day of the encounter was 31  minutes which includes  face-to-face and non face-to-face times personally spent by the physician preparing to see the patient, obtaining  separately obtained history, performing a medically appropriate exam and evaluation, counseling, educating, talking to the family or caregivers, ordering medicines, ordering tests or procedures, referring and communicating with other healthcare foods professionals, and documenting clinical information in the electronic health record.

## 2023-04-11 NOTE — OB RN PATIENT PROFILE - NS PRO ABUSE SCREEN SUSPICION NEGLECT YN
How To Prepare Before Your  Radical Prostatectomy (DVP)  Scheduling  Our  will call you to schedule surgery within 3-5 business days. If you have not heard from their office, please call [phone].    Please schedule a preoperative exam with your primary care provider This should be scheduled no later than 2 weeks prior to surgery and no more than 30 days prior to surgery. Your primary care provider may require you to have blood work and/or imaging studies done prior to surgery. Please follow their recommendations as your surgery is dependent on obtaining pre-operative clearance.     You will need to schedule pelvic floor physical therapy appointment to learn how to exerciser pelvic floor.  Please call [phone] to set this up.    Medications  Stop all aspirin and aspirin products for 10 days prior to surgery   Anabella   Ecotrin   Excedrin     Stop Supplements 10 days prior to surgery   Fish Oil (Omega-3)    Multi-Vitamins (Centrum)             Vitamin E, Gissell's Wort, Ginkgo, Ginseng, Arnica, Clove oil, Evening              Primrose, feverfew, saw palmetto     Stop Anticoagulation medicine (Blood Thinners) prior to surgery according to the chart below:    2 days (48 hours) 3 days (72 hours) 5 days 7 days   apixaban (Eliquis) dabigatran (Pradaxa) Warfarin (Coumadin) clopidogrel (Plavix)   rivaroxaban (Xarelto) cilostazol (Pletal) ticagrelor (Brilinta)    bivalirudin (Angiomax)  Argatroban (Acova)  fondaparinux (Arixta)          We will tell you when to resume your anticoagulation medications after surgery.    Diet  The day prior to surgery, follow a full liquid diet.  FULL Liquid diet is made up of fluids and foods that are normally liquid and food that turn into liquid when they are at room temperature, like ice cream, strained creamy soups or broth, tea, juice, jello, milkshakes, pudding, popsicle, water, fruit juices, custard, fruit ice and Boost, Ensure and other Liquid supplements.     Do not  eat or drink after midnight the night before the surgery.        Radical Prostatectomy (DVP) Discharge Instructions    Activity/Restrictions  Do not lift anything greater than 20 lbs for 4 weeks. After 4 weeks, you may resume normal activity including sexual activity.   No driving while catheter is in place. Once catheter is removed, you may resume driving as long as you are not taking narcotic pain medications.   We recommend walking around the house, do not lie in bed all day. We recommend getting up and about every 2-3 hours while you are awake.   You may shower 48 hours after surgery. Take your catheter bag into the shower with you. The securement device on your thigh will keep the catheter secure in your body while you shower.   Please wash around the catheter in the morning and evening with soap and water to keep it clean.  You should resume Kegel exercises once catheter is removed       Medications  You will be prescribed medication for pain (Norco), take them as prescribed. The usual dose is 1 - 2 tabs every 4 - 6 hours as needed for pain. However, we prefer you use Tylenol or no medications if pain is minimal.   Stool softener (Colace/Docusate Sodium) is used twice daily. Your pain medicine is constipating, so you need to watch out. Despite the stool softeners if you do not have a bowel movement use prune juice or Milk of Magnesia (2 - 3 tablespoons) daily.   An antibiotic, nitrofurantoin to be started on the day of your appointment for kruger removal.  Take your usual medicine when you get home. Aspirin can be restarted as soon as the blood in your urine clears.    Diet  The day of your surgery, you will start with clear liquids  The day after your surgery, you will advance your diet to full liquids. You should continue full liquids until you have a bowel movement.   After you have your first formed bowel movement, you may advance your diet to a regular diet. Keep in mind, bland is better.       Follow-up  appointments  I will see you in the office 8 - 10 days after surgery to remove your catheter.  Wear Brief underwear to this appointment. We will be providing you a pad.   If you have problems or concerns, please call my office at [phone].    When should you call?  Wound infections are rare, however drainage, redness around the incision, and fever are signs of an infection.   A swelling in one calf more than the other may be a sign of a blood clot (DVT).                What To Expect During Recovery After Radical Prostatectomy (DVP)    General Recovery  After a DVP most patients are feeling stronger 1 - 2 weeks after their catheter removal. We expect a 15 pound lifting restriction up to 4 weeks after surgery. The main concern is the incision where the specimen was removed. Repetitive or excess strain on that region can result in a hernia. You can walk for exercise and do upper body exercise but no sit ups, weight lifting or squatting.     Upper body and abdominal soreness is to be expected after surgery due to positioning during surgery. Continue to walk every 2-3 hours and take Tylenol as scheduled to help with the pain.     If you have a desk job you can return to work as early as 3 - 4 weeks after surgery. I do warn patients that they need to focus on Kegel exercise rather than rushing back to work. For men whose work involves lifting, bending, squatting or pushing likely will require 6 weeks of rest before they return to full work. Minimally invasive surgery makes you feel better sooner but your body still needs to heal on the inside.    Control of Bladder (continence)  After the catheter has been removed, we expect you to have incontinence (leakage) that will require the use of 2 - 4 pads per day. Depends pads for men, which are available at any pharmacy, are recommended. The return of bladder control (continence) will take anywhere from 2 - 6 weeks. In the first 2 weeks after the catheter removal, you will  likely use 3 - 4 pads per day. In the next 2 weeks, we expect you to use 2 - 3 pads per day. By 4 - 6 weeks, 90% of the men are dry and are using 1 pad a day, mainly for protection. There is a small group of men who may take several additional weeks and occasionally several months to fully recover their continence.    You can speed up the return of your continence by doing Kegel exercise. I tell my patients that every hour they should do 10 quick contractions (tighten and release) and also 10 contractions where they hold the sphincter for 3 - 4 seconds before relaxing. This needs to be done religiously at least 8 - 10 times a day. Initially, you can expect some leakage when performing the Kegel exercises.    Anticipatory Kegel exercise is the active voluntary contraction of the sphincter when a stress maneuver is expected. So if you expect to get up from a chair, contract the sphincter and then get up, then slowly relax the sphincter.    Kegel exercise before surgery    Practice 10 quick Kegel and 10 sustained Kegel contraction where you hold it for 3-4 seconds then relax.    Before the surgery, please do this at least 5 to 6 times a day to strengthen the external sphincter.    The abdomen should not be zoran when doing the Kegel, just the pelvic floor muscle.  You can try this when you are urinating by stopping the stream in the middle. The pelvic floor muscle that stops the stream and controls the anus is one and that prevents passing gas. .   So, you can also try exercising the muscle by puckering your bottom muscle(anal muscle) as if you are trying to stop gas from passing. (that is another way).    After the surgery you will have to do a lot more religiously    Kegel Exercise after Surgery:  Every hour while you are awake  10 quick Kegels  10 Kegels where you hold for 3-4 seconds then relax  This will help you to not leak urine  You may leak when you do the exercises.    If you have any questions, do not  hesitate to call [phone].      Things to buy:    A. Urinary pads and disposable underwear. Go to urination aisle at [facility] or [facility] for best selection. Do not need to buy name brand Depends or Poise, necesssarily.  Can buy generic. But make sure you are not buying female mensuration pads as they will not absorb urine as well.     Most people like the pads during the day and the disposable underwear for night time.  Easier to change pads during day without having to take off pants and such as you do to change the disposable underwear.    Buy pads in a few sizes. Do not open until you need to use them in case you decide to return them. Save receipts.      B. Foods to consider purchasing for the progression diet after surgery.     First 2 days after surgery- you will be on clear liquids. Clear liquids are any fluids you can see through if you hold them up to the light.    Examples are jello, juice (apple, grape, cranberry), popsicles, plain broth (chicken, beef, or vegetable), coffee and tea without milk or cream, white soda.    The third and fourth days after surgery will be progressed to full liquid diet.  Full liquids are any liquid, does not have to be see through and things that melt to liquid at room temperature.     Examples are ice cream, milk shakes, pudding, yogurt without chunks, tomato soup, strained soups of any kind.    The fifth day after surgery will be progressed to soft diet.     Examples are applesauce, cottage cheese, scrambled eggs, pancakes, toast, most soups.    The sixth day after surgery you will be allowed to be on your regular diet again.     There are 4 steps in the continence ladder that one has to climb in order to reach full continence.   The first step is dryness achieved at night. If you wake up in the morning with a dry pad you have climbed the first rung of the ladder. (In the first week or two)  The second step is being dry at the end of a sedentary activity such as sitting  down for 2 hours, watching TV or reading a book. (2nd - 3rd week)  The third step is being dry when walking leisurely. (3rd - 4th week)  The fourth step is the hardest to achieve and is the dryness associated with cough, strain or any stressful activity.    Remember to do Kegel exercise routine as mentioned above. This will help you become dry faster. You should be able to do the exercise laying down, sitting or standing. In some cases, patients become dry within a few days after catheter removal and will not need to follow the above steps.      Return of Erections (potency)  After DVP the nerves and the blood vessels adjacent to the prostate are in a state of shock and can take days to weeks to return to normal function. Initially after surgery erections are weak to non existent. While you are recovering, we enhance the return of the erections with medications such as Viagra, Levitra or Cialis. These medications taken in a smaller dose once or twice a week, in the evening, will help restore blood flow back to the penis. Since nocturnal (night time) erections do occur in all men, we are increasing the blood flow with the help of these medications. Initially while taking the medication for what we call penile rehabilitation, you may have no result. Don't worry. Recovery may take anywhere from 2 weeks to 12 months and occasionally up to 18 months. If you want to attempt sexual activity you can take the full dose 1 - 2 hours prior. If you fail to achieve an erection, do not worry, continue to retry. It may take a few tries before success.  Most men who are less than 60 year of age and who were fully potent before surgery can expect return of spontaneous erections (assuming bilateral nerve preservation was possible) by about 2 - 4 months.     no

## 2023-04-19 ENCOUNTER — APPOINTMENT (OUTPATIENT)
Dept: GASTROENTEROLOGY | Facility: CLINIC | Age: 33
End: 2023-04-19
Payer: MEDICAID

## 2023-04-19 ENCOUNTER — APPOINTMENT (OUTPATIENT)
Dept: OBGYN | Facility: CLINIC | Age: 33
End: 2023-04-19
Payer: MEDICAID

## 2023-04-19 ENCOUNTER — ASOB RESULT (OUTPATIENT)
Age: 33
End: 2023-04-19

## 2023-04-19 VITALS
RESPIRATION RATE: 18 BRPM | SYSTOLIC BLOOD PRESSURE: 122 MMHG | DIASTOLIC BLOOD PRESSURE: 64 MMHG | OXYGEN SATURATION: 98 % | BODY MASS INDEX: 30.65 KG/M2 | HEIGHT: 63 IN | WEIGHT: 173 LBS | HEART RATE: 75 BPM

## 2023-04-19 VITALS
BODY MASS INDEX: 30.65 KG/M2 | HEIGHT: 63 IN | DIASTOLIC BLOOD PRESSURE: 68 MMHG | HEART RATE: 73 BPM | SYSTOLIC BLOOD PRESSURE: 123 MMHG

## 2023-04-19 DIAGNOSIS — K59.00 CONSTIPATION, UNSPECIFIED: ICD-10-CM

## 2023-04-19 DIAGNOSIS — R10.2 PELVIC AND PERINEAL PAIN: ICD-10-CM

## 2023-04-19 DIAGNOSIS — K62.5 HEMORRHAGE OF ANUS AND RECTUM: ICD-10-CM

## 2023-04-19 DIAGNOSIS — R14.0 ABDOMINAL DISTENSION (GASEOUS): ICD-10-CM

## 2023-04-19 DIAGNOSIS — N89.8 OTHER SPECIFIED NONINFLAMMATORY DISORDERS OF VAGINA: ICD-10-CM

## 2023-04-19 PROCEDURE — 76830 TRANSVAGINAL US NON-OB: CPT

## 2023-04-19 PROCEDURE — 99204 OFFICE O/P NEW MOD 45 MIN: CPT | Mod: 25

## 2023-04-19 PROCEDURE — 99213 OFFICE O/P EST LOW 20 MIN: CPT

## 2023-04-19 RX ORDER — HYDROCORTISONE 2.5% 25 MG/G
2.5 CREAM TOPICAL DAILY
Qty: 1 | Refills: 1 | Status: ACTIVE | COMMUNITY
Start: 2023-04-19 | End: 1900-01-01

## 2023-04-19 RX ORDER — SODIUM PICOSULFATE, MAGNESIUM OXIDE, AND ANHYDROUS CITRIC ACID 10; 3.5; 12 MG/160ML; G/160ML; G/160ML
10-3.5-12 MG-GM LIQUID ORAL
Qty: 1 | Refills: 0 | Status: ACTIVE | COMMUNITY
Start: 2023-04-19 | End: 1900-01-01

## 2023-04-19 RX ORDER — POLYETHYLENE GLYCOL 3350 17 G/17G
17 POWDER, FOR SOLUTION ORAL TWICE DAILY
Qty: 60 | Refills: 3 | Status: ACTIVE | COMMUNITY
Start: 2023-04-19 | End: 1900-01-01

## 2023-04-19 NOTE — PHYSICAL EXAM
[Normal S1, S2] : normal S1 and S2 [No CVA Tenderness] : no CVA  tenderness [] : no rash [No Focal Deficits] : no focal deficits [Oriented To Time, Place, And Person] : oriented to person, place, and time [None] : no edema [Normal] : normal bowel sounds, non-tender, no masses, soft, no no hepato-splenomegaly [No External Hemorrhoid] : no external hemorrhoids [No Rectal Mass] : no rectal mass [de-identified] : brown stool , internal hemorrhoid

## 2023-04-19 NOTE — ASSESSMENT
[FreeTextEntry1] : 1. constipation and rectal bleeding, ddx hemorrhoid,polyp  fissure etc. recommend colonoscopy to evaluate\par \par Discussed risks including but not limited to bleeding,infection,drug reaction, perforation,missed lesion,benefits and alternatives of colonoscopy/egd with patient  including no\par treatment and patient consents to procedure.\par \par plan colonoscopy to be scheduled\par       miralax bid\par  proctozone as  needed\par \par 2. h/o abdominal bloating, and family h/o celiac disease\par \par plan labs for celiac disease\par          \par

## 2023-04-19 NOTE — REVIEW OF SYSTEMS
[As Noted in HPI] : as noted in HPI [Fever] : no fever [Chills] : no chills [Red Eyes] : eyes not red [Sore Throat] : no sore throat [Chest Pain] : no chest pain [Palpitations] : no palpitations [Rash] : no rash [Joint Stiffness] : no joint stiffness [Skin Wound] : no skin wound [Depression] : no depression [Muscle Weakness] : no muscle weakness [Easy Bruising] : no tendency for easy bruising

## 2023-04-19 NOTE — HISTORY OF PRESENT ILLNESS
[FreeTextEntry1] : 33 yo female s/p   4 months ago, h/o pre eclampsia, patient with h/o constipation, s/p brbpr 2 weeks ago, last episode this week. large formed bm. straining. last bm today , hard, no abdominal pain, no weight loss. no n/v. no gerd. \par patient also reports abominal bloating, strong family history of celiac disease.\par \par h/o colonoscopy  2 years ago  polyp noted\par no family h/o colon or gastric cancer

## 2023-04-20 ENCOUNTER — EMERGENCY (EMERGENCY)
Facility: HOSPITAL | Age: 33
LOS: 1 days | Discharge: HOME | End: 2023-04-20
Admitting: EMERGENCY MEDICINE
Payer: MEDICAID

## 2023-04-20 VITALS
HEART RATE: 71 BPM | OXYGEN SATURATION: 100 % | DIASTOLIC BLOOD PRESSURE: 76 MMHG | TEMPERATURE: 99 F | SYSTOLIC BLOOD PRESSURE: 132 MMHG | RESPIRATION RATE: 16 BRPM

## 2023-04-20 DIAGNOSIS — Z98.890 OTHER SPECIFIED POSTPROCEDURAL STATES: Chronic | ICD-10-CM

## 2023-04-20 PROCEDURE — 99284 EMERGENCY DEPT VISIT MOD MDM: CPT

## 2023-04-20 PROCEDURE — 71046 X-RAY EXAM CHEST 2 VIEWS: CPT | Mod: 26

## 2023-04-20 RX ORDER — FLUTICASONE PROPIONATE 50 MCG
1 SPRAY, SUSPENSION NASAL
Qty: 1 | Refills: 0
Start: 2023-04-20

## 2023-04-20 NOTE — ED PROVIDER NOTE - CLINICAL SUMMARY MEDICAL DECISION MAKING FREE TEXT BOX
33 yo F currently breastfeeding 4mo daughter, presenting with 7 days of worsening cold like symptoms including throat pain worse with swallowing, cough with green mixed with bloody sputum, nasal congestion, bilateral ear pain and pressure. She was prompted to come to ED this morning after waking up and finding her eyes red bilaterally and painful, L > R, with mild crusting.   Plan - CXR to evaluate for pneumonia, respiratory swab to evaluate for viral causative agent. Likely adenovirus, pending results of CXR will determine need for abx or supportive care

## 2023-04-20 NOTE — ED PROVIDER NOTE - NEURO NEGATIVE STATEMENT, MLM
+ hx of migraines, no loss of consciousness, no gait abnormality, no sensory deficits, and no weakness.

## 2023-04-20 NOTE — ED PROVIDER NOTE - OBJECTIVE STATEMENT
31 yo F currently breastfeeding 4mo daughter, presenting with 7 days of worsening cold like symptoms including throat pain worse with swallowing, cough with green mixed with bloody sputum, nasal congestion, bilateral ear pain and pressure. She was prompted to come to ED this morning after waking up and finding her eyes red bilaterally and painful, L > R, with mild crusting. She denies change in vision, tearing or discharge from the eyes. She reports she has not checked her temperature at home. She went to an urgent care 4-5 days ago and was tested for flu, covid and strep with negative results. She lives at home with her  and daughter, denies sick contacts or recent travel. She reports vomiting twice over the week and constipation. Appetite has been decreased. Denies CP, SOB, abdominal pain, diarrhea. She saw her OB yesterday and was given referral to see GI due to recent finding last week of blood in her stool last week.

## 2023-04-20 NOTE — ED ADULT TRIAGE NOTE - CHIEF COMPLAINT QUOTE
c/o throat pain x 7 days. Productive cough with green sputum x 5 days. C/o bilateral eye redness since this morning. Denies any fever/chills.

## 2023-04-20 NOTE — ED PROVIDER NOTE - NSFOLLOWUPINSTRUCTIONS_ED_ALL_ED_FT
Viral Syndrome    WHAT YOU NEED TO KNOW:    Viral syndrome is a term used for symptoms of an infection caused by a virus. Viruses are spread easily from person to person through the air and on shared items.    DISCHARGE INSTRUCTIONS:    Call your local emergency number (911 in the [place]) or have someone else call if:    You have a seizure.    You cannot be woken.    You have chest pain or trouble breathing.  Seek care immediately if:    You have a stiff neck, a bad headache, and sensitivity to light.    You feel weak, dizzy, or confused.    You stop urinating or urinate a lot less than usual.    You cough up blood or thick yellow or green mucus.    You have severe abdominal pain or your abdomen is larger than usual.  Call your doctor if:    Your symptoms do not get better with treatment or get worse after 3 days.    You have a rash or ear pain.    You have burning when you urinate.    You have questions or concerns about your condition or care.  Medicines: You may need any of the following:    Acetaminophen decreases pain and fever. It is available without a doctor's order. Ask how much to take and how often to take it. Follow directions. Read the labels of all other medicines you are using to see if they also contain acetaminophen, or ask your doctor or pharmacist. Acetaminophen can cause liver damage if not taken correctly. Do not use more than 4 grams (4,000 milligrams) total of acetaminophen in one day.    NSAIDs, such as ibuprofen, help decrease swelling, pain, and fever. NSAIDs can cause stomach bleeding or kidney problems in certain people. If you take blood thinner medicine, always ask your healthcare provider if NSAIDs are safe for you. Always read the medicine label and follow directions.    Cold medicine helps decrease swelling, control a cough, and relieve chest or nasal congestion.    Saline nasal spray helps decrease nasal congestion.    Take your medicine as directed. Contact your healthcare provider if you think your medicine is not helping or if you have side effects. Tell him of her if you are allergic to any medicine. Keep a list of the medicines, vitamins, and herbs you take. Include the amounts, and when and why you take them. Bring the list or the pill bottles to follow-up visits. Carry your medicine list with you in case of an emergency.  Manage your symptoms:    Drink liquids as directed to prevent dehydration. Ask how much liquid to drink each day and which liquids are best for you. Ask if you should drink an oral rehydration solution (ORS). An ORS has the right amounts of water, salts, and sugar you need to replace body fluids. This may help prevent dehydration caused by vomiting or diarrhea. Do not drink liquids with caffeine. Liquids with caffeine can make dehydration worse.    Get plenty of rest to help your body heal. Take naps throughout the day. Ask your healthcare provider when you can return to work and your normal activities.    Use a cool mist humidifier to help you breathe easier. Ask your healthcare provider how to use a cool mist humidifier.    Eat honey or use cough drops for a sore throat. Cough drops are available without a doctor's order. Follow directions for taking cough drops.    Do not smoke or be close to anyone who is smoking. Nicotine and other chemicals in cigarettes and cigars can cause lung damage. Smoking can also delay healing. Ask your healthcare provider for information if you currently smoke and need help to quit. E-cigarettes or smokeless tobacco still contain nicotine. Talk to your healthcare provider before you use these products.  Prevent the spread of germs:      Wash your hands often. Wash your hands several times each day. Wash after you use the bathroom, change a child's diaper, and before you prepare or eat food. Use soap and water every time. Rub your soapy hands together, lacing your fingers. Wash the front and back of your hands, and in between your fingers. Use the fingers of one hand to scrub under the fingernails of the other hand. Wash for at least 20 seconds. Rinse with warm, running water for several seconds. Then dry your hands with a clean towel or paper towel. Use hand  that contains alcohol if soap and water are not available. Do not touch your eyes, nose, or mouth without washing your hands first.  Handwashing      Cover a sneeze or cough. Use a tissue that covers your mouth and nose. Throw the tissue away in a trash can right away. Use the bend of your arm if a tissue is not available. Wash your hands well with soap and water or use a hand .    Stay away from others while you are sick. Avoid crowds as much as possible.    Ask about vaccines you may need. Talk to your healthcare provider about your vaccine history. He or she will tell you which vaccines you need, and when to get them.  Get the influenza (flu) vaccine as soon as recommended each year. The flu vaccine is available starting in September or October. Flu viruses change, so it is important to get a flu vaccine every year.    Get the pneumonia vaccine if recommended. This vaccine is usually recommended every 5 years. Your provider will tell you when to get this vaccine, if needed.  Follow up with your doctor as directed: Write down your questions so you remember to ask them during your visits.    Síndrome viral    LO QUE NECESITA SABER:    El síndrome viral es un término usado para los síntomas de ingrid infección causada por un virus. Los virus son propagados fácilmente de ingrid persona a otra a través del aire y mediante los objetos que se comparten.    INSTRUCCIONES SOBRE EL FITO HOSPITALARIA:    Llame al número local de emergencias (911 en los [place]), o pídale a alguien que llame si:    Usted sufre ingrid convulsión.    No es posible despertarlo.    Usted tiene dolor torácico y dificultad para respirar.  Busque atención médica de inmediato si:    Usted tiene rigidez en el vonnie, dolor de enedelia intenso y sensibilidad a la collins.    Usted se siente débil, mareado o confundido.    Usted christina de orinar u orina menos de lo habitual.    Usted tose jeferson o ingrid mucosidad espesa amarilla o leatha.    Usted tiene dolor abdominal severo o umanzor abdomen está más morgan de lo habitual.  Llame a umanzor médico si:    [place] síntomas no mejoran con el tratamiento o empeoran después de 3 días.    Usted tiene salpullido o dolor de oído.    Usted siente ardor al orinar.    Usted tiene preguntas o inquietudes acerca de umanzor condición o cuidado.  Medicamentos:Es posible que usted necesite alguno de los siguientes:    Acetaminofénalivia el dolor y baja la fiebre. Está disponible sin receta médica. Pregunte la cantidad y la frecuencia con que debe tomarlos. Siga las indicaciones. Renetta las etiquetas de todos los demás medicamentos que esté usando para saber si también contienen acetaminofén, o pregunte a umanzor médico o farmacéutico. El acetaminofén puede causar daño en el hígado cuando no se srinivasan de forma correcta. No use más de 4 gramos (4000 miligramos) en total de acetaminofeno en un día.    Los SHAKEEL,mayuri el ibuprofeno, ayudan a disminuir la inflamación, el dolor y la fiebre. Los SHAKEEL pueden causar sangrado estomacal o problemas renales en ciertas personas. Si usted srinivasan un medicamento anticoagulante, siempre pregúntele a umanzor médico si los SHAKEEL son seguros para usted. Siempre renetta la etiqueta de gustabo medicamento y siga las instrucciones.    El medicamento para el resfriadoayuda a disminuir la inflamación, a controlar la tos y a aliviar la congestión del pecho o nasal.    El rociador nasal de agua salinaayuda a disminuir la congestión nasal.    [place] medicamentos mayuri se le haya indicado.Consulte con umanzor médico si usted amanda que umanzor medicamento no le está ayudando o si presenta efectos secundarios. Infórmele si es alérgico a algún medicamento. Mantenga ingrid lista actualizada de los medicamentos, las vitaminas y los productos herbales que srinivasan. Incluya los siguientes datos de los medicamentos: cantidad, frecuencia y motivo de administración. Traiga con usted la lista o los envases de las píldoras a [place] citas de seguimiento. Lleve la lista de los medicamentos con usted en salinas de ingrid emergencia.  El manejo de [place] síntomas:    [place] líquidos mayuri se le indique para evitar ingrid deshidratación.Pregunte cuánto líquido debe fredi cada día y cuáles líquidos son los más adecuados para usted. Pregunte si usted debería fredi ingrid solución de rehidratación oral (SRO). Ingrid SRO tiene las cantidades exactas de agua, sal y azúcar que usted necesita para reemplazar los líquidos corporales. [place] podría ayudarlo a evitar la deshidratación a causa del vómito y de la diarrea. No tome líquidos con cafeína. Los líquidos con cafeína pueden empeorar la deshidratación.    Descanse lo suficiente para ayudar a que umanzor cuerpo sane.[place] siestas madonna el día. Pregunte a umanzor médico cuándo puede regresar a umanzor trabajo y a [place] actividades cotidianas.    Use un humidificador de chung fría para respirar más fácilmente.Pregunte a umanzor médico cómo usar un humidificador de vapor frío.    Coma miel o use pastillas para la tos para el dolor de garganta.Los caramelos para la tos están disponibles sin receta médica. Siga las indicaciones para fredi los caramelos para la tos.    No fume ni esté cerca a alguien que esté fumando.La nicotina y otras sustancias químicas que contienen los cigarrillos y cigarros pueden dañar los pulmones. El fumar también puede retrasar la sanación. Pida información a umanzor médico si usted actualmente fuma y necesita ayuda para dejar de fumar. Los cigarrillos electrónicos o el tabaco sin humo igualmente contienen nicotina. Consulte con umanzor médico antes de utilizar estos productos.  Prevenga la propagación de gérmenes:      Lávese las theodore frecuentemente.Lávese las theodore varias veces al día. Lávese después de usar el baño, después de cambiar pañales y antes de preparar la comida o comer. Use siempre agua y jabón. Frótese las theodore enjabonadas, entrelazando los dedos. Lávese el frente y el dorso de las theodore, y entre los dedos. Use los dedos de ingrid mano para restregar debajo de las uñas de la otra mano. Lávese madonna al menos 20 segundos. Enjuague con agua corriente caliente madonna varios segundos. Luego séquese las theodore con ingrid toalla limpia o ingrid toalla de papel. Puede usar un desinfectante para theodore que contenga alcohol, si no hay agua y jabón disponibles. No se toque los ojos, la nariz o la boca sin antes lavarse las theodore.  Lavado de theodore      Cúbrase al toser o estornudar.Use un pañuelo que cubra la boca y la nariz. Arroje el pañuelo a la basura de inmediato. Use el ángulo del brazo si no tiene un pañuelo disponible. Lávese las theodore con agua y jabón o use un desinfectante de theodore.    Manténgase alejado de los demás mientras esté enfermo.Evite las multitudes lo más que pueda.    Pregunte sobre las vacunas que pudiera necesitar.Hable con umanzor médico sobre umanzor historial de vacunación. Umanzor médico le indicará qué vacunas necesita y cuándo recibirlas.  Vacúnese contra la influenza (gripe) tan pronto mayuri se recomiende cada año.La vacuna antigripal se ofrece a partir de septiembre u octubre. Los virus de la gripe cambian, por lo que es importante vacunarse contra la gripe cada año.    Vacúnese contra la neumonía si se recomienda.Esta vacuna generalmente se recomienda cada 5 años. Umanzor médico le indicará cuándo recibir esta vacuna, de ser necesaria.  Acuda a la consulta de control con umanzor médico según las indicaciones:Anote [place] preguntas para que se acuerde de hacerlas madonna [place] visitas.

## 2023-04-27 LAB
ENDOMYSIUM IGA SER QL: NEGATIVE
ENDOMYSIUM IGA TITR SER: NORMAL
GLIADIN IGA SER QL: <5 UNITS
GLIADIN IGG SER QL: <5 UNITS
GLIADIN PEPTIDE IGA SER-ACNC: NEGATIVE
GLIADIN PEPTIDE IGG SER-ACNC: NEGATIVE
IGA SER QL IEP: 173 MG/DL
TTG IGA SER IA-ACNC: <1.2 U/ML
TTG IGA SER-ACNC: NEGATIVE
TTG IGG SER IA-ACNC: 2.9 U/ML
TTG IGG SER IA-ACNC: NEGATIVE

## 2023-05-03 LAB
ANNOTATION COMMENT IMP: NORMAL
HLA-DQ2: NEGATIVE
HLA-DQ8 QL: NEGATIVE
REF LAB TEST METHOD: NORMAL

## 2023-06-15 ENCOUNTER — NON-APPOINTMENT (OUTPATIENT)
Age: 33
End: 2023-06-15

## 2023-06-15 ENCOUNTER — APPOINTMENT (OUTPATIENT)
Dept: CARDIOLOGY | Facility: CLINIC | Age: 33
End: 2023-06-15
Payer: MEDICAID

## 2023-06-15 VITALS
WEIGHT: 179 LBS | SYSTOLIC BLOOD PRESSURE: 110 MMHG | HEART RATE: 60 BPM | DIASTOLIC BLOOD PRESSURE: 75 MMHG | OXYGEN SATURATION: 98 % | BODY MASS INDEX: 31.71 KG/M2

## 2023-06-15 DIAGNOSIS — R00.2 PALPITATIONS: ICD-10-CM

## 2023-06-15 DIAGNOSIS — F43.9 REACTION TO SEVERE STRESS, UNSPECIFIED: ICD-10-CM

## 2023-06-15 DIAGNOSIS — R00.1 BRADYCARDIA, UNSPECIFIED: ICD-10-CM

## 2023-06-15 PROCEDURE — 99214 OFFICE O/P EST MOD 30 MIN: CPT | Mod: 25

## 2023-06-15 PROCEDURE — 93000 ELECTROCARDIOGRAM COMPLETE: CPT

## 2023-06-15 NOTE — DISCUSSION/SUMMARY
[EKG obtained to assist in diagnosis and management of assessed problem(s)] : EKG obtained to assist in diagnosis and management of assessed problem(s) [FreeTextEntry1] : The patient was examined.  Her blood pressure was 110/75 and her pulse was 60.  Her lungs were clear to auscultation.  Cardiac exam was negative for murmurs rubs or gallops.  Her EKG showed sinus bradycardia with normal QRS complexes.  The patient was advised to stay on her current medication.  No new medications were prescribed today.  She was advised to return as needed.  total time spent on the day of the encounter was 31  minutes which includes  face-to-face and non face-to-face times personally spent by the physician preparing to see the patient, obtaining  separately obtained history, performing a medically appropriate exam and evaluation, counseling, educating, talking to the family or caregivers, ordering medicines, ordering tests or procedures, referring and communicating with other healthcare foods professionals, and documenting clinical information in the electronic health record.

## 2023-06-15 NOTE — REASON FOR VISIT
[FreeTextEntry1] : This is the 3 rd   office visit for this 32 -year-old female who experienced chest pains a few weeks ago.  She was under stress at the time.  She was planning her own wedding and found out that a cousin who is of similar age had  of a myocardial infarction.  She went to the [facility] emergency room where they did serial enzymes and EKGs and sent her home after approximately 6 hours.  She now comes for follow-up.\par 2022: The patient was pregnant and developed gestational hypertension.  She was induced and delivered a baby on 2022: She is on nifedipine 60 mg in the morning and 30 mg in the afternoon with a parameter to hold the blood pressure medicine if the systolic is below 112.  She denies any chest pains, shortness of breath, or palpitations.  She is breast-feeding.\par 2023: The patient's blood pressure was low and she stopped her blood pressure medicine on 2023.  She feels well.  She denies any chest pains, palpitations, or shortness of breath.\par Ashley 15, 2023: The patient feels well.  She denies any chest pains, shortness of breath, or palpitations.  She is not taking any medication.\par The patient had COVID-19 in 2021.  At the time she lost her sense of taste and smell.  Of note recently she has been having a sensation of smelling smoke when there is no smoke.\par \par The patient has had known fibrocystic disease of the breast and had breast biopsy.\par \par The patient never smoked.  She only has an occasional alcohol averaging 2 or 3 a month.  She has no recreational drugs.  She only has occasional caffeine.\par \par Both parents are alive and well.\par \par She takes an occasional ibuprofen for pain and loratadine for skin allergies.\par \par She has no known drug allergies.\par \par She denies any shortness of breath but does get occasional palpitations.

## 2023-06-22 ENCOUNTER — APPOINTMENT (OUTPATIENT)
Dept: OBGYN | Facility: CLINIC | Age: 33
End: 2023-06-22
Payer: MEDICAID

## 2023-06-22 VITALS
HEIGHT: 63 IN | SYSTOLIC BLOOD PRESSURE: 119 MMHG | WEIGHT: 177 LBS | HEART RATE: 60 BPM | DIASTOLIC BLOOD PRESSURE: 76 MMHG | BODY MASS INDEX: 31.36 KG/M2

## 2023-06-22 DIAGNOSIS — B96.89 ACUTE VAGINITIS: ICD-10-CM

## 2023-06-22 DIAGNOSIS — N76.0 ACUTE VAGINITIS: ICD-10-CM

## 2023-06-22 PROCEDURE — 99395 PREV VISIT EST AGE 18-39: CPT

## 2023-06-22 RX ORDER — METRONIDAZOLE 7.5 MG/G
0.75 GEL VAGINAL
Qty: 1 | Refills: 0 | Status: ACTIVE | COMMUNITY
Start: 2023-06-22 | End: 1900-01-01

## 2023-06-29 LAB
BACTERIA UR CULT: ABNORMAL
C TRACH RRNA SPEC QL NAA+PROBE: NOT DETECTED
CANDIDA VAG CYTO: NOT DETECTED
CYTOLOGY CVX/VAG DOC THIN PREP: ABNORMAL
G VAGINALIS+PREV SP MTYP VAG QL MICRO: DETECTED
HPV HIGH+LOW RISK DNA PNL CVX: NOT DETECTED
N GONORRHOEA RRNA SPEC QL NAA+PROBE: NOT DETECTED
SOURCE AMPLIFICATION: NORMAL
T VAGINALIS VAG QL WET PREP: NOT DETECTED

## 2023-06-29 NOTE — HISTORY OF PRESENT ILLNESS
[FreeTextEntry1] : 31yo  LMP 3wks ago, presents with c/o  suprapubic pain for 3wks.  She states pain was cramping and dull.  No aggravating or alleviating sx.  Pt reports pain ceased and has none today.  She reports mild vaginal odor.\par She denies vaginal d/c,  no urinary sx.  No other c/o.

## 2023-07-07 ENCOUNTER — APPOINTMENT (OUTPATIENT)
Dept: GASTROENTEROLOGY | Facility: CLINIC | Age: 33
End: 2023-07-07

## 2024-06-25 ENCOUNTER — APPOINTMENT (OUTPATIENT)
Dept: OBGYN | Facility: CLINIC | Age: 34
End: 2024-06-25
Payer: MEDICAID

## 2024-06-25 VITALS
HEIGHT: 63 IN | DIASTOLIC BLOOD PRESSURE: 77 MMHG | HEART RATE: 65 BPM | SYSTOLIC BLOOD PRESSURE: 117 MMHG | WEIGHT: 174 LBS | BODY MASS INDEX: 30.83 KG/M2

## 2024-06-25 DIAGNOSIS — Z01.419 ENCOUNTER FOR GYNECOLOGICAL EXAMINATION (GENERAL) (ROUTINE) W/OUT ABNORMAL FINDINGS: ICD-10-CM

## 2024-06-25 DIAGNOSIS — R19.04 LEFT LOWER QUADRANT ABDOMINAL SWELLING, MASS AND LUMP: ICD-10-CM

## 2024-06-25 DIAGNOSIS — N89.8 OTHER SPECIFIED NONINFLAMMATORY DISORDERS OF VAGINA: ICD-10-CM

## 2024-06-25 DIAGNOSIS — N64.4 MASTODYNIA: ICD-10-CM

## 2024-06-25 PROCEDURE — 99395 PREV VISIT EST AGE 18-39: CPT | Mod: 25

## 2024-06-25 PROCEDURE — 96127 BRIEF EMOTIONAL/BEHAV ASSMT: CPT

## 2024-06-25 PROCEDURE — 99459 PELVIC EXAMINATION: CPT

## 2024-06-30 LAB
BV BACTERIA RRNA VAG QL NAA+PROBE: NOT DETECTED
C GLABRATA RNA VAG QL NAA+PROBE: NOT DETECTED
C TRACH RRNA SPEC QL NAA+PROBE: NOT DETECTED
CANDIDA RRNA VAG QL PROBE: NOT DETECTED
N GONORRHOEA RRNA SPEC QL NAA+PROBE: NOT DETECTED
T VAGINALIS RRNA SPEC QL NAA+PROBE: NOT DETECTED

## 2024-09-27 ENCOUNTER — RESULT REVIEW (OUTPATIENT)
Age: 34
End: 2024-09-27

## 2024-09-27 ENCOUNTER — APPOINTMENT (OUTPATIENT)
Dept: MAMMOGRAPHY | Facility: CLINIC | Age: 34
End: 2024-09-27
Payer: MEDICAID

## 2024-09-27 ENCOUNTER — APPOINTMENT (OUTPATIENT)
Dept: ULTRASOUND IMAGING | Facility: CLINIC | Age: 34
End: 2024-09-27
Payer: MEDICAID

## 2024-09-27 ENCOUNTER — OUTPATIENT (OUTPATIENT)
Dept: OUTPATIENT SERVICES | Facility: HOSPITAL | Age: 34
LOS: 1 days | End: 2024-09-27
Payer: MEDICAID

## 2024-09-27 DIAGNOSIS — Z00.00 ENCOUNTER FOR GENERAL ADULT MEDICAL EXAMINATION WITHOUT ABNORMAL FINDINGS: ICD-10-CM

## 2024-09-27 DIAGNOSIS — Z98.890 OTHER SPECIFIED POSTPROCEDURAL STATES: Chronic | ICD-10-CM

## 2024-09-27 PROCEDURE — 77067 SCR MAMMO BI INCL CAD: CPT | Mod: 26

## 2024-09-27 PROCEDURE — 77063 BREAST TOMOSYNTHESIS BI: CPT | Mod: 26

## 2024-09-27 PROCEDURE — 76641 ULTRASOUND BREAST COMPLETE: CPT | Mod: 26,50

## 2024-09-27 PROCEDURE — 76830 TRANSVAGINAL US NON-OB: CPT | Mod: 26

## 2024-09-27 PROCEDURE — 77063 BREAST TOMOSYNTHESIS BI: CPT

## 2024-09-27 PROCEDURE — 77067 SCR MAMMO BI INCL CAD: CPT

## 2024-09-27 PROCEDURE — 76830 TRANSVAGINAL US NON-OB: CPT

## 2024-09-27 PROCEDURE — 76641 ULTRASOUND BREAST COMPLETE: CPT

## 2024-10-04 ENCOUNTER — RESULT REVIEW (OUTPATIENT)
Age: 34
End: 2024-10-04

## 2024-10-04 ENCOUNTER — OUTPATIENT (OUTPATIENT)
Dept: OUTPATIENT SERVICES | Facility: HOSPITAL | Age: 34
LOS: 1 days | End: 2024-10-04
Payer: MEDICAID

## 2024-10-04 ENCOUNTER — APPOINTMENT (OUTPATIENT)
Dept: ULTRASOUND IMAGING | Facility: CLINIC | Age: 34
End: 2024-10-04
Payer: MEDICAID

## 2024-10-04 DIAGNOSIS — Z98.890 OTHER SPECIFIED POSTPROCEDURAL STATES: Chronic | ICD-10-CM

## 2024-10-04 DIAGNOSIS — Z00.8 ENCOUNTER FOR OTHER GENERAL EXAMINATION: ICD-10-CM

## 2024-10-04 PROCEDURE — 76642 ULTRASOUND BREAST LIMITED: CPT

## 2024-10-04 PROCEDURE — 76642 ULTRASOUND BREAST LIMITED: CPT | Mod: 26,50

## 2024-10-09 ENCOUNTER — APPOINTMENT (OUTPATIENT)
Dept: OBGYN | Facility: CLINIC | Age: 34
End: 2024-10-09

## 2024-10-09 VITALS
SYSTOLIC BLOOD PRESSURE: 104 MMHG | WEIGHT: 173 LBS | HEIGHT: 63 IN | DIASTOLIC BLOOD PRESSURE: 67 MMHG | BODY MASS INDEX: 30.65 KG/M2 | HEART RATE: 56 BPM

## 2024-10-09 DIAGNOSIS — N83.202 UNSPECIFIED OVARIAN CYST, LEFT SIDE: ICD-10-CM

## 2024-10-09 PROCEDURE — 99213 OFFICE O/P EST LOW 20 MIN: CPT

## 2024-11-20 NOTE — OB PROVIDER IHI INDUCTION/AUGMENTATION NOTE - LABOR: CERVICAL EFFACEMENT
Anesthesia Post-op Note    Patient: Janel Bell  Procedure(s) Performed: EXCISIONAL BILATERAL BIOPSY VULVAR (Abdomen)  Anesthesia type: General    Vitals Value Taken Time   Temp 36.2 °C (97.2 °F) 11/20/24 1233   Pulse 79 11/20/24 1233   Resp 14 11/20/24 1233   SpO2 100 % 11/20/24 1233   /54 11/20/24 1233         Patient Location: PACU Phase 1  Post-op Vital Signs:stable  Level of Consciousness: awake and alert  Respiratory Status: spontaneous ventilation  Cardiovascular stable  Hydration: euvolemic  Pain Management: adequately controlled  Handoff: Handoff to receiving clinician was performed and questions were answered  Vomiting: none  Nausea: None  Airway Patency:patent  Post-op Assessment: no complications and patient tolerated procedure well    There were no known notable events for this encounter.                      
0%

## 2025-03-20 ENCOUNTER — APPOINTMENT (OUTPATIENT)
Dept: OBGYN | Facility: CLINIC | Age: 35
End: 2025-03-20

## 2025-03-20 VITALS
HEART RATE: 70 BPM | WEIGHT: 168 LBS | DIASTOLIC BLOOD PRESSURE: 72 MMHG | BODY MASS INDEX: 29.77 KG/M2 | SYSTOLIC BLOOD PRESSURE: 107 MMHG | HEIGHT: 63 IN

## 2025-03-20 DIAGNOSIS — N83.202 UNSPECIFIED OVARIAN CYST, LEFT SIDE: ICD-10-CM

## 2025-03-20 DIAGNOSIS — R10.84 GENERALIZED ABDOMINAL PAIN: ICD-10-CM

## 2025-03-20 DIAGNOSIS — N83.201 UNSPECIFIED OVARIAN CYST, RIGHT SIDE: ICD-10-CM

## 2025-03-20 DIAGNOSIS — N60.02 SOLITARY CYST OF RIGHT BREAST: ICD-10-CM

## 2025-03-20 DIAGNOSIS — N60.01 SOLITARY CYST OF RIGHT BREAST: ICD-10-CM

## 2025-03-20 PROCEDURE — 99459 PELVIC EXAMINATION: CPT

## 2025-03-20 PROCEDURE — 99213 OFFICE O/P EST LOW 20 MIN: CPT

## 2025-03-26 LAB
HCG UR QL: NEGATIVE
QUALITY CONTROL: YES

## 2025-04-07 ENCOUNTER — APPOINTMENT (OUTPATIENT)
Dept: OBGYN | Facility: CLINIC | Age: 35
End: 2025-04-07
Payer: MEDICAID

## 2025-04-07 VITALS
BODY MASS INDEX: 29.59 KG/M2 | WEIGHT: 167 LBS | SYSTOLIC BLOOD PRESSURE: 107 MMHG | HEART RATE: 69 BPM | HEIGHT: 63 IN | DIASTOLIC BLOOD PRESSURE: 67 MMHG

## 2025-04-07 DIAGNOSIS — N89.8 OTHER SPECIFIED NONINFLAMMATORY DISORDERS OF VAGINA: ICD-10-CM

## 2025-04-07 DIAGNOSIS — N92.6 IRREGULAR MENSTRUATION, UNSPECIFIED: ICD-10-CM

## 2025-04-07 LAB
HCG UR QL: NEGATIVE
QUALITY CONTROL: YES

## 2025-04-07 PROCEDURE — 99459 PELVIC EXAMINATION: CPT

## 2025-04-07 PROCEDURE — 99213 OFFICE O/P EST LOW 20 MIN: CPT

## 2025-04-09 ENCOUNTER — APPOINTMENT (OUTPATIENT)
Dept: ULTRASOUND IMAGING | Facility: CLINIC | Age: 35
End: 2025-04-09
Payer: MEDICAID

## 2025-04-09 ENCOUNTER — OUTPATIENT (OUTPATIENT)
Dept: OUTPATIENT SERVICES | Facility: HOSPITAL | Age: 35
LOS: 1 days | End: 2025-04-09
Payer: MEDICAID

## 2025-04-09 ENCOUNTER — RESULT REVIEW (OUTPATIENT)
Age: 35
End: 2025-04-09

## 2025-04-09 DIAGNOSIS — Z98.890 OTHER SPECIFIED POSTPROCEDURAL STATES: Chronic | ICD-10-CM

## 2025-04-09 DIAGNOSIS — N60.01 SOLITARY CYST OF RIGHT BREAST: ICD-10-CM

## 2025-04-09 PROCEDURE — 76642 ULTRASOUND BREAST LIMITED: CPT | Mod: 26,LT

## 2025-04-09 PROCEDURE — 76642 ULTRASOUND BREAST LIMITED: CPT

## 2025-04-10 ENCOUNTER — NON-APPOINTMENT (OUTPATIENT)
Age: 35
End: 2025-04-10

## 2025-04-10 LAB
BV BACTERIA RRNA VAG QL NAA+PROBE: NOT DETECTED
C GLABRATA RNA VAG QL NAA+PROBE: NOT DETECTED
CANDIDA RRNA VAG QL PROBE: NOT DETECTED
ESTRADIOL SERPL-MCNC: 123 PG/ML
FSH SERPL-MCNC: 2.4 IU/L
HCG SERPL-MCNC: <1 MIU/ML
PROLACTIN SERPL-MCNC: 4.9 NG/ML
T VAGINALIS RRNA SPEC QL NAA+PROBE: NOT DETECTED
TSH SERPL-ACNC: 1.12 UIU/ML

## 2025-04-17 ENCOUNTER — RESULT REVIEW (OUTPATIENT)
Age: 35
End: 2025-04-17

## 2025-04-17 ENCOUNTER — OUTPATIENT (OUTPATIENT)
Dept: OUTPATIENT SERVICES | Facility: HOSPITAL | Age: 35
LOS: 1 days | End: 2025-04-17
Payer: MEDICAID

## 2025-04-17 ENCOUNTER — APPOINTMENT (OUTPATIENT)
Dept: ULTRASOUND IMAGING | Facility: CLINIC | Age: 35
End: 2025-04-17
Payer: MEDICAID

## 2025-04-17 DIAGNOSIS — Z00.8 ENCOUNTER FOR OTHER GENERAL EXAMINATION: ICD-10-CM

## 2025-04-17 DIAGNOSIS — N60.01 SOLITARY CYST OF RIGHT BREAST: ICD-10-CM

## 2025-04-17 DIAGNOSIS — N64.4 MASTODYNIA: ICD-10-CM

## 2025-04-17 DIAGNOSIS — Z98.890 OTHER SPECIFIED POSTPROCEDURAL STATES: Chronic | ICD-10-CM

## 2025-04-17 PROCEDURE — 19083 BX BREAST 1ST LESION US IMAG: CPT

## 2025-04-17 PROCEDURE — 88305 TISSUE EXAM BY PATHOLOGIST: CPT | Mod: 26

## 2025-04-17 PROCEDURE — 88305 TISSUE EXAM BY PATHOLOGIST: CPT

## 2025-04-17 PROCEDURE — 19083 BX BREAST 1ST LESION US IMAG: CPT | Mod: LT

## 2025-04-21 LAB — SURGICAL PATHOLOGY STUDY: SIGNIFICANT CHANGE UP

## 2025-05-01 ENCOUNTER — ASOB RESULT (OUTPATIENT)
Age: 35
End: 2025-05-01

## 2025-05-01 ENCOUNTER — APPOINTMENT (OUTPATIENT)
Dept: OBGYN | Facility: CLINIC | Age: 35
End: 2025-05-01
Payer: MEDICAID

## 2025-05-01 PROCEDURE — 76830 TRANSVAGINAL US NON-OB: CPT

## 2025-06-24 ENCOUNTER — APPOINTMENT (OUTPATIENT)
Dept: OBGYN | Facility: CLINIC | Age: 35
End: 2025-06-24

## 2025-06-24 ENCOUNTER — NON-APPOINTMENT (OUTPATIENT)
Age: 35
End: 2025-06-24

## 2025-06-24 VITALS
WEIGHT: 169 LBS | BODY MASS INDEX: 29.95 KG/M2 | DIASTOLIC BLOOD PRESSURE: 71 MMHG | SYSTOLIC BLOOD PRESSURE: 104 MMHG | HEIGHT: 63 IN | HEART RATE: 63 BPM

## 2025-06-24 PROCEDURE — 99395 PREV VISIT EST AGE 18-39: CPT

## 2025-06-24 PROCEDURE — 99459 PELVIC EXAMINATION: CPT

## 2025-06-25 LAB — HPV HIGH+LOW RISK DNA PNL CVX: NOT DETECTED

## 2025-06-27 LAB — CYTOLOGY CVX/VAG DOC THIN PREP: NORMAL
